# Patient Record
Sex: MALE | Race: WHITE | NOT HISPANIC OR LATINO | URBAN - METROPOLITAN AREA
[De-identification: names, ages, dates, MRNs, and addresses within clinical notes are randomized per-mention and may not be internally consistent; named-entity substitution may affect disease eponyms.]

---

## 2017-05-17 ENCOUNTER — GENERIC CONVERSION - ENCOUNTER (OUTPATIENT)
Dept: OTHER | Facility: OTHER | Age: 67
End: 2017-05-17

## 2017-06-05 ENCOUNTER — ALLSCRIPTS OFFICE VISIT (OUTPATIENT)
Dept: OTHER | Facility: OTHER | Age: 67
End: 2017-06-05

## 2017-07-06 ENCOUNTER — GENERIC CONVERSION - ENCOUNTER (OUTPATIENT)
Dept: OTHER | Facility: OTHER | Age: 67
End: 2017-07-06

## 2017-12-04 ENCOUNTER — GENERIC CONVERSION - ENCOUNTER (OUTPATIENT)
Dept: OTHER | Facility: OTHER | Age: 67
End: 2017-12-04

## 2018-01-12 NOTE — MISCELLANEOUS
Chief Complaint  Chief Complaint Free Text Note Form: No PAUL was made for this patient because he was discharged to a skilled nursing facility  Active Problems    1  Benign colon polyp (211 3) (K63 5)   2  Benign essential hypertension (401 1) (I10)   3  Bilateral arm weakness (729 89) (R29 898)   4  Chronic left hip pain (697 37,822 38) (M25 552,G89 29)   5  De Quervain's tenosynovitis (727 04) (M65 4)   6  Diabetes (250 00) (E11 9)   7  Disc degeneration, lumbar (722 52) (M51 36)   8  Dyslipidemia (272 4) (E78 5)   9  Encounter for screening for malignant neoplasm of colon (V76 51) (Z12 11)   10  Herniated lumbar intervertebral disc (722 10) (M51 26)   11  Hypokalemia (276 8) (E87 6)   12  Left ventricular hypertrophy (429 3) (I51 7)   13  Limb swelling (729 81) (M79 89)   14  Long term use of drug (V58 69) (Z79 899)   15  Lower back pain (724 2) (M54 5)   16  Lumbar canal stenosis (724 02) (M48 06)   17  Lumbar radiculopathy (724 4) (M54 16)   18  Morbid obesity (278 01) (E66 01)   19  Need for pneumococcal vaccine (V03 82) (Z23)   20  Need for prophylactic vaccination against single diseases (V05 9) (Z23)   21  Need for prophylactic vaccination and inoculation against influenza (V04 81) (Z23)   22  Obstructive sleep apnea (327 23) (G47 33)   23  Screening for genitourinary condition (V81 6) (Z13 89)   24  Special screening examination for neoplasm of prostate (V76 44) (Z12 5)   25  Spondylosis of lumbar region without myelopathy or radiculopathy (721 3) (M47 816)    Past Medical History    1  History of Benign paroxysmal vertigo, unspecified laterality (386 11) (H81 10)   2  History of hyperlipidemia (V12 29) (Z86 39)   3  Need for prophylactic vaccination and inoculation against influenza (V04 81) (Z23)    Surgical History    1  History of Spinal Diskectomy Lumbar    Family History  Mother    1  Family history of Diabetes Mellitus (V18 0)   2  Family history of Mother  At Age ___   3   Family history of Pulmonary Embolism  Father    4  Family history of Father  At Age ___   11  Family history of Mother  At Age 70  Sister    10  Family history of Breast Cancer (V16 3)   7  Family history of Diabetes Mellitus (V18 0)   8  Family history of Sister  At Age ___  Brother    5  Family history of CVA (cerebral vascular accident)  Family History    10  Family history of Hypertension (V17 49)    Social History    · Consumes alcohol occasionally (V49 89) (Z78 9)   · Denied: History of Drug Use   · Marital History - Currently    · Never smoked tobacco ( 89) (Z78 9)   · Occupation: Retired   · One child   · Previous  service, honorably discharged    Current Meds   1  Centrum Silver Adult 50+ Oral Tablet; TAKE 1 TABLET ORALLY DAILY (SUPPLEMENT); Therapy: (Recorded:2014) to Recorded   2  Gabapentin 300 MG Oral Capsule; TAKE 1 CAPSULE IN THE AM 1 CAPSULE IN THE   AFTERNOON, AND 2 CAPULES AT NIGHT; Therapy: 55Eiu5446 to (Evaluate:2017)  Requested for: 71Mvi6325; Last   Rx:2016 Ordered   3  Klor-Con M10 10 MEQ Oral Tablet Extended Release; Take 1 tablet once daily; Therapy: 12Hrm2601 to (Evaluate:2018)  Requested for: 51ZJB3062; Last   Rx:2017 Ordered   4  Nifedical XL 30 MG TB24; TAKE 1 TABLET DAILY; Therapy: 44FDO6676 to (Evaluate:2017)  Requested for: 67Hpz7460; Last   Rx:2016 Ordered   5  Potassium Chloride ER 10 MEQ Oral Tablet Extended Release; TAKE 1 TABLET ONCE   DAILY; Therapy: 16DKP0448 to (Evaluate:2017)  Requested for: 22VOA7079; Last   Rx:2016 Ordered   6  TraMADol HCl - 50 MG Oral Tablet; TAKE 1 TABLET  3 TIMES DAILY AS NEEDED FOR   PAIN;   Therapy: 07UIC0922 to (Evaluate:60Fqe5834)  Requested for: 2016; Last   Rx:2016 Ordered   7  Triamterene-HCTZ 37 5-25 MG Oral Capsule; take 1 capsule daily;    Therapy: 66MHU5083 to (Evaluate:94Iex7836)  Requested for: 99CAO5413; Last   Rx:2017 Ordered    Allergies    1  No Known Drug Allergies    2  No Known Environmental Allergies   3  No Known Food Allergies    Future Appointments    Date/Time Provider Specialty Site   12/04/2017 01:00 PM ALMA Brandt   [facility]     Signatures   Electronically signed by : Gaurang Stratton, ; Jul 6 2017  3:12PM EST                       (Author)    Electronically signed by : ALMA Cárdenas ; Jul 10 2017 11:48PM EST                       (Review)

## 2018-01-13 NOTE — RESULT NOTES
Verified Results  (Q) HEMOGLOBIN A1C WITH MPG [id number] 10:33AM Bonifacio Walsh   REPORT COMMENT:  FASTING:YES     Test Name Result Flag Reference   HEMOGLOBIN A1c 6 2 % of total Hgb H <5 7   For someone without known diabetes, a hemoglobin   A1c value between 5 7% and 6 4% is consistent with  prediabetes and should be confirmed with a   follow-up test      For someone with known diabetes, a value <7%  indicates that their diabetes is well controlled  A1c  targets should be individualized based on duration of  diabetes, age, comorbid conditions, and other  considerations  This assay result is consistent with an increased risk  of diabetes  Currently, no consensus exists regarding use of  hemoglobin A1c for diagnosis of diabetes for children  MEAN PLASMA GLUCOSE 143 mg/dL (calc)       (Q) LIPID PANEL WITH REFLEX TO DIRECT LDL [id number] 10:33AM [facility]     Test Name Result Flag Reference   CHOLESTEROL, TOTAL 166 mg/dL  125-200   HDL CHOLESTEROL 43 mg/dL  > OR = 40   TRIGLICERIDES 564 mg/dL  <150   LDL-CHOLESTEROL 96 mg/dL (calc)  <130   Desirable range <100 mg/dL for patients with CHD or  diabetes and <70 mg/dL for diabetic patients with  known heart disease  CHOL/HDLC RATIO 3 9 (calc)  < OR = 5 0   NON HDL CHOLESTEROL 123 mg/dL (calc)     Target for non-HDL cholesterol is 30 mg/dL higher than   LDL cholesterol target  (Q) COMPREHENSIVE METABOLIC PNL W/ADJUSTED CALCIUM [id number] 10:33AM Bonifacio Walsh     Test Name Result Flag Reference   GLUCOSE 134 mg/dL H 65-99   Fasting reference interval     For someone without known diabetes, a glucose  value >125 mg/dL indicates that they may have  diabetes and this should be confirmed with a  follow-up test    UREA NITROGEN (BUN) 21 mg/dL  7-25   CREATININE 0 95 mg/dL  0 70-1 25   For patients >52years of age, the reference limit  for Creatinine is approximately 13% higher for people  identified as -American  eGFR NON-AFR   AMERICAN 82 mL/min/1 73m2  > OR = 60   eGFR AFRICAN AMERICAN 96 mL/min/1 73m2  > OR = 60   BUN/CREATININE RATIO   1-41   NOT APPLICABLE (calc)   SODIUM 141 mmol/L  135-146   POTASSIUM 3 8 mmol/L  3 5-5 3   CHLORIDE 101 mmol/L     CARBON DIOXIDE 35 mmol/L H 20-31   CALCIUM 9 0 mg/dL  8 6-10 3   CALCIUM (ADJUSTED FOR$ALBUMIN) 9 4 mg/dL (calc)  8 6-10 2   PROTEIN, TOTAL 6 8 g/dL  6 1-8 1   ALBUMIN 3 9 g/dL  3 6-5 1   GLOBULIN 2 9 g/dL (calc)  1 9-3 7   ALBUMIN/GLOBULIN RATIO 1 3 (calc)  1 0-2 5   BILIRUBIN, TOTAL 0 6 mg/dL  0 2-1 2   ALKALINE PHOSPHATASE 82 U/L     AST 21 U/L  10-35   ALT 18 U/L  9-46     (Q) MICROALBUMIN, RANDOM URINE (W/CREATININE) [id number] 10:33AM [facility]     Test Name Result Flag Reference   CREATININE, RANDOM URINE 262 mg/dL     MICROALBUMIN 3 3 mg/dL     Reference Range  Not established   MICROALBUMIN/CREATININE$RATIO, RANDOM URINE 13 mcg/mg creat  <30   The ADA defines abnormalities in albumin  excretion as follows:     Category         Result (mcg/mg creatinine)     Normal                    <30  Microalbuminuria            Clinical albuminuria   > OR = 300     The ADA recommends that at least two of three  specimens collected within a 3-6 month period be  abnormal before considering a patient to be  within a diagnostic category       (Q) CBC (INCLUDES DIFF/PLT) (REFL) [id number] 10:33AM Bonifacio [facility] [facility]     Test Name Result Flag Reference   WHITE BLOOD CELL COUNT 6 6 Thousand/uL  3 8-10 8   RED BLOOD CELL COUNT 4 86 Million/uL  4 20-5 80   HEMOGLOBIN 14 0 g/dL  13 2-17 1   HEMATOCRIT 43 4 %  38 5-50 0   MCV 89 2 fL  80 0-100 0   MCH 28 7 pg  27 0-33 0   MCHC 32 2 g/dL  32 0-36 0   RDW 14 2 %  11 0-15 0   PLATELET COUNT 275 Thousand/uL  140-400   MPV 8 8 fL  7 5-12 5   ABSOLUTE NEUTROPHILS 3927 cells/uL  0748-9157   ABSOLUTE LYMPHOCYTES 1940 cells/uL  850-3900   ABSOLUTE MONOCYTES 515 cells/uL  200-950   ABSOLUTE EOSINOPHILS 185 cells/uL     ABSOLUTE BASOPHILS 33 cells/uL  0-200   NEUTROPHILS 59 5 % LYMPHOCYTES 29 4 %     MONOCYTES 7 8 %     EOSINOPHILS 2 8 %     BASOPHILS 0 5 %         Discussion/Summary   Mr Staci Robins,    Your blood tests look good       Signatures   Electronically signed by : ALMA Rankin ; May 17 2017  5:26PM EST                       (Author)

## 2018-01-15 VITALS
HEART RATE: 72 BPM | DIASTOLIC BLOOD PRESSURE: 82 MMHG | BODY MASS INDEX: 46.6 KG/M2 | HEIGHT: 68 IN | WEIGHT: 307.5 LBS | OXYGEN SATURATION: 96 % | SYSTOLIC BLOOD PRESSURE: 136 MMHG

## 2018-01-15 NOTE — RESULT NOTES
Message   #1  Please call the patient with the results of his laboratory testing  #2  His laboratory test results well, except that his blood sugar is high  #3  I recommend that he continue with his current medications, until his next office visit  #4  You may leave a message, if his communication consent allows for it  Verified Results  (Q) COMPREHENSIVE METABOLIC PNL W/ADJUSTED CALCIUM [id number] 12:01PM [facility]     Test Name Result Flag Reference   GLUCOSE 126 mg/dL H 65-99   Fasting reference interval   UREA NITROGEN (BUN) 21 mg/dL  7-25   CREATININE 1 01 mg/dL  0 70-1 25   For patients >52years of age, the reference limit  for Creatinine is approximately 13% higher for people  identified as -American  eGFR NON-AFR  AMERICAN 77 mL/min/1 73m2  > OR = 60   eGFR AFRICAN AMERICAN 89 mL/min/1 73m2  > OR = 60   BUN/CREATININE RATIO   8-98   NOT APPLICABLE (calc)   AST 23 U/L  10-35   ALT 23 U/L  9-46   PROTEIN, TOTAL 6 9 g/dL  6 1-8 1   ALBUMIN 4 0 g/dL  3 6-5 1   GLOBULIN 2 9 g/dL (calc)  1 9-3 7   ALBUMIN/GLOBULIN RATIO 1 4 (calc)  1 0-2 5   BILIRUBIN, TOTAL 0 5 mg/dL  0 2-1 2   ALKALINE PHOSPHATASE 70 U/L     SODIUM 141 mmol/L  135-146   POTASSIUM 3 6 mmol/L  3 5-5 3   CHLORIDE 100 mmol/L     CARBON DIOXIDE 30 mmol/L  19-30   CALCIUM 9 0 mg/dL  8 6-10 3   CALCIUM (ADJUSTED FOR$ALBUMIN) 9 3 mg/dL (calc)  8 6-10 2     (Q) HEPATITIS C ANTIBODY [id number] 12:01PM [facility]     Test Name Result Flag Reference   HEPATITIS C ANTIBODY NON-REACTIVE  NON-REACTIVE   SIGNAL TO CUT-OFF 0 05  <1 00     (Q) HEMOGLOBIN A1C WITH MPG [id number] 12:01PM [facility]     Test Name Result Flag Reference   HEMOGLOBIN A1c 6 6 % of total Hgb H <5 7   According to ADA guidelines, hemoglobin A1c <7 0%  represents optimal control in non-pregnant diabetic  patients  Different metrics may apply to specific  patient populations  Standards of Medical Care in  [phone]   Diabetes Care  2013;36:s11-s66     For the purpose of screening for the presence of  diabetes  <5 7%       Consistent with the absence of diabetes  5 7-6 4%    Consistent with increased risk for diabetes              (prediabetes)  >or=6 5%    Consistent with diabetes     This assay result is consistent with diabetes  mellitus  Currently, no consensus exists for use of hemoglobin  A1c for diagnosis of diabetes for children     MEAN PLASMA GLUCOSE 158 mg/dL (calc)

## 2018-01-15 NOTE — RESULT NOTES
Message   #1  Please call the patient with the results of his laboratory testing  #2  His blood sugar average is high, but has improved slightly  #3  Otherwise, his laboratory test results look well  #4  I recommend that he continue with his current medications, until his office visit later this month with Dr Shaheen Brewer  #5  You may leave a message, if his communication consent allows for it  Verified Results  (Q) CBC (INCLUDES DIFF/PLT) (REFL) [id number] 09:59AM Adela [facility]ag[facility]     Test Name Result Flag Reference   WHITE BLOOD CELL COUNT 6 9 Thousand/uL  3 8-10 8   RED BLOOD CELL COUNT 4 80 Million/uL  4 20-5 80   HEMOGLOBIN 13 8 g/dL  13 2-17 1   HEMATOCRIT 42 4 %  38 5-50 0   MCV 88 4 fL  80 0-100 0   MCH 28 8 pg  27 0-33 0   MCHC 32 5 g/dL  32 0-36 0   RDW 13 8 %  11 0-15 0   PLATELET COUNT 887 Thousand/uL  140-400   MPV 8 7 fL  7 5-11 5   ABSOLUTE NEUTROPHILS 4326 cells/uL  4438-4754   ABSOLUTE LYMPHOCYTES 1884 cells/uL  850-3900   ABSOLUTE MONOCYTES 518 cells/uL  200-950   ABSOLUTE EOSINOPHILS 145 cells/uL     ABSOLUTE BASOPHILS 28 cells/uL  0-200   NEUTROPHILS 62 7 %     LYMPHOCYTES 27 3 %     MONOCYTES 7 5 %     EOSINOPHILS 2 1 %     BASOPHILS 0 4 %       (Q) COMPREHENSIVE METABOLIC PNL W/ADJUSTED CALCIUM [id number] 09:59AM Adela Fragmin     Test Name Result Flag Reference   GLUCOSE 130 mg/dL H 65-99   Fasting reference interval   UREA NITROGEN (BUN) 18 mg/dL  7-25   CREATININE 1 03 mg/dL  0 70-1 25   For patients >52years of age, the reference limit  for Creatinine is approximately 13% higher for people  identified as -American  eGFR NON-AFR   AMERICAN 75 mL/min/1 73m2  > OR = 60   eGFR AFRICAN AMERICAN 87 mL/min/1 73m2  > OR = 60   BUN/CREATININE RATIO   8-71   NOT APPLICABLE (calc)   SODIUM 143 mmol/L  135-146   POTASSIUM 4 0 mmol/L  3 5-5 3   CHLORIDE 99 mmol/L     CARBON DIOXIDE 35 mmol/L H 20-31   CALCIUM 9 3 mg/dL  8 6-10 3   CALCIUM (ADJUSTED FOR$ALBUMIN) 9 7 mg/dL (calc)  8 6-10 2   PROTEIN, TOTAL 7 0 g/dL  6 1-8 1   ALBUMIN 3 9 g/dL  3 6-5 1   GLOBULIN 3 1 g/dL (calc)  1 9-3 7   ALBUMIN/GLOBULIN RATIO 1 3 (calc)  1 0-2 5   BILIRUBIN, TOTAL 0 7 mg/dL  0 2-1 2   ALKALINE PHOSPHATASE 78 U/L     AST 23 U/L  10-35   ALT 24 U/L  9-46     (Q) HEMOGLOBIN A1C WITH MPG [id number] 09:59AM [facility]   REPORT COMMENT:  FASTING:YES     Test Name Result Flag Reference   HEMOGLOBIN A1c 6 4 % of total Hgb H <5 7   According to ADA guidelines, hemoglobin A1c <7 0%  represents optimal control in non-pregnant diabetic  patients  Different metrics may apply to specific  patient populations  Standards of Medical Care in    Diabetes Care  2013;36:s11-s66     For the purpose of screening for the presence of  diabetes  <5 7%       Consistent with the absence of diabetes  5 7-6 4%    Consistent with increased risk for diabetes              (prediabetes)  >or=6 5%    Consistent with diabetes     This assay result is consistent with a higher risk  of diabetes  Currently, no consensus exists for use of hemoglobin  A1c for diagnosis of diabetes for children  MEAN PLASMA GLUCOSE 151 mg/dL (calc)       (Q) LIPID PANEL WITH REFLEX TO DIRECT LDL [id number] 09:59AM [facility]     Test Name Result Flag Reference   CHOLESTEROL, TOTAL 159 mg/dL  125-200   HDL CHOLESTEROL 43 mg/dL  > OR = 40   TRIGLICERIDES 988 mg/dL  <150   LDL-CHOLESTEROL 89 mg/dL (calc)  <130   Desirable range <100 mg/dL for patients with CHD or  diabetes and <70 mg/dL for diabetic patients with  known heart disease  CHOL/HDLC RATIO 3 7 (calc)  < OR = 5 0   NON HDL CHOLESTEROL 116 mg/dL (calc)     Target for non-HDL cholesterol is 30 mg/dL higher than   LDL cholesterol target       (Q) MICROALBUMIN, RANDOM URINE (W/CREATININE) [id number] 09:59AM [facility]     Test Name Result Flag Reference   CREATININE, RANDOM URINE 129 mg/dL     MICROALBUMIN 1 7 mg/dL     Reference Range  Not established MICROALBUMIN/CREATININE$RATIO, RANDOM URINE 13 mcg/mg creat  <30   The ADA defines abnormalities in albumin  excretion as follows:     Category         Result (mcg/mg creatinine)     Normal                    <30  Microalbuminuria            Clinical albuminuria   > OR = 300     The ADA recommends that at least two of three  specimens collected within a 3-6 month period be  abnormal before considering a patient to be  within a diagnostic category  (Q) TSH, 3RD GENERATION W/REFLEX TO FT4 [id number] 09:59AM [facility]von[facility]     Test Name Result Flag Reference   TSH W/REFLEX TO FT4 2 09 mIU/L  0 40-4 50     (Q) PSA, TOTAL WITH REFLEX TO PSA, FREE [id number] 09:59AM [facility]     Test Name Result Flag Reference   TOTAL PSA 0 4 ng/mL  < OR = 4 0   This test was performed using the [place]  immunoassay method  Values obtained with other assay  methods cannot be used interchangeably  PSA levels,  regardless of value, should not be interpreted as  absolute evidence of the presence or absence of disease

## 2018-01-23 NOTE — MISCELLANEOUS
Provider Comments  Provider Comments:   Pt was a NOS  LMOM to call back and make new appointment       PATIENT IN NURSING HOME AND WAS NOT SUPPOSED TO BE MARKED NO SHOW-DR REYES      Signatures   Electronically signed by : ALMA Sanchez ; Dec 13 2017 12:24AM EST                       (Author)

## 2019-06-11 ENCOUNTER — NURSING HOME VISIT (OUTPATIENT)
Dept: OTHER | Facility: HOSPITAL | Age: 69
End: 2019-06-11
Payer: MEDICARE

## 2019-06-11 DIAGNOSIS — K02.9 DENTAL CARIES: Primary | ICD-10-CM

## 2019-06-11 PROCEDURE — 99307 SBSQ NF CARE SF MDM 10: CPT | Performed by: NURSE PRACTITIONER

## 2019-06-18 ENCOUNTER — NURSING HOME VISIT (OUTPATIENT)
Dept: GERIATRICS | Facility: OTHER | Age: 69
End: 2019-06-18
Payer: MEDICARE

## 2019-06-18 DIAGNOSIS — R10.13 DYSPEPSIA: ICD-10-CM

## 2019-06-18 DIAGNOSIS — R53.81 DEBILITY: Primary | ICD-10-CM

## 2019-06-18 DIAGNOSIS — E11.9 TYPE 2 DIABETES MELLITUS WITHOUT COMPLICATION, WITHOUT LONG-TERM CURRENT USE OF INSULIN (HCC): ICD-10-CM

## 2019-06-18 DIAGNOSIS — I10 ESSENTIAL HYPERTENSION: ICD-10-CM

## 2019-06-18 DIAGNOSIS — R60.0 LOWER EXTREMITY EDEMA: ICD-10-CM

## 2019-06-18 DIAGNOSIS — K02.9 DENTAL CARIES: ICD-10-CM

## 2019-06-18 DIAGNOSIS — I63.9 CEREBROVASCULAR ACCIDENT (CVA), UNSPECIFIED MECHANISM (HCC): ICD-10-CM

## 2019-06-18 PROCEDURE — 99309 SBSQ NF CARE MODERATE MDM 30: CPT | Performed by: NURSE PRACTITIONER

## 2019-08-07 ENCOUNTER — NURSING HOME VISIT (OUTPATIENT)
Dept: GERIATRICS | Facility: OTHER | Age: 69
End: 2019-08-07
Payer: MEDICARE

## 2019-08-07 DIAGNOSIS — K59.00 CONSTIPATION, UNSPECIFIED CONSTIPATION TYPE: ICD-10-CM

## 2019-08-07 DIAGNOSIS — I69.354 HEMIPARESIS AFFECTING LEFT SIDE AS LATE EFFECT OF CEREBROVASCULAR ACCIDENT (HCC): ICD-10-CM

## 2019-08-07 DIAGNOSIS — G47.33 OBSTRUCTIVE SLEEP APNEA: ICD-10-CM

## 2019-08-07 DIAGNOSIS — D63.8 ANEMIA OF CHRONIC DISEASE: ICD-10-CM

## 2019-08-07 DIAGNOSIS — I63.9 CEREBROVASCULAR ACCIDENT (CVA), UNSPECIFIED MECHANISM (HCC): ICD-10-CM

## 2019-08-07 DIAGNOSIS — R53.81 DEBILITY: Primary | ICD-10-CM

## 2019-08-07 DIAGNOSIS — I10 ESSENTIAL HYPERTENSION: ICD-10-CM

## 2019-08-07 DIAGNOSIS — E11.8 TYPE 2 DIABETES MELLITUS WITH COMPLICATION, WITHOUT LONG-TERM CURRENT USE OF INSULIN (HCC): ICD-10-CM

## 2019-08-07 DIAGNOSIS — R60.0 LOWER EXTREMITY EDEMA: ICD-10-CM

## 2019-08-07 PROBLEM — K21.9 GERD (GASTROESOPHAGEAL REFLUX DISEASE): Status: ACTIVE | Noted: 2019-08-07

## 2019-08-07 PROBLEM — E11.9 TYPE 2 DIABETES MELLITUS, WITHOUT LONG-TERM CURRENT USE OF INSULIN (HCC): Status: ACTIVE | Noted: 2019-08-07

## 2019-08-07 PROBLEM — E78.5 HYPERLIPIDEMIA: Status: ACTIVE | Noted: 2019-08-07

## 2019-08-07 PROCEDURE — 99309 SBSQ NF CARE MODERATE MDM 30: CPT | Performed by: INTERNAL MEDICINE

## 2019-08-07 NOTE — ASSESSMENT & PLAN NOTE
Lab Results   Component Value Date    HGBA1C 6 2 (H) 05/16/2017       No results for input(s): POCGLU in the last 72 hours      Blood Sugar Average: Last 72 hrs:

## 2019-08-07 NOTE — PROGRESS NOTES
[address]  [address]  Facility: [facility]  Moira Andrew/        NAME: Precious Reyes  AGE: 70 y o  SEX: male    DATE OF ENCOUNTER: 8/7/2019    Code status:  CPR    Assessment and Plan     Problem List Items Addressed This Visit        Endocrine    Type 2 diabetes mellitus, without long-term current use of insulin ([facility] [address] )     Lab Results   Component Value Date    HGBA1C 6 2 (H) 05/16/2017       No results for input(s): POCGLU in the last 72 hours  Blood Sugar Average: Last 72 hrs:              Respiratory    Obstructive sleep apnea       Cardiovascular and Mediastinum    CVA (cerebral vascular accident) ([facility] [address] )    Essential hypertension       Nervous and Auditory    Hemiparesis affecting left side as late effect of cerebrovascular accident ([facility] [address] )       Other    Anemia of chronic disease    Constipation    Debility - Primary    Lower extremity edema      1  Debility secondary to his chronic medical conditions-he still requires 24/7 care/support of his ADLs  Continue with care/support of his ADLs at long-term care facility level of care  Continue monitoring  2  Hypertension-review of his BP log shows that he is doing well with amlodipine, hydralazine, labetalol, and lisinopril  Continue with clinical and periodic laboratory monitoring  3  CVA with left hemiparesis secondary to intracranial hemorrhage-he is doing well with atorvastatin and blood pressure control  No aspirin secondary to significant atraumatic intracranial hemorrhage  He is to follow up with his neurologist in December 2019  Continue monitoring  4  Lower extremity edema-doing well with furosemide  Continue with clinical and periodic laboratory monitoring  5  Type 2 diabetes mellitus-review of his fasting fingerstick blood sugar log shows that he is doing well with TLC  Continue with clinical and periodic laboratory monitoring      6  Anemia of chronic disease-asymptomatic  Continue with clinical and periodic laboratory monitoring  7  Constipation-he reports doing well with MiraLax  Continue with monitoring  8  Obstructive sleep apnea-he reports feeling well with nightly usage of his CPAP machine  Continue with monitoring  9  Hyperlipidemia-doing well with atorvastatin  Continue with monitoring  10  GERD-continue with Pepcid  All medications and routine orders were reviewed and updated as needed  Plan discussed with: Nurse    Chief Complaint     He is seen with nursing staff for a follow-up visit to update the care and treatment of his debility secondary to his chronic medical conditions, hypertension, CVA with left hemiparesis, type 2 diabetes mellitus, and anemia of chronic disease  History of Present Illness     He is a 69-year-old gentleman seen with nursing for a follow-up visit to update the care and treatment of his debility secondary to his chronic medical conditions-still requiring 24/7 care/support of his ADLs, hypertension-doing well with amlodipine/hydralazine/labetalol/lisinopril, CVA with left hemiparesis-doing well with amlodipine and atorvastatin, type 2 diabetes mellitus-doing well with TLC, and anemia of chronic disease-asymptomatic  The following portions of the patient's history were reviewed and updated as appropriate: current medications, past family history, past medical history, past social history, past surgical history and problem list     Allergies: Allergies not on file    Review of Systems     Review of Systems   Constitutional: Negative for appetite change  When asked, he reports feeling well and has no complaints today  Respiratory: Negative for shortness of breath  Cardiovascular: Negative for chest pain  Gastrointestinal: Negative for abdominal pain and constipation  Medications and orders     All medications reviewed and updated in Nursing Home EMR        Objective Vitals:  Weight 302 9 lb (stable for the last 3 months)  3  Vitals:  Pulse 72, respiratory 12, blood pressure 132/68, fasting fingerstick blood sugar 122  Physical Exam   Constitutional: Vital signs are normal  He appears well-developed and well-nourished  He is cooperative  Non-toxic appearance  No distress  He appears comfortable lying in bed, his stated age, and chronically ill  Eyes: No scleral icterus  Cardiovascular: Normal rate, regular rhythm and normal heart sounds  Exam reveals no gallop and no friction rub  No murmur heard  There is a mild amount of bilateral ankle and pedal edema  Pulmonary/Chest: Effort normal and breath sounds normal  No stridor  No respiratory distress  He has no wheezes  He has no rales  Abdominal: Soft  He exhibits no distension and no mass  There is no tenderness  There is no guarding  Musculoskeletal: He exhibits edema (There is a mild amount of bilateral ankle/pedal edema  There is no edema in his upper extremities  )  Neurological: He is alert  Psychiatric: He has a normal mood and affect  His behavior is normal  Judgment and thought content normal        Pertinent Laboratory/Diagnostic Studies: The following studies were reviewed please see chart or hospital paperwork for details  I reviewed his neurologist's consult note from December 2018  - Treatment plan reviewed with nursing      Melvin Eisenmenger, M D   8/7/2019 6:35 PM

## 2019-09-27 ENCOUNTER — NURSING HOME VISIT (OUTPATIENT)
Dept: GERIATRICS | Facility: OTHER | Age: 69
End: 2019-09-27
Payer: MEDICARE

## 2019-09-27 DIAGNOSIS — I69.354 HEMIPARESIS AFFECTING LEFT SIDE AS LATE EFFECT OF CEREBROVASCULAR ACCIDENT (HCC): ICD-10-CM

## 2019-09-27 DIAGNOSIS — I10 ESSENTIAL HYPERTENSION: ICD-10-CM

## 2019-09-27 DIAGNOSIS — K21.9 GASTROESOPHAGEAL REFLUX DISEASE, ESOPHAGITIS PRESENCE NOT SPECIFIED: ICD-10-CM

## 2019-09-27 DIAGNOSIS — K59.00 CONSTIPATION, UNSPECIFIED CONSTIPATION TYPE: ICD-10-CM

## 2019-09-27 DIAGNOSIS — E11.8 TYPE 2 DIABETES MELLITUS WITH COMPLICATION, WITHOUT LONG-TERM CURRENT USE OF INSULIN (HCC): ICD-10-CM

## 2019-09-27 DIAGNOSIS — R60.0 LOWER EXTREMITY EDEMA: ICD-10-CM

## 2019-09-27 DIAGNOSIS — R53.81 DEBILITY: Primary | ICD-10-CM

## 2019-09-27 DIAGNOSIS — G47.33 OBSTRUCTIVE SLEEP APNEA: ICD-10-CM

## 2019-09-27 PROCEDURE — 99309 SBSQ NF CARE MODERATE MDM 30: CPT | Performed by: NURSE PRACTITIONER

## 2019-09-27 NOTE — ASSESSMENT & PLAN NOTE
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

## 2019-09-27 NOTE — ASSESSMENT & PLAN NOTE
-  Denies symptoms on today's visit  - continue medication management with famotidine 20 mg at HS and diet modification  - encourage sitting upright for 30-60 minutes after meals

## 2019-09-27 NOTE — ASSESSMENT & PLAN NOTE
- steady weight gain accompanied by + 3 lower extremity edema bilaterally   - denies shortness of breath, chest pain or palpitations; only c/o of his edematous legs aching   - currently scheduled 20 mg lasix daily with potassium 20 meq BID   -will d/c previous lasix and KDUR order and start lasix 40 mg daily and KDUR 40 meq in AM and 20 in the PM  - check BMP on 10/3  - continue TEDs   -discontinued "encourage PO fluids" order   -changed weight monitoring from weekly to 3x/week  - if edema and weight gain persists, t/c dietary consult (already on reduced calorie diet), decreasing Norvasc and or gabapentin which may be contributing to s/sx   - continue to monitor for improvement and or worsening in condition   -DWN

## 2019-09-27 NOTE — ASSESSMENT & PLAN NOTE
- BP logs reviewed and stable  - continue medication management with amlodipine 10 mg daily, hydralazine 50 mg 4 times a day, labetalol 100 mg every 12 hours, and lisinopril 40 mg daily

## 2019-09-27 NOTE — PROGRESS NOTES
[facility]  [address]  [phone]  [address]  Code 32      NAME: Sophie Anderson  AGE: 78 y o   SEX: male    : 1950    Code Status: CPR    DATE OF ENCOUNTER: 2019    Assessment and Plan     Problem List Items Addressed This Visit        Digestive    GERD (gastroesophageal reflux disease)     -  Denies symptoms on today's visit  - continue medication management with famotidine 20 mg at HS and diet modification  - encourage sitting upright for 30-60 minutes after meals            Endocrine    Type 2 diabetes mellitus, without long-term current use of insulin ([facility] [address] )     - doing well without medication management  - reviewed fasting blood glucose logs, stable  - last hemoglobin A1c 5 8% on 2019  - continue lifestyle and diet modifications            Respiratory    Obstructive sleep apnea     - stable  - compliant with CPAP machine while sleeping  - continue monitoring            Cardiovascular and Mediastinum    Essential hypertension     - BP logs reviewed and stable  - continue medication management with amlodipine 10 mg daily, hydralazine 50 mg 4 times a day, labetalol 100 mg every 12 hours, and lisinopril 40 mg daily            Nervous and Auditory    Hemiparesis affecting left side as late effect of cerebrovascular accident ([facility] [address] )     - right basal ganglia CVA on 2017; residual left hemiparesis  - primary condition causing his debility  - seen by Neurology 2018  - neurology recommending blood pressure goal of less than 140/80 and LDL target of less than 70  - continue blood pressure management, diabetes control, and statin therapy with Lipitor 40 mg daily  - encourage activity as tolerated  - continue to provide supportive environment            Other    Debility - Primary     - secondary to his chronic medical conditions  - continue / support at [facility]         Lower extremity edema     - steady weight gain accompanied by + 3 lower extremity edema bilaterally   - denies shortness of breath, chest pain or palpitations; only c/o of his edematous legs aching   - currently scheduled 20 mg lasix daily with potassium 20 meq BID   -will d/c previous lasix and KDUR order and start lasix 40 mg daily and KDUR 40 meq in AM and 20 in the PM  - check BMP on 10/3  - continue TEDs   -discontinued "encourage PO fluids" order   -changed weight monitoring from weekly to 3x/week  - if edema and weight gain persists, t/c dietary consult (already on reduced calorie diet), decreasing Norvasc and or gabapentin which may be contributing to s/sx   - continue to monitor for improvement and or worsening in condition   -DWN         Constipation     - denies complaints on today's visit  - continue MiraLax every other day               No orders of the defined types were placed in this encounter  Chief Complaint     Debility and follow-up of chronic medical conditions  History of Present Illness      69-year-old male, resident of [facility], seen in collaboration with nursing, to evaluate debility secondary to his chronic medical conditions including but not limited to hypertension, CVA, DM II, and lower extremity edema  No concerns from Nursing, except for steady increase in weight  Upon exam, resident sitting comfortably in his wheelchair without any s/sx of distress or discomfort  He denies decreased appetite, difficulty sleeping, shortness of breath, chest pain, palpitations, nausea, vomiting, constipation, or diarrhea  He reports moderate aching in his lower extremities secondary to edema  No other complaints during his visit  Resident anxiously awaiting Friday afternoon bingo with fellow residents        The following portions of the patient's history were reviewed and updated as appropriate: allergies, current medications, past family history ( no pertinent family history), past medical history and problem list     Review of Systems     Review of Systems   Constitutional: Positive for activity change (secondary to chronic conditions)  Negative for appetite change, chills and diaphoresis  HENT: Negative  Respiratory: Negative for cough, choking, chest tightness and shortness of breath  Cardiovascular: Positive for leg swelling  Negative for chest pain and palpitations  Gastrointestinal: Negative for abdominal distention, abdominal pain, constipation, diarrhea and nausea  Musculoskeletal:        Leg pain from edema      Skin: Negative  Neurological: Positive for weakness (left sided weakness from CVA)  Negative for dizziness, speech difficulty, light-headedness, numbness and headaches  Psychiatric/Behavioral: Negative for agitation, confusion and sleep disturbance  The patient is not nervous/anxious  Active Problem List     Patient Active Problem List   Diagnosis    Dental caries    Debility    Essential hypertension    [facility]) [email or web address]    Lower extremity edema    Dyspepsia    Hemiparesis affecting left side as late effect of cerebrovascular accident ([email or web address] )    Type 2 diabetes mellitus, without long-term current use of insulin ([facility])    Anemia of chronic disease    Obstructive sleep apnea    Hyperlipidemia    Constipation    GERD (gastroesophageal reflux disease)       Objective      /70, HR 72, respirations 18, 95% on room air, fasting blood glucose 129, temperature 98°, weight 313 5 lbs (302 lbs 8/7/19)    Physical Exam   Constitutional: No distress  Appearing chronically ill    Eyes: Conjunctivae are normal  Right eye exhibits no discharge  Left eye exhibits no discharge  Cardiovascular: Normal rate, regular rhythm, normal heart sounds and intact distal pulses  +3 CRISTOBAL bilaterally      Pulmonary/Chest: Effort normal and breath sounds normal  No respiratory distress  Abdominal: Soft  Bowel sounds are normal  He exhibits no distension   There is no tenderness  Obese      Musculoskeletal: He exhibits no tenderness or deformity  ROM moderately impaired of lower extremities    Neurological: He is alert  Left sided hemiparesis; oriented to name,  and place    Skin: Skin is warm and dry  Capillary refill takes less than 2 seconds  No rash noted  He is not diaphoretic  No erythema  Psychiatric: He has a normal mood and affect  Pleasant and cooperative during exam    Nursing note and vitals reviewed  Pertinent Laboratory/Diagnostic Studies:  Reviewed  Lipids 19  BMP 2019;  Creatinine 0 81, GFR 91, potassium 3 5, sodium 144  CBC 2019; hemoglobin 11 8, hematocrit 35 9  hemoglobin A1c 5 8% 2019     Consults   Podiatry 2019   Neurology 2018   Ophthalmology 2019    Current Medications       Medications reviewed and updated, see facility [facility] for details  No PRNs and in the past 30 days      KSENIA Cha   [facility]  2019 5:29 PM

## 2019-09-27 NOTE — ASSESSMENT & PLAN NOTE
- doing well without medication management  - reviewed fasting blood glucose logs, stable  - last hemoglobin A1c 5 8% on 08/14/2019  - continue lifestyle and diet modifications

## 2019-10-03 ENCOUNTER — NURSING HOME VISIT (OUTPATIENT)
Dept: GERIATRICS | Facility: OTHER | Age: 69
End: 2019-10-03
Payer: MEDICARE

## 2019-10-03 DIAGNOSIS — R60.0 LOWER EXTREMITY EDEMA: Primary | ICD-10-CM

## 2019-10-03 PROCEDURE — 99308 SBSQ NF CARE LOW MDM 20: CPT | Performed by: NURSE PRACTITIONER

## 2019-10-03 NOTE — PROGRESS NOTES
[facility]  [address]  [phone]  [address]  Code 32    NAME: Emma Roca  AGE: 78 y o  SEX: male    : 1950    Code Status: CPR    DATE OF ENCOUNTER: 10/4/2019    Assessment and Plan     Problem List Items Addressed This Visit        Other    Lower extremity edema - Primary     -improved since increasing his lasix from 20 to 40 mg daily  -resident reports that he legs feel " a lot better and less achy  "  -weight trending matty; 313 5 lbs on  and 303 8 lbs on 10/1; continue monitoring weight 3x/week  -reviewed BMP from 10/3; kidney function stable  -will increase KDUR from 40 meq in the AM and 20 meq in the PM to 40 meq BID (potassium level 3 5)  -repeat BMP in 3 weeks to evaluate appropriate dosing of potassium repletion   -continue to monitor for improvement and or worsening in condition   -DWN and resident                No orders of the defined types were placed in this encounter  Chief Complaint     Follow up on recent lasix adjustment, BMP and weights     History of Present Illness     79 yo pleasant male, resident of [address], seen in collaboration with nursing to follow up on recent increase in lasix as a result of his weight gain and worsening bilateral lower extremity edema  No concerns from nursing  Upon exam, resident sitting in his wheelchair without any s/sx of distress or discomfort  He reports that his legs feel much less "achy" in comparison to last week  He denies shortness of breath, chest pain or palpations  The following portions of the patient's history were reviewed and updated as appropriate: allergies, current medications, past medical history and problem list     Review of Systems     Review of Systems   Constitutional: Positive for activity change (secondary to his chronic medical conditions )  Negative for appetite change, chills, diaphoresis and fever     Respiratory: Negative for chest tightness and shortness of breath  Cardiovascular: Positive for leg swelling (chronic but improved)  Negative for chest pain and palpitations  Skin: Negative  Neurological: Negative for dizziness, speech difficulty, light-headedness and headaches  Active Problem List     Patient Active Problem List   Diagnosis    Dental caries    Debility    Essential hypertension    CVA (cerebral vascular accident) ([place] [address] )    Lower extremity edema    Dyspepsia    Hemiparesis affecting left side as late effect of cerebrovascular accident ([place] [address] )    Type 2 diabetes mellitus, without long-term current use of insulin ([place] [address] )    Anemia of chronic disease    Obstructive sleep apnea    Hyperlipidemia    Constipation    GERD (gastroesophageal reflux disease)       Objective     /68, HR 55, weight: 303 8 lbs (10/1)    Physical Exam   Constitutional: He is oriented to person, place, and time  No distress  Appearing chronically ill    Cardiovascular: Normal rate, regular rhythm, normal heart sounds and intact distal pulses  + 2 CRISTOBAL B/L (+3 last week); TEDs applied   Pulmonary/Chest: Effort normal and breath sounds normal  No respiratory distress  He has no wheezes  He has no rales  Neurological: He is alert and oriented to person, place, and time  Skin: Capillary refill takes less than 2 seconds  He is not diaphoretic  Psychiatric: He has a normal mood and affect  cooperative   Nursing note and vitals reviewed  Pertinent Laboratory/Diagnostic Studies:  Reviewed BMP 10/3/19; creatinine 0 82, GFR 90, sodium 141, potassium 3 5    Current Medications       Medications reviewed and updated, see facility [facility] for details         KSENIA Reed   [facility]  10/4/2019 7:23 AM

## 2019-10-04 NOTE — ASSESSMENT & PLAN NOTE
-improved since increasing his lasix from 20 to 40 mg daily  -resident reports that he legs feel " a lot better and less achy  "  -weight trending matty; 313 5 lbs on 9/27 and 303 8 lbs on 10/1; continue monitoring weight 3x/week  -reviewed BMP from 10/3; kidney function stable  -will increase KDUR from 40 meq in the AM and 20 meq in the PM to 40 meq BID (potassium level 3 5)  -repeat BMP in 3 weeks to evaluate appropriate dosing of potassium repletion   -continue to monitor for improvement and or worsening in condition   -DWN and resident

## 2019-11-20 ENCOUNTER — NURSING HOME VISIT (OUTPATIENT)
Dept: GERIATRICS | Facility: OTHER | Age: 69
End: 2019-11-20
Payer: MEDICARE

## 2019-11-20 DIAGNOSIS — I63.9 CEREBROVASCULAR ACCIDENT (CVA), UNSPECIFIED MECHANISM (HCC): Primary | ICD-10-CM

## 2019-11-20 DIAGNOSIS — G62.9 NEUROPATHY: ICD-10-CM

## 2019-11-20 DIAGNOSIS — E78.5 HYPERLIPIDEMIA, UNSPECIFIED HYPERLIPIDEMIA TYPE: ICD-10-CM

## 2019-11-20 DIAGNOSIS — E11.40 TYPE 2 DIABETES MELLITUS WITH DIABETIC NEUROPATHY, WITHOUT LONG-TERM CURRENT USE OF INSULIN (HCC): ICD-10-CM

## 2019-11-20 DIAGNOSIS — R53.81 DEBILITY: ICD-10-CM

## 2019-11-20 DIAGNOSIS — G47.33 OBSTRUCTIVE SLEEP APNEA: ICD-10-CM

## 2019-11-20 DIAGNOSIS — K59.00 CONSTIPATION, UNSPECIFIED CONSTIPATION TYPE: ICD-10-CM

## 2019-11-20 DIAGNOSIS — K21.9 GASTROESOPHAGEAL REFLUX DISEASE, ESOPHAGITIS PRESENCE NOT SPECIFIED: ICD-10-CM

## 2019-11-20 PROCEDURE — 99309 SBSQ NF CARE MODERATE MDM 30: CPT | Performed by: STUDENT IN AN ORGANIZED HEALTH CARE EDUCATION/TRAINING PROGRAM

## 2019-11-20 NOTE — PROGRESS NOTES
[address]  [phone]  [facility] Progress Note  Billing code:32        NAME: Eris Barfield  AGE: 78 y o  SEX: male    DATE OF ENCOUNTER:  11/20/19    Assessment and Plan     Problem List Items Addressed This Visit        Digestive    GERD (gastroesophageal reflux disease)     Symptoms remain stable on current regimen  Continue famotidine 20 mg p o   Daily  Continue anti reflux measures  Will continue to monitor            Endocrine    Type 2 diabetes mellitus, without long-term current use of insulin ([facility])       Previous HbA1c done 8/19 was 5 0 8  Accu-Cheks remain stable and controlled on diet only  Recommend adherence to a diabetic, heart healthy diet  Patient encouraged to participate in physical activity as a form of exercise for weight loss  Will continue to monitor            Respiratory    Obstructive sleep apnea     Patient continues to use his CPAP machine at nighttime  Follow up routinely with Sleep Medicine for continued monitoring and titration of pressures as needed            Cardiovascular and Mediastinum    CVA (cerebral vascular accident) ([facility] [address] ) - Primary     History of CVA in right basal ganglion in 2017  Patient denies any new focal neurological deficits  Manage risk factors for CVA:  HTN, HLD, diabetes, obesity  Continue hydralazine/amlodipine/labetalol/lisinopril, atorvastatin  Continue PT/OT as needed  Maintain [place] precautions  Continue routine follow-up with Neurology as scheduled            Nervous and Auditory    Neuropathy     Patient explains symptoms controlled on current regimen  Continue gabapentin 300 mg p o  Q 12h  Physical activity encouraged  Continue adherence to a diabetic, heart healthy diet  Last HbA1c 5 8 on 08/19  Accu-Cheks remain controlled  Will continue to monitor            Other    Opal Postin secondary to history of CVA, morbid obesity, chronic comorbidities  Patient continues to require 24/7 supervision and assistance with ADLs in order to function as per his baseline  Manage chronic conditions  Maintain [place] precautions  Continue nutritional support, supportive care  PT/OT/speech therapy as needed  Will continue to monitor         Hyperlipidemia     Continue atorvastatin 40 mg p o  Daily  Recommended adherence to a diabetic, heart healthy diet         Constipation     Continue MiraLax 17 g every other day  Encouraged increase fiber intake  Physical activity as tolerated  Nursing home bowel regimen as needed  Will continue to monitor                   - Counseling Documentation: patient was counseled regarding: diagnostic results, instructions for management, risk factor reductions, prognosis, patient and family education, impressions, risks and benefits of treatment options and importance of compliance with treatment    Chief Complaint     Patient seen for routine monthly long-term care nursing home rounds for follow-up of chronic conditions    History of Present Illness     HPI  Patient seen and examined for routine monthly long-term care nursing home rounds for follow-up of chronic conditions  Patient explains that he becomes frustrated sometimes that he is unable to ambulate as he did in the past given his gradual increase in weight as well as debility from his left-sided hemiparesis from his prior CVA  Of note patient has had multiple times of physical therapy however did not participate fully per nursing staff  Patient explains that he continues to have a healthy diet appetite, has been sleeping well and having regular bowel movements  The patient continues to do well on famotidine for a history of GERD  He has also been compliant with hydralazine, amlodipine, lisinopril and labetalol for a history of hypertension with blood pressures  remaining controlled  He also continues on MiraLax for a history of constipation with regular bowel movements on this regimen    No acute concerns expressed by nursing staff  The following portions of the patient's history were reviewed and updated as appropriate: allergies, current medications, past family history, past medical history, past social history, past surgical history and problem list     Review of Systems     Review of Systems   Constitutional: Positive for activity change (Chronically since CVA)  Negative for appetite change, chills, fatigue and fever  HENT: Negative for congestion, ear pain, hearing loss, rhinorrhea and sore throat  Eyes: Negative for discharge  Respiratory: Negative for cough, chest tightness, shortness of breath, wheezing and stridor  Cardiovascular: Positive for leg swelling (Chronic, minimal)  Negative for chest pain and palpitations  Gastrointestinal: Positive for constipation  Negative for abdominal pain, diarrhea, nausea and vomiting  Genitourinary: Negative for decreased urine volume, dysuria and hematuria  Musculoskeletal: Positive for arthralgias (Chronic) and gait problem (Uses wheelchair at baseline)  Negative for neck stiffness  Skin: Negative for color change, pallor, rash and wound  Neurological: Negative for dizziness, speech difficulty, weakness, light-headedness and headaches  Psychiatric/Behavioral: Negative for behavioral problems, confusion, hallucinations and suicidal ideas  The patient is not nervous/anxious          Active Problem List     Patient Active Problem List   Diagnosis    Dental caries    Debility    Essential hypertension    CVA [facility]cerebral vascular accident) ([facility] [address] )    Lower extremity edema    Dyspepsia    Hemiparesis affecting left side as late effect of cerebrovascular accident ([address] )    Type 2 diabetes mellitus, without long-term current use of insulin ([facility])    Anemia of chronic disease    Obstructive sleep apnea    Hyperlipidemia    Constipation    GERD (gastroesophageal reflux disease)       Objective     Blood pressure 144/70, RR 16, temp 98 2°, blood sugar 131, HR 68, O2 sat 95%, weight 309 5lbs    Physical Exam   Constitutional: He is oriented to person, place, and time  He appears well-developed and well-nourished  No distress  Obese male sitting in wheelchair in no obvious cardiorespiratory distress   HENT:   Head: Normocephalic and atraumatic  Right Ear: External ear normal    Left Ear: External ear normal    Nose: Nose normal    Mouth/Throat: Oropharynx is clear and moist  No oropharyngeal exudate  Eyes: Conjunctivae are normal  Right eye exhibits no discharge  Left eye exhibits no discharge  No scleral icterus  Neck: Normal range of motion  Neck supple  No JVD present  Cardiovascular: Normal rate, regular rhythm and intact distal pulses  Exam reveals no gallop and no friction rub  Murmur (ESM 2/6) heard  Pulmonary/Chest: Effort normal and breath sounds normal  No stridor  No respiratory distress  He has no wheezes  He has no rales  Abdominal: Soft  Bowel sounds are normal  He exhibits no distension  There is no tenderness  There is no rebound and no guarding  Unable to assess thoroughly for mass due to patient's morbid obesity   Musculoskeletal: Normal range of motion  He exhibits edema (Chronic, minimal nonpitting lower extremity edema) and deformity (Left hemiparesis)  He exhibits no tenderness  Neurological: He is alert and oriented to person, place, and time  He has normal reflexes  No cranial nerve deficit  Skin: Skin is warm  No rash noted  He is not diaphoretic  No erythema  No pallor  Psychiatric: He has a normal mood and affect  Nursing note and vitals reviewed  Pertinent Laboratory/Diagnostic Studies:  Reviewed prior blood work from 10/24/2019  No new lab orders placed today    Current Medications   Medications were reviewed and updated in facility paper chart      Vicenta Yeung MD  Geriatric Medicine  11/20/2019 2:24 PM

## 2019-11-22 PROBLEM — G62.9 NEUROPATHY: Status: ACTIVE | Noted: 2019-11-22

## 2019-11-22 NOTE — ASSESSMENT & PLAN NOTE
Patient continues to use his CPAP machine at nighttime  Follow up routinely with Sleep Medicine for continued monitoring and titration of pressures as needed

## 2019-11-22 NOTE — ASSESSMENT & PLAN NOTE
History of CVA in right basal ganglion in 2017  Patient denies any new focal neurological deficits  Manage risk factors for CVA:  HTN, HLD, diabetes, obesity  Continue hydralazine/amlodipine/labetalol/lisinopril, atorvastatin  Continue PT/OT as needed  Maintain [place] precautions  Continue routine follow-up with Neurology as scheduled

## 2019-11-22 NOTE — ASSESSMENT & PLAN NOTE
Previous HbA1c done 8/19 was 5 0 8  Accu-Cheks remain stable and controlled on diet only  Recommend adherence to a diabetic, heart healthy diet  Patient encouraged to participate in physical activity as a form of exercise for weight loss  Will continue to monitor

## 2019-11-22 NOTE — ASSESSMENT & PLAN NOTE
Continue MiraLax 17 g every other day  Encouraged increase fiber intake  Physical activity as tolerated  Nursing home bowel regimen as needed  Will continue to monitor

## 2019-11-22 NOTE — ASSESSMENT & PLAN NOTE
Debility secondary to history of CVA, morbid obesity, chronic comorbidities  Patient continues to require 24/7 supervision and assistance with ADLs in order to function as per his baseline  Manage chronic conditions  Maintain [place] precautions  Continue nutritional support, supportive care  PT/OT/speech therapy as needed  Will continue to monitor

## 2019-11-22 NOTE — ASSESSMENT & PLAN NOTE
Patient explains symptoms controlled on current regimen  Continue gabapentin 300 mg p o  Q 12h  Physical activity encouraged  Continue adherence to a diabetic, heart healthy diet  Last HbA1c 5 8 on 08/19  Accu-Cheks remain controlled  Will continue to monitor

## 2019-11-22 NOTE — ASSESSMENT & PLAN NOTE
Symptoms remain stable on current regimen  Continue famotidine 20 mg p o   Daily  Continue anti reflux measures  Will continue to monitor

## 2019-12-18 ENCOUNTER — NURSING HOME VISIT (OUTPATIENT)
Dept: GERIATRICS | Facility: OTHER | Age: 69
End: 2019-12-18
Payer: MEDICARE

## 2019-12-18 DIAGNOSIS — R60.0 LOWER EXTREMITY EDEMA: Primary | ICD-10-CM

## 2019-12-18 DIAGNOSIS — E66.9 OBESITY, UNSPECIFIED CLASSIFICATION, UNSPECIFIED OBESITY TYPE, UNSPECIFIED WHETHER SERIOUS COMORBIDITY PRESENT: ICD-10-CM

## 2019-12-18 PROCEDURE — 99308 SBSQ NF CARE LOW MDM 20: CPT | Performed by: NURSE PRACTITIONER

## 2019-12-19 PROBLEM — E66.9 OBESITY: Status: ACTIVE | Noted: 2019-12-19

## 2019-12-19 NOTE — PROGRESS NOTES
[facility]  [address]  [phone]  [address]  Code 32      NAME: Gabrielle Best  AGE: 78 y o  SEX: male    : 1950    Code Status: CPR    DATE OF ENCOUNTER: 2019    Assessment and Plan     Problem List Items Addressed This Visit        Other    Lower extremity edema - Primary     +2-3 CRISTOBAL bilaterally; increased from baseline   -c/o mild shortness of breath   -continue lasix 40 mg daily with potassium supplementation; additional 20 mg lasix ordered w/ potassium for the next 2 days   -continue weight monitoring 3x/week  -continue TEDs  -continue to monitor for improvement and or worsening in condition   -DWN         Obesity     -300 lbs on 11/3/19; 316 lbs on    -slow increase in weight over the past year   -weight gain attributed to fluid weight as well as diet and lack of mobility secondary to prior CVA   -resident reports eating cookies every day   -education provided on eating snacks in moderation  -continue reduced calorie diet and c/s dietary as needed   -continue weights 3x/week  -resident strongly verbalized that he wants to work with therapy   -consult therapy for strengthening and to increase activity tolerance   -continue to provide motivation and encouragement   -continue family education and healthy diet choices   -DWN               No orders of the defined types were placed in this encounter  Chief Complaint     Weight gain     History of Present Illness     79 yo male, resident of [address] [place] [address], seen in collaboration with nursing to evaluate steady weight gain in relation to his obesity and eating habits and the management of his lower extremity edema  Nursing reports that family frequently brings in food from outside the facility which is not always healthy  No other concerns from nursing  Upon exam, resident resting comfortably in bed with no s/sx of distress or discomfort   He stresses the need to work with therapy and strongly wants to be able to walk again so that he can lose weight  Support given to resident  Otherwise, resident states that he is doing "okay "        The following portions of the patient's history were reviewed and updated as appropriate: allergies, current medications, past medical history and problem list     Review of Systems     Review of Systems   Constitutional: Positive for activity change (secondary to chronic medical conditions)  Negative for appetite change  Respiratory: Positive for shortness of breath (mild with exertion)  Negative for cough, chest tightness and wheezing  Cardiovascular: Positive for leg swelling  Negative for chest pain and palpitations  Genitourinary: Negative for difficulty urinating  Musculoskeletal: Positive for gait problem (2/2 to chronic medical conditions)  Neurological: Positive for weakness (2/2 to chronic medical conditions)  Negative for dizziness, light-headedness and headaches  Psychiatric/Behavioral: Negative for agitation, dysphoric mood and sleep disturbance  The patient is not nervous/anxious  Active Problem List     Patient Active Problem List   Diagnosis    Dental caries    Debility    Essential hypertension    [facility]    Lower extremity edema    Dyspepsia    Hemiparesis affecting left side as late effect of cerebrovascular accident ([facility]    Type 2 diabetes mellitus, without long-term current use of insulin ([facility] )    Anemia of chronic disease    Obstructive sleep apnea    Hyperlipidemia    Constipation    GERD (gastroesophageal reflux disease)    Neuropathy    Obesity       Objective     /76, HR 72, T 98 2, weight: 316 lbs (increased)    Physical Exam   Constitutional: He is oriented to person, place, and time  No distress  Appearing chronically ill    Cardiovascular: Normal rate, regular rhythm and intact distal pulses     +2-3 CRISTOBAL; TEDs applied Pulmonary/Chest: Effort normal  No respiratory distress  He has no wheezes  He has no rales  Abdominal:   Obese      Neurological: He is alert and oriented to person, place, and time  L sided hemiparesis 2/2 to CVA   Skin: Skin is warm and dry  No rash noted  He is not diaphoretic  No erythema  Psychiatric:   Cooperative during exam    Nursing note and vitals reviewed  Pertinent Laboratory/Diagnostic Studies:  Reviewed BMP 10/2019    Current Medications       Medications reviewed and updated, see facility [facility] for details         Lazarus Eth, CRNP   [facility]  12/19/2019 12:52 PM

## 2019-12-19 NOTE — ASSESSMENT & PLAN NOTE
+2-3 CRISTOBAL bilaterally; increased from baseline   -c/o mild shortness of breath   -continue lasix 40 mg daily with potassium supplementation; additional 20 mg lasix ordered w/ potassium for the next 2 days   -continue weight monitoring 3x/week  -continue TEDs  -continue to monitor for improvement and or worsening in condition   -DWN

## 2019-12-19 NOTE — ASSESSMENT & PLAN NOTE
-300 lbs on 11/3/19; 316 lbs on 12/17   -slow increase in weight over the past year   -weight gain attributed to fluid weight as well as diet and lack of mobility secondary to prior CVA   -resident reports eating cookies every day   -education provided on eating snacks in moderation  -continue reduced calorie diet and c/s dietary as needed   -continue weights 3x/week  -resident strongly verbalized that he wants to work with therapy   -consult therapy for strengthening and to increase activity tolerance   -continue to provide motivation and encouragement   -continue family education and healthy diet choices   -DENG

## 2020-01-09 ENCOUNTER — NURSING HOME VISIT (OUTPATIENT)
Dept: GERIATRICS | Facility: OTHER | Age: 70
End: 2020-01-09
Payer: MEDICARE

## 2020-01-09 DIAGNOSIS — Z78.9 POLST (PHYSICIAN ORDERS FOR LIFE-SUSTAINING TREATMENT): Primary | ICD-10-CM

## 2020-01-09 PROCEDURE — 99307 SBSQ NF CARE SF MDM 10: CPT | Performed by: NURSE PRACTITIONER

## 2020-01-10 PROBLEM — Z78.9 POLST (PHYSICIAN ORDERS FOR LIFE-SUSTAINING TREATMENT): Status: ACTIVE | Noted: 2020-01-10

## 2020-01-10 NOTE — PROGRESS NOTES
[facility]  [address]  [phone]  [address]  Code 32      NAME: Estrella Claros  AGE: 78 y o  SEX: male    : 1950    Code Status: CPR    DATE OF ENCOUNTER: 2020    Assessment and Plan     Problem List Items Addressed This Visit        Other    POLST (Physician Orders for Life-Sustaining Treatment) - Primary     -assisted resident and spouse fill out POLST form   -discussed goals of care and end of life wishes   -education and support given   -resident wants to maintain his current CPR status and stated, "As long as my brain is working, I want compressions  "  -continue to provide education as needed   -discussed with nursing                No orders of the defined types were placed in this encounter  Chief Complaint     complete POLST form     History of Present Illness     79 yo male, resident of [facility], seen in collaboration with nursing to assist resident and spouse complete a POLST form  No concerns from nursing  Upon exam, resident sitting comfortably in his wheelchair with no s/sx of distress or discomfort; resident's wife supportive and interactive at bedside  Resident denied any new complaints during visit  The following portions of the patient's history were reviewed and updated as appropriate: allergies, current medications, past medical history and problem list     Review of Systems     Review of Systems   Constitutional: Positive for activity change (2/2 to chronic medical conditions )  Respiratory: Negative for shortness of breath  Cardiovascular: Negative for chest pain  Psychiatric/Behavioral: Negative for agitation, confusion and sleep disturbance  The patient is not nervous/anxious          Active Problem List     Patient Active Problem List   Diagnosis    Dental caries    Debility    Essential hypertension    [facility]) [facility]    Lower extremity edema    Dyspepsia    Hemiparesis affecting left side as late effect of cerebrovascular accident ([facility] )    Type 2 diabetes mellitus, without long-term current use of insulin (HCC)    Anemia of chronic disease    Obstructive sleep apnea    Hyperlipidemia    Constipation    GERD (gastroesophageal reflux disease)    Neuropathy    Obesity    POLST (Physician Orders for Life-Sustaining Treatment)       Objective       Physical Exam   Constitutional: He is oriented to person, place, and time  No distress  Appearing chronically ill    Pulmonary/Chest: No respiratory distress  Neurological: He is alert and oriented to person, place, and time  Skin: He is not diaphoretic  Psychiatric:   Cooperative during exam; wife supportive at bedside        Pertinent Laboratory/Diagnostic Studies:  N/A    Current Medications       Medications reviewed and updated, see facility [facility] for details         April Kehr, CRNP   [facility]  1/10/2020 5:30 PM

## 2020-01-10 NOTE — ASSESSMENT & PLAN NOTE
-assisted resident and spouse fill out POLST form   -discussed goals of care and end of life wishes   -education and support given   -resident wants to maintain his current CPR status and stated, "As long as my brain is working, I want compressions  "  -continue to provide education as needed    -POLST placed in paper chart   -discussed with nursing

## 2020-01-15 ENCOUNTER — NURSING HOME VISIT (OUTPATIENT)
Dept: GERIATRICS | Facility: OTHER | Age: 70
End: 2020-01-15
Payer: MEDICARE

## 2020-01-15 DIAGNOSIS — I63.9 CEREBROVASCULAR ACCIDENT (CVA), UNSPECIFIED MECHANISM (HCC): Primary | ICD-10-CM

## 2020-01-15 DIAGNOSIS — E78.5 HYPERLIPIDEMIA, UNSPECIFIED HYPERLIPIDEMIA TYPE: ICD-10-CM

## 2020-01-15 DIAGNOSIS — G62.9 NEUROPATHY: ICD-10-CM

## 2020-01-15 DIAGNOSIS — K21.9 GASTROESOPHAGEAL REFLUX DISEASE, ESOPHAGITIS PRESENCE NOT SPECIFIED: ICD-10-CM

## 2020-01-15 DIAGNOSIS — E11.40 TYPE 2 DIABETES MELLITUS WITH DIABETIC NEUROPATHY, WITHOUT LONG-TERM CURRENT USE OF INSULIN (HCC): ICD-10-CM

## 2020-01-15 DIAGNOSIS — I10 ESSENTIAL HYPERTENSION: ICD-10-CM

## 2020-01-15 DIAGNOSIS — R53.81 DEBILITY: ICD-10-CM

## 2020-01-15 DIAGNOSIS — G47.33 OBSTRUCTIVE SLEEP APNEA: ICD-10-CM

## 2020-01-15 DIAGNOSIS — K59.00 CONSTIPATION, UNSPECIFIED CONSTIPATION TYPE: ICD-10-CM

## 2020-01-15 DIAGNOSIS — E66.9 OBESITY, UNSPECIFIED CLASSIFICATION, UNSPECIFIED OBESITY TYPE, UNSPECIFIED WHETHER SERIOUS COMORBIDITY PRESENT: ICD-10-CM

## 2020-01-15 DIAGNOSIS — R60.0 LOWER EXTREMITY EDEMA: ICD-10-CM

## 2020-01-15 PROCEDURE — 99309 SBSQ NF CARE MODERATE MDM 30: CPT | Performed by: NURSE PRACTITIONER

## 2020-01-15 NOTE — PROGRESS NOTES
[facility]  [address]  [phone]  [address]  Code 32    NAME: Kayli Crocker  AGE: [address] y o   SEX: male    : 1950    Code Status: CPR    DATE OF ENCOUNTER: 1/15/2020    Assessment and Plan     Problem List Items Addressed This Visit        Digestive    GERD (gastroesophageal reflux disease)     -denies s/sx   -continue diet modifications and medication management with famotidine 20 mg daily             Endocrine    Type 2 diabetes mellitus, without long-term current use of insulin (HCC)     -fasting BG logs reviewed and stable without medication management   -last hgb A1c 5 8% (2019)  -continue lifestyle modifications   -continue monitoring             Respiratory    Obstructive sleep apnea     -compliant with CPAP while sleeping   -continue to monitor for any change in clinical status             Cardiovascular and Mediastinum    Essential hypertension     -BP logs reviewed and stable  -continue Norvasc, lasix, hydralazine, labetalol and lisinopril   -continue monitoring          CVA (cerebral vascular accident) ([facility] ) - Primary     -primary diagnosis for LTC placement   -no acute change in neurological condition   -continue BP and Lipid management   -continue collaboration with neurology service as needed (last visit 2018)  -continue to provide safe and supportive environment   -continue to monitor             Nervous and Auditory    Neuropathy     -s/sx controlled with current regimen of gabapentin 300 mg q 12   -continue to monitor for any clinical decline               Other    Debility     -secondary to his chronic medical conditions   -continue 24/ support at LTCF         Lower extremity edema     -lower extremity edema stable with no s/sx of worsening in condition   -continue lasix 40 mg daily w/ potassium supplementation   -continue weight monitoring   -continue to monitor          Hyperlipidemia     -stable and doing well with atorvastatin 40 mg daily   -last lipid panel 2/2019; will order for annual periodic lab surveillance  -continue lifestyle modifications           Constipation     -denies s/sx   -continue colace BID            Obesity     -improving   -weight 304 5 lbs on 1/14 and 313 lbs on 12/31  -resident reports doing well with therapy and using the bike for upper exercises   -resident motivated to lose weight and increase his activity tolerance   -continue reduced calorie diet   -continue to encourage activity as tolerated                No orders of the defined types were placed in this encounter  Chief Complaint     Follow-up of chronic medical conditions  History of Present Illness     79 yo male, resident of [facility], seen in collaboration with nursing to evaluate his chronic medical conditions including not limited to CVA, HTN, obesity, neuropathy, GERD, constipation, lower extremity edema and debility  No concerns from nursing  Upon exam, resident sitting comfortably in his wheelchair with no s/sx of distress or discomfort  Resident was excited to report that he is now working with therapy and using the bike to increase his upper strength  He is motivated to increase his activity tolerance so that he can go out on [place] with his wife to dinner  He denies pain, headache, blurry vision, shortness of breath (only reports baseline PRESSLEY), chest pain, palpations, N/V/D or constipation  The following portions of the patient's history were reviewed and updated as appropriate: allergies, current medications, past family history, past medical history and problem list     Review of Systems     Review of Systems   Constitutional: Positive for activity change (2/2 to chronic medical conditions )  Negative for appetite change, chills, diaphoresis and fever  HENT: Negative  Eyes: Negative for visual disturbance     Respiratory: Negative for cough, choking, chest tightness, shortness of breath (+ PRESSLEY) and wheezing  Cardiovascular: Positive for leg swelling (chronic )  Negative for chest pain and palpitations  Gastrointestinal: Negative for abdominal distention, abdominal pain, constipation, diarrhea and nausea  Musculoskeletal: Positive for gait problem (2/2 to chronic medical conditions)  Negative for myalgias  Skin: Negative  Neurological: Positive for weakness (L sided weakness 2/2 to prior CVA)  Negative for dizziness, speech difficulty, light-headedness and headaches  Psychiatric/Behavioral: Negative for agitation, confusion and sleep disturbance  The patient is not nervous/anxious  Active Problem List     Patient Active Problem List   Diagnosis    Dental caries    Debility    Essential hypertension    [facility]) [facility]    Lower extremity edema    Dyspepsia    Hemiparesis affecting left side as late effect of cerebrovascular accident ([facility] )    Type 2 diabetes mellitus, without long-term current use of insulin [facility]    Anemia of chronic disease    Obstructive sleep apnea    Hyperlipidemia    Constipation    GERD (gastroesophageal reflux disease)    Neuropathy    Obesity    POLST (Physician Orders for Life-Sustaining Treatment)       Objective     /68, HR 68, RR 16, , weight: 313 lb (stable)    Physical Exam   Constitutional: He is oriented to person, place, and time  No distress  Appearing chronically ill    Eyes: Pupils are equal, round, and reactive to light  Conjunctivae and EOM are normal  Right eye exhibits no discharge  Left eye exhibits no discharge  Cardiovascular: Normal rate, regular rhythm and intact distal pulses  +1 B/L CRISTOBAL ; +compression stockings bilaterally    Pulmonary/Chest: Effort normal and breath sounds normal  No respiratory distress  He has no wheezes  He has no rales  Abdominal: Soft  Bowel sounds are normal  He exhibits no distension  There is no tenderness     Rounded; obese     Musculoskeletal:   L arm/hand contracted 2/2 to CVA     Neurological: He is alert and oriented to person, place, and time  L sided hemiparesis    Skin: Skin is warm and dry  Capillary refill takes less than 2 seconds  No rash noted  He is not diaphoretic  No erythema  Psychiatric:   Pleasant and cooperative during exam    Nursing note and vitals reviewed  Pertinent Laboratory/Diagnostic Studies:  Reviewed   BMP 10/2019  hgb A1c 8/2019  CBC 8/2019  Lipids 2/2019    **ordered routine lab work for 4/2020**    Consults   Neuro PRN 12/2018  Podiatry 11/2019  Ophthalmology 11/2019    Current Medications       Medications reviewed and updated, see facility [facility] for details  PRN usage: Tylenol x 3 and ETOH drink x 1 in the past 30 days       KSENIA Bee   [facility]  1/16/2020 9:02 PM

## 2020-01-17 NOTE — ASSESSMENT & PLAN NOTE
-improving   -weight 304 5 lbs on 1/14 and 313 lbs on 12/31  -resident reports doing well with therapy and using the bike for upper exercises   -resident motivated to lose weight and increase his activity tolerance   -continue reduced calorie diet   -continue to encourage activity as tolerated

## 2020-01-17 NOTE — ASSESSMENT & PLAN NOTE
-primary diagnosis for LTC placement   -no acute change in neurological condition   -continue BP and Lipid management   -continue collaboration with neurology service as needed (last visit 12/2018)  -continue to provide safe and supportive environment   -continue to monitor

## 2020-01-17 NOTE — ASSESSMENT & PLAN NOTE
-BP logs reviewed and stable  -continue Norvasc, lasix, hydralazine, labetalol and lisinopril   -continue monitoring

## 2020-01-17 NOTE — ASSESSMENT & PLAN NOTE
-lower extremity edema stable with no s/sx of worsening in condition   -continue lasix 40 mg daily w/ potassium supplementation   -continue weight monitoring   -continue to monitor

## 2020-01-17 NOTE — ASSESSMENT & PLAN NOTE
-s/sx controlled with current regimen of gabapentin 300 mg q 12   -continue to monitor for any clinical decline

## 2020-01-17 NOTE — ASSESSMENT & PLAN NOTE
-stable and doing well with atorvastatin 40 mg daily   -last lipid panel 2/2019; will order for annual periodic lab surveillance  -continue lifestyle modifications

## 2020-01-17 NOTE — ASSESSMENT & PLAN NOTE
-fasting BG logs reviewed and stable without medication management   -last hgb A1c 5 8% (8/2019)  -continue lifestyle modifications   -continue monitoring

## 2020-03-13 ENCOUNTER — NURSING HOME VISIT (OUTPATIENT)
Dept: GERIATRICS | Facility: OTHER | Age: 70
End: 2020-03-13
Payer: MEDICARE

## 2020-03-13 DIAGNOSIS — R60.0 LOWER EXTREMITY EDEMA: Primary | ICD-10-CM

## 2020-03-13 PROCEDURE — 99308 SBSQ NF CARE LOW MDM 20: CPT | Performed by: NURSE PRACTITIONER

## 2020-03-14 NOTE — ASSESSMENT & PLAN NOTE
-bilateral +2-3 pitting CRISTOBAL   -4 lb weight gain in 2 days  -per nursing, resident has been eating a lot of chips and pretzels, courtesy of visitors; however resident denies any change in diet   -resident denies shortness of breath, chest pain or palpitations   -edema accompanied by 3 small areas of erythema (R LE); no concern of infection at this time; will re-evaluate if condition changes   -current medication regimen includes lasix 40 mg daily with KDUR 40 meq BID   -will order extra lasix 20 mg x 3 days with extra 10 meq of KDUR  -add sodium restriction to calorie reduced diet   -continue ABDULAZIZ compression stockings   -continue weights 3x/week   -continue to monitor for any change in clinical condition   -SHORTYN

## 2020-03-14 NOTE — PROGRESS NOTES
[facility]  [address]one Flores [address]  [phone]  [address]  Code 32      NAME: Marcos Lagunas  AGE: 78 y o  SEX: male    : 1950    Code Status: CPR    DATE OF ENCOUNTER: 3/13/2020    Assessment and Plan     Problem List Items Addressed This Visit        Other    Lower extremity edema - Primary     -bilateral +2-3 pitting CRISTOBAL   -4 lb weight gain in 2 days  -per nursing, resident has been eating a lot of chips and pretzels, courtesy of visitors; however resident denies any change in diet   -resident denies shortness of breath, chest pain or palpitations   -edema accompanied by 3 small areas of erythema (R LE); no concern of infection at this time; will re-evaluate if condition changes   -current medication regimen includes lasix 40 mg daily with KDUR 40 meq BID   -will order extra lasix 20 mg x 3 days with extra 10 meq of KDUR  -add sodium restriction to calorie reduced diet   -continue ABDULAZIZ compression stockings   -continue weights 3x/week   -continue to monitor for any change in clinical condition   -DWN                No orders of the defined types were placed in this encounter  Chief Complaint     Increased leg swelling     History of Present Illness     79 yo male, resident of [address], seen in collaboration with nursing to evaluate change in condition  Per nursing, resident has developed increased lower extremity edema in comparison to baseline which is accompanied by 4 lb weight gain in 2 days and minimal areas of redness to his right lower extremity  Per nursing staff, resident has been snacking regularly, in particular, bags of chips brought in by visitors  No other concerns from nursing  Upon exam, resident sitting comfortably in his wheelchair with no s/sx of distress or discomfort    He reports that his legs feel more swollen than usual, but denies pain, shortness of breath, chest pain, palpitations, fever, chills or N/V/D  The following portions of the patient's history were reviewed and updated as appropriate: allergies, current medications, past medical history and problem list     Review of Systems     Review of Systems   Constitutional: Positive for activity change (2/2 to chronic medical conditions )  Negative for appetite change, chills and fever  Respiratory: Negative for cough, chest tightness, shortness of breath and wheezing  Cardiovascular: Positive for leg swelling  Negative for chest pain and palpitations  Gastrointestinal: Negative for abdominal pain, constipation, diarrhea and nausea  Musculoskeletal: Positive for gait problem (2/2 to chronic medical conditions )  Neurological: Positive for weakness (2/2 to chronic medical conditions )  Psychiatric/Behavioral: The patient is not nervous/anxious  Active Problem List     Patient Active Problem List   Diagnosis    Dental caries    Debility    Essential hypertension    [facility]    Lower extremity edema    Dyspepsia    Hemiparesis affecting left side as late effect of cerebrovascular accident ([facility] )    Type 2 diabetes mellitus, without long-term current use of insulin ([facility] )    Anemia of chronic disease    Obstructive sleep apnea    Hyperlipidemia    Constipation    GERD (gastroesophageal reflux disease)    Neuropathy    Obesity    POLST (Physician Orders for Life-Sustaining Treatment)       Objective     /80, HR 78, T 97 6, weight: 317 5 lbs (increased)    Physical Exam   Constitutional: He is oriented to person, place, and time  No distress  Appearing chronically ill    Cardiovascular: Normal rate, regular rhythm and intact distal pulses  +2-3 pitting bilateral CRISTOBAL    Pulmonary/Chest: Effort normal and breath sounds normal  No respiratory distress  He has no wheezes  He has no rales  Abdominal: Soft  Bowel sounds are normal  He exhibits distension (mild; baseline )   There is no tenderness  There is no guarding  Obese      Neurological: He is alert and oriented to person, place, and time  Skin: Skin is warm and dry  No rash noted  He is not diaphoretic  There is erythema (small areas of inner right calf; no s/sx of infection )  Psychiatric:   Cooperative during visit    Nursing note and vitals reviewed  Pertinent Laboratory/Diagnostic Studies:  Reviewed     Current Medications       Medications reviewed and updated, see facility [facility] for details         KSENIA Matthews   [facility]  3/14/2020 11:46 AM

## 2020-03-15 ENCOUNTER — NURSING HOME VISIT (OUTPATIENT)
Dept: GERIATRICS | Facility: OTHER | Age: 70
End: 2020-03-15
Payer: MEDICARE

## 2020-03-15 DIAGNOSIS — I10 HYPERTENSION, UNSPECIFIED TYPE: Primary | ICD-10-CM

## 2020-03-15 DIAGNOSIS — D63.8 ANEMIA OF CHRONIC DISEASE: ICD-10-CM

## 2020-03-15 DIAGNOSIS — E78.5 HYPERLIPIDEMIA, UNSPECIFIED HYPERLIPIDEMIA TYPE: ICD-10-CM

## 2020-03-15 DIAGNOSIS — I69.354 HEMIPARESIS AFFECTING LEFT SIDE AS LATE EFFECT OF CEREBROVASCULAR ACCIDENT (HCC): ICD-10-CM

## 2020-03-15 DIAGNOSIS — G62.9 NEUROPATHY: ICD-10-CM

## 2020-03-15 DIAGNOSIS — R53.81 DEBILITY: ICD-10-CM

## 2020-03-15 DIAGNOSIS — G47.33 OBSTRUCTIVE SLEEP APNEA: ICD-10-CM

## 2020-03-15 DIAGNOSIS — K59.00 CONSTIPATION, UNSPECIFIED CONSTIPATION TYPE: ICD-10-CM

## 2020-03-15 DIAGNOSIS — R60.0 LOWER EXTREMITY EDEMA: ICD-10-CM

## 2020-03-15 DIAGNOSIS — E11.40 TYPE 2 DIABETES MELLITUS WITH DIABETIC NEUROPATHY, WITHOUT LONG-TERM CURRENT USE OF INSULIN (HCC): ICD-10-CM

## 2020-03-15 PROCEDURE — 99309 SBSQ NF CARE MODERATE MDM 30: CPT | Performed by: INTERNAL MEDICINE

## 2020-03-16 NOTE — PROGRESS NOTES
[facility]  [address] [place] [address]  Facility: [address] 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  -he is doing well with atorvastatin and blood pressure control  -no aspirin therapy secondary to significant atraumatic intracranial hemorrhage  -continue with monitoring and neurology service    4  Lower extremity edema  -multifactorial including venous hypertension and side effect to medications  -continue with diuretic therapy as ordered  -given underlying medical conditions, dose reduction of his amlodipine and gabapentin are not indicated  -continue with monitoring    5  Debility secondary to his chronic medical conditions  -he still requires 24/7 care/support of his ADLs  -continue with care/support of his ADLs at long-term care facility level of care  -continue with monitoring    6  Type 2 diabetes mellitus  -review of his fingerstick blood sugar log looks well with TLC  -continue with clinical and periodic laboratory monitoring    7  Anemia chronic disease  -asymptomatic  -continue with clinical and periodic laboratory monitoring    8  Obstructive sleep apnea  -he is using his CPAP machine on a regular basis  -continue with monitoring    9  Constipation  -he reports that he is doing well with docusate sodium  -continue with monitoring    10  Hyperlipidemia  -doing well with atorvastatin  -continue with periodic laboratory monitoring    11   GERD  -continue with famotidine and monitoring     All medications and routine orders were reviewed and updated as needed  Plan discussed with: Nurse    Chief Complaint     He is seen with nursing staff for a follow-up visit to update the care and treatment of his hypertension, debility secondary to his chronic medical conditions, lower extremity edema, and history of CVA  History of Present Illness     He is a pleasant 24-year-old gentleman who is seen for a follow-up visit with nursing staff to update the care and treatment of his hypertension-doing well with hydralazine/amlodipine/labetalol/lisinopril, debility secondary to his chronic medical conditions-he still requires 24/7 care/support of his ADLs, lower extremity edema-currently receiving a short course of increased diuretic, and history of CVA-doing well with care/support at long-term care facility/control of his hypertension  When asked, he endorses not having any complaints or concerns during the visit  Nursing reported recent increased edema accompanied by weight gain  He is currently receiving a short course of increased diuretic dosing  He does not have any pain is lower extremities  He does note continued discomfort in his left arm after his CVA that is doing well with gabapentin  The following portions of the patient's history were reviewed and updated as appropriate: current medications, past medical history, past social history, and problem list     Allergies:  No Known Allergies    Review of Systems     Review of Systems   Constitutional: Negative for appetite change  Respiratory: Negative for shortness of breath  Cardiovascular: Positive for leg swelling  Negative for chest pain and palpitations  Gastrointestinal: Negative for abdominal pain and constipation  Neurological:        See HPI  Medications and orders     All medications reviewed and updated in Nursing Home EMR        Objective     Vitals:  Weight 217 lb, pulse 78, blood pressure 142/80, fasting fingerstick blood sugar 107 (March 8, 2020)  I reviewed his fingerstick sugar log, BP log, and AP log  Physical Exam   Constitutional: Vital signs are normal  He appears well-developed and well-nourished  He is cooperative  Non-toxic appearance  No distress  He is awake and appears comfortable sitting in his wheelchair, his stated age, and chronically ill  Eyes: No scleral icterus  Cardiovascular: Normal rate and regular rhythm  Exam reveals no gallop and no friction rub  No murmur heard  Pulmonary/Chest: Effort normal and breath sounds normal  No stridor  No respiratory distress  He has no wheezes  He has no rales  Abdominal: Soft  He exhibits no distension and no mass  There is no tenderness  There is no guarding  Musculoskeletal: He exhibits edema (Brawny edema of bilateral lower extremities to knees  )  Neurological: He is alert  Skin: Skin is warm and dry  There are to reddened areas on his right lower extremity associated with his edema that are not warm, tender, or draining  There is no induration  Psychiatric: He has a normal mood and affect  His behavior is normal    Vitals reviewed  - Treatment plan reviewed with nursing staff      ALMA Morales   3/15/2020 11:47 PM

## 2020-05-06 ENCOUNTER — NURSING HOME VISIT (OUTPATIENT)
Dept: GERIATRICS | Facility: OTHER | Age: 70
End: 2020-05-06
Payer: MEDICARE

## 2020-05-06 DIAGNOSIS — R53.81 DEBILITY: ICD-10-CM

## 2020-05-06 DIAGNOSIS — G62.9 NEUROPATHY: ICD-10-CM

## 2020-05-06 DIAGNOSIS — K21.9 GASTROESOPHAGEAL REFLUX DISEASE, ESOPHAGITIS PRESENCE NOT SPECIFIED: ICD-10-CM

## 2020-05-06 DIAGNOSIS — G47.33 OBSTRUCTIVE SLEEP APNEA: ICD-10-CM

## 2020-05-06 DIAGNOSIS — I63.9 CEREBROVASCULAR ACCIDENT (CVA), UNSPECIFIED MECHANISM (HCC): Primary | ICD-10-CM

## 2020-05-06 DIAGNOSIS — I10 HYPERTENSION, UNSPECIFIED TYPE: ICD-10-CM

## 2020-05-06 DIAGNOSIS — E11.40 TYPE 2 DIABETES MELLITUS WITH DIABETIC NEUROPATHY, WITHOUT LONG-TERM CURRENT USE OF INSULIN (HCC): ICD-10-CM

## 2020-05-06 DIAGNOSIS — K59.00 CONSTIPATION, UNSPECIFIED CONSTIPATION TYPE: ICD-10-CM

## 2020-05-06 DIAGNOSIS — E78.5 HYPERLIPIDEMIA, UNSPECIFIED HYPERLIPIDEMIA TYPE: ICD-10-CM

## 2020-05-06 DIAGNOSIS — I69.354 HEMIPARESIS AFFECTING LEFT SIDE AS LATE EFFECT OF CEREBROVASCULAR ACCIDENT (HCC): ICD-10-CM

## 2020-05-06 PROCEDURE — 99309 SBSQ NF CARE MODERATE MDM 30: CPT | Performed by: STUDENT IN AN ORGANIZED HEALTH CARE EDUCATION/TRAINING PROGRAM

## 2020-05-19 ENCOUNTER — NURSING HOME VISIT (OUTPATIENT)
Dept: GERIATRICS | Facility: OTHER | Age: 70
End: 2020-05-19
Payer: MEDICARE

## 2020-05-19 DIAGNOSIS — R50.9 FEVER, UNSPECIFIED FEVER CAUSE: Primary | ICD-10-CM

## 2020-05-19 PROCEDURE — 99309 SBSQ NF CARE MODERATE MDM 30: CPT | Performed by: NURSE PRACTITIONER

## 2020-07-01 ENCOUNTER — NURSING HOME VISIT (OUTPATIENT)
Dept: GERIATRICS | Facility: OTHER | Age: 70
End: 2020-07-01
Payer: MEDICARE

## 2020-07-01 DIAGNOSIS — E78.5 HYPERLIPIDEMIA, UNSPECIFIED HYPERLIPIDEMIA TYPE: ICD-10-CM

## 2020-07-01 DIAGNOSIS — R60.0 LOWER EXTREMITY EDEMA: ICD-10-CM

## 2020-07-01 DIAGNOSIS — E11.40 TYPE 2 DIABETES MELLITUS WITH DIABETIC NEUROPATHY, WITHOUT LONG-TERM CURRENT USE OF INSULIN (HCC): ICD-10-CM

## 2020-07-01 DIAGNOSIS — K21.9 GASTROESOPHAGEAL REFLUX DISEASE, ESOPHAGITIS PRESENCE NOT SPECIFIED: ICD-10-CM

## 2020-07-01 DIAGNOSIS — I63.9 CEREBROVASCULAR ACCIDENT (CVA), UNSPECIFIED MECHANISM (HCC): Primary | ICD-10-CM

## 2020-07-01 DIAGNOSIS — I10 HYPERTENSION, UNSPECIFIED TYPE: ICD-10-CM

## 2020-07-01 DIAGNOSIS — R53.81 DEBILITY: ICD-10-CM

## 2020-07-01 DIAGNOSIS — G47.33 OBSTRUCTIVE SLEEP APNEA: ICD-10-CM

## 2020-07-01 DIAGNOSIS — G62.9 NEUROPATHY: ICD-10-CM

## 2020-07-01 DIAGNOSIS — K59.00 CONSTIPATION, UNSPECIFIED CONSTIPATION TYPE: ICD-10-CM

## 2020-07-01 PROCEDURE — 99309 SBSQ NF CARE MODERATE MDM 30: CPT | Performed by: NURSE PRACTITIONER

## 2020-07-01 NOTE — PROGRESS NOTES
[facility]  [address]  [phone]  [address]  [address]      NAME: Tad Tapia  AGE: 78 y o   SEX: male    : 1950    Code Status: CPR    DATE OF ENCOUNTER: 2020    Assessment and Plan     Problem List Items Addressed This Visit        Digestive    GERD (gastroesophageal reflux disease)     -stable and doing well with famotidine 20 mg HS  -continue diet modifications and encourage sitting upright for 30-60 minutes after meals             Endocrine    Type 2 diabetes mellitus, without long-term current use of insulin (HCC)     -blood glucose logs reviewed and stable without medication management  -no reports symptoms of hypo/hyperglycemia  -last hemoglobin A1c 6 1% (3/2020)  -continue lifestyle and diet modifications  -continue monitoring            Respiratory    Obstructive sleep apnea     -compliant with CPAP machine  -continue monitoring            Cardiovascular and Mediastinum    Hypertension     -BP logs reviewed and stable  -continue medication management including hydralazine, amlodipine, labetalol and lisinopril  -continue low sodium diet and lifestyle modifications         CVA (cerebral vascular accident) ([facility] [address] ) - Primary     -s/p R basal ganglia ICH with left sided hemiparesis   -no deviation from baseline and no new neurological focal deficits  -continue HTN, DM II and HL control   -c/s SLP/OT/PT as needed   -continue to provide safe and supportive environment  -collaborate with Neurology as needed            Nervous and Auditory    Neuropathy     -stable and doing well with gabapentin  -continue monitoring            Other    Debility     -secondary to his chronic medical conditions   -continue 24/7 support at LTCF         Lower extremity edema     -no acute change in condition  -weights stable; continue monitoring   -stable and doing well with Lasix and potassium supplementation  -continue to monitor Hyperlipidemia     -stable and doing well with atorvastatin  -continue diet modifications  -last lipid panel 4/7/2020  -continue periodic lab surveillance         Constipation     -reporting intermittent symptoms  -will d/c colace BID and start senna-s, 2 tabs daily   -continue to encourage activity and adequate fluid/fiber intake  -continue monitoring               No orders of the defined types were placed in this encounter  Chief Complaint     Follow-up of chronic medical conditions  History of Present Illness     79 yo male, resident of [facility], seen in collaboration with nursing to evaluate his chronic medical conditions including but not limited to CVA, HTN, GERD, DM II, HL, GERARD, neuropathy, constipation and debility  No new concerns from nursing  Upon exam, resident sitting comfortably in his wheelchair with no acute symptoms of distress or discomfort  He appears clinically stable and at baseline  He denies decreased appetite, fever, chills, difficulty sleeping, anxiety, cough, chest pain, palpitations, or N/V/D  Resident verbalized his frustrations in relation to his limitations secondary to prior stroke; support and active listening provided  He also reports constipation and is requesting additional medication to maintain a more regular bowel movement  Resident calm and cooperative during visit  The following portions of the patient's history were reviewed and updated as appropriate: allergies, current medications, past family history (no pertinent FH), past medical history, past social history, past surgical history and problem list     Review of Systems     Review of Systems   Constitutional: Positive for activity change (2/2 to chronic medical conditions )  Negative for appetite change, chills, diaphoresis and fever  HENT: Negative  Eyes: Negative for visual disturbance  Respiratory: Positive for shortness of breath (occasionally )   Negative for cough, chest tightness and wheezing  Cardiovascular: Positive for leg swelling (chronic )  Negative for chest pain and palpitations  Gastrointestinal: Positive for constipation  Negative for abdominal distention, abdominal pain, diarrhea and nausea  Musculoskeletal: Positive for arthralgias (chronic ) and gait problem (2/2 to chronic medical conditions )  Skin: Negative  Neurological: Positive for weakness (left sided 2/2 to prior stroke )  Negative for dizziness, light-headedness and headaches  Psychiatric/Behavioral: Negative for agitation, confusion, dysphoric mood and sleep disturbance  The patient is not nervous/anxious  Active Problem List     Patient Active Problem List   Diagnosis    Dental caries    Debility    Hypertension    [facility]) [facility] [address] [facility]    Lower extremity edema    Dyspepsia    Hemipares[facility] affecting left [facility] as late [facility]    Type 2 diabetes mellitus, without long-term current use of insulin ([facility])    Anemia of chronic disease    Obstructive sleep apnea    Hyperlipidemia    Constipation    GERD (gastroesophageal reflux disease)    Neuropathy    Obesity    POLST (Physician Orders for Life-Sustaining Treatment)    Fever       Objective     VS:  /72, HR 56, RR 18, T 98 0,  (stable), weight: 296 5 lbs (stable)    Physical Exam   Constitutional: He is oriented to person, place, and time  No distress  Appearing chronically ill    HENT:   Head: Normocephalic and atraumatic  Nose: Nose normal    Eyes: Pupils are equal, round, and reactive to light  Conjunctivae and EOM are normal  Right eye exhibits no discharge  Left eye exhibits no discharge  Cardiovascular: Normal rate, regular rhythm and intact distal pulses  +1 CRISTOBAL bilaterally      Pulmonary/Chest: Effort normal and breath sounds normal  No accessory muscle usage  No tachypnea  No respiratory distress  He has no wheezes  He has no rales     No cough or audible congestion noted    Abdominal: Soft  Bowel sounds are normal  He exhibits no distension  There is no tenderness  There is no guarding  Obese      Musculoskeletal:   Wheelchair bound    Neurological: He is alert and oriented to person, place, and time  Left sided hemiparesis 2/2 to prior CVA   Skin: Skin is warm and dry  Capillary refill takes less than 2 seconds  No rash noted  He is not diaphoretic  No erythema  No pallor  Psychiatric: His mood appears not anxious  He is not agitated and not aggressive  He does not exhibit a depressed mood  Pleasant, calm and cooperative    Nursing note and vitals reviewed  Pertinent Laboratory/Diagnostic Studies:  Reviewed  CBC, CBC 5/20/20  TSH, hgb A1c 03/2020  Lipid panel 4/2020    Consults   Podiatry 3/2020    Current Medications       Medications reviewed and updated, see facility [facility] for details         Marina Casey, [address]   [facility]  7/3/2020 8:50 PM

## 2020-07-03 NOTE — ASSESSMENT & PLAN NOTE
-stable and doing well with famotidine 20 mg HS  -continue diet modifications and encourage sitting upright for 30-60 minutes after meals

## 2020-07-03 NOTE — ASSESSMENT & PLAN NOTE
-BP logs reviewed and stable  -continue medication management including hydralazine, amlodipine, labetalol and lisinopril  -continue low sodium diet and lifestyle modifications

## 2020-07-03 NOTE — ASSESSMENT & PLAN NOTE
-reporting intermittent symptoms  -will d/c colace BID and start senna-s, 2 tabs daily   -continue to encourage activity and adequate fluid/fiber intake  -continue monitoring

## 2020-07-03 NOTE — ASSESSMENT & PLAN NOTE
-blood glucose logs reviewed and stable without medication management  -no reports symptoms of hypo/hyperglycemia  -last hemoglobin A1c 6 1% (3/2020)  -continue lifestyle and diet modifications  -continue monitoring

## 2020-07-03 NOTE — ASSESSMENT & PLAN NOTE
-no acute change in condition  -weights stable; continue monitoring   -stable and doing well with Lasix and potassium supplementation  -continue to monitor

## 2020-07-03 NOTE — ASSESSMENT & PLAN NOTE
-s/p R basal ganglia ICH with left sided hemiparesis   -no deviation from baseline and no new neurological focal deficits  -continue HTN, DM II and HL control   -c/s SLP/OT/PT as needed   -continue to provide safe and supportive environment  -collaborate with Neurology as needed

## 2020-07-03 NOTE — ASSESSMENT & PLAN NOTE
-stable and doing well with atorvastatin  -continue diet modifications  -last lipid panel 4/7/2020  -continue periodic lab surveillance

## 2020-09-01 ENCOUNTER — NURSING HOME VISIT (OUTPATIENT)
Dept: GERIATRICS | Facility: OTHER | Age: 70
End: 2020-09-01
Payer: MEDICARE

## 2020-09-01 DIAGNOSIS — Z71.89 GOALS OF CARE, COUNSELING/DISCUSSION: ICD-10-CM

## 2020-09-01 DIAGNOSIS — K59.00 CONSTIPATION, UNSPECIFIED CONSTIPATION TYPE: ICD-10-CM

## 2020-09-01 DIAGNOSIS — R60.0 LOWER EXTREMITY EDEMA: ICD-10-CM

## 2020-09-01 DIAGNOSIS — E11.40 TYPE 2 DIABETES MELLITUS WITH DIABETIC NEUROPATHY, WITHOUT LONG-TERM CURRENT USE OF INSULIN (HCC): ICD-10-CM

## 2020-09-01 DIAGNOSIS — E78.5 HYPERLIPIDEMIA, UNSPECIFIED HYPERLIPIDEMIA TYPE: ICD-10-CM

## 2020-09-01 DIAGNOSIS — I10 HYPERTENSION, UNSPECIFIED TYPE: ICD-10-CM

## 2020-09-01 DIAGNOSIS — I69.30 HISTORY OF HEMORRHAGIC CEREBROVASCULAR ACCIDENT (CVA) WITH RESIDUAL DEFICIT: ICD-10-CM

## 2020-09-01 DIAGNOSIS — R53.81 DEBILITY: Primary | ICD-10-CM

## 2020-09-01 DIAGNOSIS — D63.8 ANEMIA OF CHRONIC DISEASE: ICD-10-CM

## 2020-09-01 DIAGNOSIS — I69.354 HEMIPARESIS AFFECTING LEFT SIDE AS LATE EFFECT OF CEREBROVASCULAR ACCIDENT (HCC): ICD-10-CM

## 2020-09-01 DIAGNOSIS — G47.33 OBSTRUCTIVE SLEEP APNEA: ICD-10-CM

## 2020-09-01 PROCEDURE — 99309 SBSQ NF CARE MODERATE MDM 30: CPT | Performed by: INTERNAL MEDICINE

## 2020-09-01 NOTE — PROGRESS NOTES
[address]  [address]  Facility: [facility]  Ryan Das [address]/        NAME: Cale Appiah  AGE: 78 y o  SEX: male    DATE OF ENCOUNTER: 9/1/2020    Code status:  CPR    Assessment and Plan     1  Debility  Assessment & Plan:  -secondary to his chronic medical conditions  -he still requires 24/7 care/support of his ADLs  -I recommend continued care/support of his ADLs at [facility] level of care  -continue with monitoring      2  Hypertension, unspecified type  Assessment & Plan:  -review of his BP/AP log looks well  -I recommend that he continue with hydralazine, amlodipine, labetalol, and lisinopril  -continue with clinical and periodic laboratory monitoring      3  Lower extremity edema  Assessment & Plan:  -his symptoms remain stable with furosemide and potassium  -he does not wish any change in his medications  -continue with clinical and periodic laboratory monitoring      4  Hemiparesis affecting left side as late effect of cerebrovascular accident [facility])  Assessment & Plan:  -symptoms remain stable  -he reports that the discomfort in his left arms is controled  -continue with secondary prevention with treatment of his hypertension  -continue with monitoring and care/support at long-term care [facility]      5  Type 2 diabetes mellitus with diabetic neuropathy, without long-term current use of insulin (HCC)  Assessment & Plan:    -review of his fingerstick fasting blood sugar log shows that he is doing well with TLC  -continue with TLC and periodic monitoring      6  Anemia of chronic disease  Assessment & Plan:  -he is asymptomatic and his laboratory values look well  -continue with clinical and periodic laboratory monitoring      7  Obstructive sleep apnea  Assessment & Plan:  -he reports being compliant on a nightly basis with the usage of his CPAP machine  -continue with monitoring      8   Hyperlipidemia, unspecified hyperlipidemia type  Assessment & Plan:  -review of his recent fasting lipid profile reveals that his atorvastatin dosage may be reduced, as his CVA was hemorrhagic and not thrombotic  -I will decrease his atorvastatin from 40 mg daily to 20 mg daily  -continue with clinical and periodic laboratory monitoring      9  Constipation, unspecified constipation type  Assessment & Plan:  -he reports that his symptoms are under adequate control with Senokot S  -to consider use of MiraLax, if needed  -continue with monitoring      10  History of hemorrhagic cerebrovascular accident (CVA) with residual deficit  Assessment & Plan:  -he remains with left-sided hemiparesis  -he has had no new symptoms with treatment of his hypertension  -he has a follow-up office visit scheduled with his neurologist on September 22, 2020  -continue with care/support of his ADLs at long-term care facility  -continue with secondary prevention  -continue with monitoring      11  Goals of care, counseling/discussion  Assessment & Plan:  -his current resuscitation status is CPR        All medications and routine orders were reviewed and updated as needed  Plan discussed with:  Resident and nursing staff  Chief Complaint     He is a 41-year-old gentleman who is seen for a follow-up visit to update the care and treatment of his debility secondary to his chronic medical conditions, hypertension, history of hemorrhagic CVA, and constipation      History of Present Illness     He is a 41-year-old gentleman who is seen with nursing staff for a follow-up visit to update the care and treatment of his debility secondary to his chronic medical conditions-he still requires 24/7 care/support of his ADLs, hypertension-doing well with hydralazine/amlodipine/labetalol/lisinopril, type 2 diabetes mellitus-doing well with TLC, history of hemorrhagic CVA with left hemiparesis-doing well with blood pressure control and care/support at long-term care facility, and constipation-doing well with Senokot S  He endorses feeling generally well today and not having any new complaints/concerns  He notes occasional constipation, but reports doing well with current supplement  He does not wish his medication regimen to be changed  The following portions of the patient's history were reviewed and updated as appropriate: current medications, past medical history, past social history, and problem list   He endorses using his CPAP machine on a nightly basis  Allergies:  No Known Allergies    Review of Systems     Review of Systems   Constitutional: Negative for appetite change  Respiratory: Negative for shortness of breath  Cardiovascular: Positive for leg swelling  Negative for chest pain and palpitations  Gastrointestinal: Positive for constipation  Negative for abdominal pain  Neurological:        Left arm/leg weakness  Medications and orders     All medications reviewed and updated in Nursing Home EMR  Objective     Vitals:  Weight 298 5 lb (stable), pulse 72, respiratory rate 12, blood pressure 142/72, temperature 98 8° F      Physical Exam  Vitals signs reviewed  Exam conducted with a chaperone present  Constitutional:       General: He is awake  He is not in acute distress  Appearance: Normal appearance  He is obese  He is not toxic-appearing or diaphoretic  Comments: He appears comfortable lying in bed, his stated age, and mildly frail  Eyes:      General: No scleral icterus  Conjunctiva/sclera: Conjunctivae normal    Cardiovascular:      Rate and Rhythm: Normal rate and regular rhythm  Heart sounds: No murmur  No friction rub  No gallop  Pulmonary:      Effort: Pulmonary effort is normal  No respiratory distress  Breath sounds: Normal breath sounds  No stridor  No wheezing, rhonchi or rales  Abdominal:      General: There is no distension  Palpations: Abdomen is soft  There is no mass  Tenderness: There is no abdominal tenderness  There is no guarding  Hernia: No hernia is present  Musculoskeletal:      Right lower leg: Edema present  Left lower leg: Edema present  Comments: 2+ bilateral pretibial pitting edema   Neurological:      Mental Status: He is alert  Psychiatric:         Mood and Affect: Mood normal          Behavior: Behavior normal  Behavior is cooperative  Pertinent Laboratory/Diagnostic Studies: The following labs were reviewed please see chart or hospital paperwork for details  April 7, 2020:    Fasting lipid profile: Total cholesterol 88, triglycerides 105, HDL 26, LDL 41    May 19, 2020:    CMP:  Sodium 142, potassium 4 5, BUN 18, creatinine 1 06, fasting blood sugar 96, EGFR 71, LFTs within normal limits    CBC with diff:  WBC count 6 3, hemoglobin 12, hematocrit 35 9, platelet count [id number]    - Monthly orders reviewed and signed  Treatment plan reviewed with nursing staff      ALMA Talavera   9/2/2020 7:11 AM

## 2020-09-02 PROBLEM — Z71.89 GOALS OF CARE, COUNSELING/DISCUSSION: Status: ACTIVE | Noted: 2020-09-02

## 2020-09-02 PROBLEM — R50.9 FEVER: Status: RESOLVED | Noted: 2020-05-19 | Resolved: 2020-09-02

## 2020-09-02 PROBLEM — I69.30 HISTORY OF HEMORRHAGIC CEREBROVASCULAR ACCIDENT (CVA) WITH RESIDUAL DEFICIT: Status: ACTIVE | Noted: 2019-06-18

## 2020-09-02 NOTE — ASSESSMENT & PLAN NOTE
-review of his fingerstick fasting blood sugar log shows that he is doing well with TLC  -continue with TLC and periodic monitoring

## 2020-09-02 NOTE — ASSESSMENT & PLAN NOTE
-review of his BP/AP log looks well  -I recommend that he continue with hydralazine, amlodipine, labetalol, and lisinopril  -continue with clinical and periodic laboratory monitoring

## 2020-09-02 NOTE — ASSESSMENT & PLAN NOTE
-symptoms remain stable  -he reports that the discomfort in his left arms is controled  -continue with secondary prevention with treatment of his hypertension  -continue with monitoring and care/support at long-term care facility

## 2020-09-02 NOTE — ASSESSMENT & PLAN NOTE
-he reports that his symptoms are under adequate control with Senokot S  -to consider use of MiraLax, if needed  -continue with monitoring

## 2020-09-02 NOTE — ASSESSMENT & PLAN NOTE
-review of his recent fasting lipid profile reveals that his atorvastatin dosage may be reduced, as his CVA was hemorrhagic and not thrombotic  -I will decrease his atorvastatin from 40 mg daily to 20 mg daily  -continue with clinical and periodic laboratory monitoring

## 2020-09-02 NOTE — ASSESSMENT & PLAN NOTE
-he reports being compliant on a nightly basis with the usage of his CPAP machine  -continue with monitoring

## 2020-09-02 NOTE — ASSESSMENT & PLAN NOTE
-his symptoms remain stable with furosemide and potassium  -he does not wish any change in his medications  -continue with clinical and periodic laboratory monitoring

## 2020-09-02 NOTE — ASSESSMENT & PLAN NOTE
-secondary to his chronic medical conditions  -he still requires 24/7 care/support of his ADLs  -I recommend continued care/support of his ADLs at long-term care facility level of care  -continue with monitoring

## 2020-09-02 NOTE — ASSESSMENT & PLAN NOTE
-he is asymptomatic and his laboratory values look well  -continue with clinical and periodic laboratory monitoring

## 2020-10-29 ENCOUNTER — NURSING HOME VISIT (OUTPATIENT)
Dept: GERIATRICS | Facility: OTHER | Age: 70
End: 2020-10-29
Payer: MEDICARE

## 2020-10-29 VITALS
TEMPERATURE: 97.8 F | BODY MASS INDEX: 44.75 KG/M2 | DIASTOLIC BLOOD PRESSURE: 62 MMHG | WEIGHT: 290 LBS | HEART RATE: 62 BPM | SYSTOLIC BLOOD PRESSURE: 128 MMHG

## 2020-10-29 DIAGNOSIS — G47.33 OBSTRUCTIVE SLEEP APNEA: ICD-10-CM

## 2020-10-29 DIAGNOSIS — K21.9 GASTROESOPHAGEAL REFLUX DISEASE, UNSPECIFIED WHETHER ESOPHAGITIS PRESENT: Primary | ICD-10-CM

## 2020-10-29 DIAGNOSIS — I10 HYPERTENSION, UNSPECIFIED TYPE: ICD-10-CM

## 2020-10-29 DIAGNOSIS — I69.354 HEMIPARESIS AFFECTING LEFT SIDE AS LATE EFFECT OF CEREBROVASCULAR ACCIDENT (HCC): ICD-10-CM

## 2020-10-29 DIAGNOSIS — E11.40 TYPE 2 DIABETES MELLITUS WITH DIABETIC NEUROPATHY, WITHOUT LONG-TERM CURRENT USE OF INSULIN (HCC): ICD-10-CM

## 2020-10-29 DIAGNOSIS — Z87.828 HISTORY OF BURNS: ICD-10-CM

## 2020-10-29 PROCEDURE — 99309 SBSQ NF CARE MODERATE MDM 30: CPT | Performed by: PHYSICIAN ASSISTANT

## 2020-12-14 ENCOUNTER — NURSING HOME VISIT (OUTPATIENT)
Dept: GERIATRICS | Facility: OTHER | Age: 70
End: 2020-12-14
Payer: MEDICARE

## 2020-12-14 VITALS
SYSTOLIC BLOOD PRESSURE: 135 MMHG | TEMPERATURE: 97.3 F | BODY MASS INDEX: 42.51 KG/M2 | HEART RATE: 56 BPM | WEIGHT: 275.5 LBS | DIASTOLIC BLOOD PRESSURE: 72 MMHG

## 2020-12-14 DIAGNOSIS — K02.9 DENTAL CARIES: Primary | ICD-10-CM

## 2020-12-14 PROCEDURE — 99308 SBSQ NF CARE LOW MDM 20: CPT | Performed by: PHYSICIAN ASSISTANT

## 2020-12-14 RX ORDER — HYDRALAZINE HYDROCHLORIDE 50 MG/1
1 TABLET, FILM COATED ORAL 4 TIMES DAILY
COMMUNITY

## 2020-12-14 RX ORDER — FAMOTIDINE 40 MG/5ML
20 POWDER, FOR SUSPENSION ORAL 2 TIMES DAILY
COMMUNITY
Start: 2017-07-06 | End: 2020-12-26 | Stop reason: ALTCHOICE

## 2020-12-14 RX ORDER — GABAPENTIN 300 MG/1
300 CAPSULE ORAL 3 TIMES DAILY
COMMUNITY
Start: 2013-12-23 | End: 2020-12-26 | Stop reason: ALTCHOICE

## 2020-12-14 RX ORDER — LABETALOL 100 MG/1
1 TABLET, FILM COATED ORAL EVERY 12 HOURS
COMMUNITY

## 2020-12-14 RX ORDER — FUROSEMIDE 20 MG/1
20 TABLET ORAL DAILY
COMMUNITY
End: 2020-12-26 | Stop reason: ALTCHOICE

## 2020-12-14 RX ORDER — LISINOPRIL 40 MG/1
1 TABLET ORAL DAILY
COMMUNITY

## 2020-12-14 RX ORDER — ATORVASTATIN CALCIUM 80 MG/1
20 TABLET, FILM COATED ORAL DAILY
COMMUNITY
Start: 2017-07-06 | End: 2020-12-26 | Stop reason: ALTCHOICE

## 2020-12-23 ENCOUNTER — NURSING HOME VISIT (OUTPATIENT)
Dept: GERIATRICS | Facility: OTHER | Age: 70
End: 2020-12-23
Payer: MEDICARE

## 2020-12-23 DIAGNOSIS — I69.354 HEMIPARESIS AFFECTING LEFT SIDE AS LATE EFFECT OF CEREBROVASCULAR ACCIDENT (HCC): ICD-10-CM

## 2020-12-23 DIAGNOSIS — R53.81 DEBILITY: Primary | ICD-10-CM

## 2020-12-23 DIAGNOSIS — K21.9 GASTROESOPHAGEAL REFLUX DISEASE, UNSPECIFIED WHETHER ESOPHAGITIS PRESENT: ICD-10-CM

## 2020-12-23 DIAGNOSIS — Z71.89 GOALS OF CARE, COUNSELING/DISCUSSION: ICD-10-CM

## 2020-12-23 DIAGNOSIS — G47.33 OBSTRUCTIVE SLEEP APNEA: ICD-10-CM

## 2020-12-23 DIAGNOSIS — E11.40 TYPE 2 DIABETES MELLITUS WITH DIABETIC NEUROPATHY, WITHOUT LONG-TERM CURRENT USE OF INSULIN (HCC): ICD-10-CM

## 2020-12-23 DIAGNOSIS — E78.5 HYPERLIPIDEMIA, UNSPECIFIED HYPERLIPIDEMIA TYPE: ICD-10-CM

## 2020-12-23 DIAGNOSIS — K02.9 DENTAL CARIES: ICD-10-CM

## 2020-12-23 DIAGNOSIS — I69.30 HISTORY OF HEMORRHAGIC CEREBROVASCULAR ACCIDENT (CVA) WITH RESIDUAL DEFICIT: ICD-10-CM

## 2020-12-23 DIAGNOSIS — I10 HYPERTENSION, UNSPECIFIED TYPE: ICD-10-CM

## 2020-12-23 PROCEDURE — 99309 SBSQ NF CARE MODERATE MDM 30: CPT | Performed by: INTERNAL MEDICINE

## 2020-12-26 PROBLEM — Z87.828 HISTORY OF BURNS: Status: RESOLVED | Noted: 2020-10-29 | Resolved: 2020-12-26

## 2020-12-26 PROBLEM — Z79.899 MEDICATION MANAGEMENT: Status: ACTIVE | Noted: 2020-12-26

## 2020-12-26 RX ORDER — GABAPENTIN 300 MG/1
1 CAPSULE ORAL
COMMUNITY
Start: 2021-04-14 | End: 2021-04-18

## 2020-12-26 RX ORDER — ATORVASTATIN CALCIUM 20 MG/1
1 TABLET, FILM COATED ORAL DAILY
COMMUNITY
End: 2021-08-11 | Stop reason: ALTCHOICE

## 2020-12-26 RX ORDER — ATORVASTATIN CALCIUM 40 MG/1
1 TABLET, FILM COATED ORAL DAILY
COMMUNITY
End: 2020-12-26 | Stop reason: ALTCHOICE

## 2020-12-26 RX ORDER — AMOXICILLIN 250 MG
2 CAPSULE ORAL
COMMUNITY

## 2020-12-26 RX ORDER — AMLODIPINE BESYLATE 10 MG/1
1 TABLET ORAL DAILY
COMMUNITY

## 2020-12-26 RX ORDER — FUROSEMIDE 40 MG/1
1 TABLET ORAL DAILY
COMMUNITY

## 2021-01-30 RX ORDER — LORATADINE 10 MG/1
10 TABLET ORAL DAILY
COMMUNITY
End: 2021-04-18 | Stop reason: ALTCHOICE

## 2021-01-30 RX ORDER — ACETAMINOPHEN 325 MG/1
650 TABLET ORAL EVERY 4 HOURS PRN
COMMUNITY

## 2021-01-30 RX ORDER — ACETAMINOPHEN 650 MG/1
650 SUPPOSITORY RECTAL EVERY 4 HOURS PRN
COMMUNITY

## 2021-01-30 RX ORDER — FAMOTIDINE 20 MG/1
20 TABLET, FILM COATED ORAL DAILY
COMMUNITY
End: 2021-04-18 | Stop reason: ALTCHOICE

## 2021-01-30 RX ORDER — MAGNESIUM HYDROXIDE 2400 MG/30ML
30 SUSPENSION ORAL DAILY PRN
COMMUNITY

## 2021-02-18 ENCOUNTER — NURSING HOME VISIT (OUTPATIENT)
Dept: GERIATRICS | Facility: OTHER | Age: 71
End: 2021-02-18
Payer: MEDICARE

## 2021-02-18 DIAGNOSIS — G47.33 OBSTRUCTIVE SLEEP APNEA: ICD-10-CM

## 2021-02-18 DIAGNOSIS — E11.40 TYPE 2 DIABETES MELLITUS WITH DIABETIC NEUROPATHY, WITHOUT LONG-TERM CURRENT USE OF INSULIN (HCC): Primary | ICD-10-CM

## 2021-02-18 DIAGNOSIS — I10 HYPERTENSION, UNSPECIFIED TYPE: ICD-10-CM

## 2021-02-18 DIAGNOSIS — I69.354 HEMIPARESIS AFFECTING LEFT SIDE AS LATE EFFECT OF CEREBROVASCULAR ACCIDENT (HCC): ICD-10-CM

## 2021-02-18 DIAGNOSIS — G62.9 NEUROPATHY: ICD-10-CM

## 2021-02-18 DIAGNOSIS — D63.8 ANEMIA OF CHRONIC DISEASE: ICD-10-CM

## 2021-02-18 DIAGNOSIS — E78.5 HYPERLIPIDEMIA, UNSPECIFIED HYPERLIPIDEMIA TYPE: ICD-10-CM

## 2021-02-18 PROCEDURE — 99309 SBSQ NF CARE MODERATE MDM 30: CPT | Performed by: PHYSICIAN ASSISTANT

## 2021-02-19 VITALS
WEIGHT: 295.6 LBS | BODY MASS INDEX: 45.61 KG/M2 | TEMPERATURE: 98.7 F | DIASTOLIC BLOOD PRESSURE: 54 MMHG | RESPIRATION RATE: 20 BRPM | SYSTOLIC BLOOD PRESSURE: 108 MMHG | HEART RATE: 54 BPM

## 2021-02-19 NOTE — PROGRESS NOTES
[address]  [phone] Tomi Snow [address]  Code  32      NAME: Maggie Pollock  AGE: 69 y o  SEX: male [id number]    DATE OF ENCOUNTER: 2/18/2021    CODE STATUS: CPR    Assessment and Plan     Problem List Items Addressed This Visit        Endocrine    Type 2 diabetes mellitus, without long-term current use of insulin ([facility] [address] ) - Primary       Lab Results   Component Value Date    HGBA1C 5 8 (H) 08/14/2019       HGB A1C below goal of 8  Not on any diabetic medications  Continue TLC            Respiratory    Obstructive sleep apnea     Compliant with CPAP            Cardiovascular and Mediastinum    Hypertension     BP log reviewed, stable  Continue current medication regimen            Nervous and Auditory    Hemiparesis affecting left side as late effect of cerebrovascular accident [facility])     Doing well in long term care facility         Neuropathy     Continue gabapentin  Continue PRN oxycodone            Other    Anemia of chronic disease     No recent labs, will order for next week         Hyperlipidemia     Continue statin               No orders of the defined types were placed in this encounter  Chief Complaint     Chief Complaint   Patient presents with    Geriatric Evaluation     follow up for chronic conditions       History of Present Illness   69year old male resident of [address] [place] [address] being seen today for follow up for chronic conditions  Patient is feeling well, he offers no complaints  Nursing has no concerns  The following portions of the patient's history were reviewed and updated as appropriate: allergies, current medications, past family history, past medical history, past social history, past surgical history and problem list     Review of Systems   Review of Systems   Constitutional: Negative  HENT: Negative  Eyes: Negative  Respiratory: Negative  Cardiovascular: Negative  Gastrointestinal: Negative  Endocrine: Negative  Genitourinary: Negative  Musculoskeletal: Positive for gait problem and myalgias  Allergic/Immunologic: Negative  Neurological: Positive for weakness  Hematological: Negative  Psychiatric/Behavioral: Negative  Active Problem List     Patient Active Problem List   Diagnosis    Dental caries    Debility    Hypertension    History of hemorrhagic cerebrovascular accident (CVA) with residual deficit    Lower extremity edema    Dyspepsia    Hemiparesis affecting left side as late effect of cerebrovascular accident ([facility] [address] )    Type 2 diabetes mellitus, without long-term current use of insulin (HCC)    Anemia of chronic disease    Obstructive sleep apnea    Hyperlipidemia    Constipation    GERD (gastroesophageal reflux disease)    Neuropathy    Obesity    POLST (Physician Orders for Life-Sustaining Treatment)    Goals of care, counseling/discussion    Medication management         Objective     /54   Pulse (!) 54   Temp 98 7 °F (37 1 °C)   Resp 20   Wt 134 kg (295 lb 9 6 oz)   BMI 45 61 kg/m²     Physical Exam  Vitals signs reviewed  Constitutional:       Appearance: He is obese  He is not ill-appearing or diaphoretic  HENT:      Head: Normocephalic  Mouth/Throat:      Mouth: Mucous membranes are moist       Pharynx: No oropharyngeal exudate or posterior oropharyngeal erythema  Eyes:      General: No scleral icterus  Conjunctiva/sclera: Conjunctivae normal       Pupils: Pupils are equal, round, and reactive to light  Neck:      Musculoskeletal: Normal range of motion and neck supple  No muscular tenderness  Cardiovascular:      Rate and Rhythm: Regular rhythm  Bradycardia present  Pulmonary:      Effort: Pulmonary effort is normal  No respiratory distress  Breath sounds: Normal breath sounds  Abdominal:      General: Abdomen is flat  Bowel sounds are normal  There is no distension  Palpations: Abdomen is soft  Tenderness: There is no abdominal tenderness  Musculoskeletal:         General: No deformity or signs of injury  Right lower leg: Edema present  Left lower leg: Edema present  Lymphadenopathy:      Cervical: No cervical adenopathy  Skin:     General: Skin is warm and dry  Coloration: Skin is not jaundiced  Findings: No rash  Neurological:      Mental Status: He is alert and oriented to person, place, and time  Mental status is at baseline  Comments: Weakness from prior stroke  Psychiatric:         Mood and Affect: Mood normal          Behavior: Behavior normal          Pertinent Laboratory/Diagnostic Studies:    Labs pending    Current Medications   Medications reviewed and updated in facility chart

## 2021-02-19 NOTE — ASSESSMENT & PLAN NOTE
Lab Results   Component Value Date    HGBA1C 5 8 (H) 08/14/2019       HGB A1C below goal of 8  Not on any diabetic medications  Continue TLC

## 2021-04-14 ENCOUNTER — NURSING HOME VISIT (OUTPATIENT)
Dept: GERIATRICS | Facility: OTHER | Age: 71
End: 2021-04-14
Payer: MEDICARE

## 2021-04-14 DIAGNOSIS — I69.30 HISTORY OF HEMORRHAGIC CEREBROVASCULAR ACCIDENT (CVA) WITH RESIDUAL DEFICIT: ICD-10-CM

## 2021-04-14 DIAGNOSIS — R60.0 LOWER EXTREMITY EDEMA: ICD-10-CM

## 2021-04-14 DIAGNOSIS — G62.9 NEUROPATHY: ICD-10-CM

## 2021-04-14 DIAGNOSIS — G47.33 OBSTRUCTIVE SLEEP APNEA: ICD-10-CM

## 2021-04-14 DIAGNOSIS — R53.81 DEBILITY: Primary | ICD-10-CM

## 2021-04-14 DIAGNOSIS — Z71.89 GOALS OF CARE, COUNSELING/DISCUSSION: ICD-10-CM

## 2021-04-14 DIAGNOSIS — E11.40 TYPE 2 DIABETES MELLITUS WITH DIABETIC NEUROPATHY, WITHOUT LONG-TERM CURRENT USE OF INSULIN (HCC): ICD-10-CM

## 2021-04-14 DIAGNOSIS — K59.00 CONSTIPATION, UNSPECIFIED CONSTIPATION TYPE: ICD-10-CM

## 2021-04-14 DIAGNOSIS — Z87.19 HISTORY OF GASTROESOPHAGEAL REFLUX (GERD): ICD-10-CM

## 2021-04-14 DIAGNOSIS — I10 HYPERTENSION, UNSPECIFIED TYPE: ICD-10-CM

## 2021-04-14 DIAGNOSIS — E78.5 HYPERLIPIDEMIA, UNSPECIFIED HYPERLIPIDEMIA TYPE: ICD-10-CM

## 2021-04-14 DIAGNOSIS — I69.354 HEMIPARESIS AFFECTING LEFT SIDE AS LATE EFFECT OF CEREBROVASCULAR ACCIDENT (HCC): ICD-10-CM

## 2021-04-14 PROCEDURE — 99309 SBSQ NF CARE MODERATE MDM 30: CPT | Performed by: INTERNAL MEDICINE

## 2021-04-14 RX ORDER — PREGABALIN 50 MG/1
50 CAPSULE ORAL
Qty: 30 CAPSULE | Refills: 2 | Status: SHIPPED | OUTPATIENT
Start: 2021-04-19 | End: 2021-08-11 | Stop reason: ALTCHOICE

## 2021-04-14 NOTE — PROGRESS NOTES
[facility]  [address] [place] [address]  Facility: [facility]  32    NAME: Harlan Ventura  AGE: 69 y o  SEX: male    DATE OF ENCOUNTER: 4/14/2021    Code status:  CPR    Assessment and Plan     1  Debility  Assessment & Plan:  · Secondary to his chronic medical conditions   · He continues to require 24/7 care/support of his ADLs   · I recommend continued care/support of his ADLs as a long-term resident at the nursing facility  · Will continue to monitor for change in his condition      2  Neuropathy  Assessment & Plan:  · Will transition from gabapentin to pregabalin to see if any benefit with reduction of edema  · Will continue with monitoring for change in condition    Orders:  -     pregabalin (LYRICA) 50 mg capsule; Take 1 capsule (50 mg total) by mouth daily at bedtime    3  Constipation, unspecified constipation type  Assessment & Plan:  · Recently symptomatic secondary to using oxycodone for his skin graft site pain   · He reports discontinuing the use of his oxycodone   · He wishes to continue with Senokot S/MiraLax and observe for results   · Will continue with monitoring for change in condition      4  Hypertension, unspecified type  Assessment & Plan:  · Review of his BP and AP log shows that he is doing well with hydralazine, amlodipine, labetalol, and lisinopril  · I will continue him on this medication regimen  · Will continue with clinical and periodic laboratory monitoring for change in condition      5   History of hemorrhagic cerebrovascular accident (CVA) with residual deficit  Assessment & Plan:  · He is doing well care/support of his ADLs as a resident at the long-term care facility  · He is doing well with secondary prevention of BP management and atorvastatin  · He is following with his neurologist for care  · Will continue his care in collaboration with his neurology service   · Will continue with clinical and periodic laboratory monitoring for change in condition      6  Hemiparesis affecting left side as late effect of cerebrovascular accident [facility])  Assessment & Plan:  · He is doing well with secondary stroke prevention   · He continues with care/support of his ADLs as a resident at the long-term care facility  · Will continue to monitor for change in his condition      7  Type 2 diabetes mellitus with diabetic neuropathy, without long-term current use of insulin (HCC)  Assessment & Plan:    · Review of his fingerstick blood sugar log shows that he is doing well with TLC  · Will continue with clinical and periodic laboratory monitoring for change in condition      8  History of gastroesophageal reflux (GERD)  Assessment & Plan:  · He endorses not having symptoms without famotidine  · Will continue to monitor for change in condition      9  Hyperlipidemia, unspecified hyperlipidemia type  Assessment & Plan:  · He is doing well with atorvastatin   · I will continue him on this medication  · Will continue with periodic laboratory monitoring for change in condition      10  Lower extremity edema  Assessment & Plan:  · His symptoms are stable with furosemide   · I will continue him on this medication   · Will taper him off gabapentin and transition to pregabalin to see if this aids in his edema  · Amlodipine may also be contributing towards his edema, but BP management is essential in preventing further hemorrhagic CVA  · Will continue with clinical and periodic laboratory monitoring for change in condition      11  Obstructive sleep apnea  Assessment & Plan:  · He continues with regular use of his CPAP machine   · Will continue to monitor for change in condition      12  Goals of care, counseling/discussion  Assessment & Plan:  · His resuscitation status is "CPR "        All medications and routine orders were reviewed and updated as needed  Plan discussed with: Resident and nursing staff      I ordered a hemoglobin A1c, liver function panel, and fasting lipid profile to be performed to assist in the management of his medical conditions  Chief Complaint     He is seen for a follow-up visit to update the care and treatment of his debility secondary to his chronic medical conditions, history of GERD, history of hemorrhagic CVA with left-sided hemiparesis, hypertension, obstructive sleep apnea, and type 2 diabetes mellitus with neuropathy  History of Present Illness     He is a pleasant 69-year-old gentleman who is seen for a follow-up visit to update the care and treatment of his debility secondary to his chronic medical conditions-he continues to require 24/7 care/ support of his ADLs, history GERD-doing well and asymptomatic since famotidine was discontinued, history of hemorrhagic CVA with left-sided hemiparesis-doing well with support of his ADLs at long-term care facility and BP management, obstructive sleep apnea -using his CPAP machine routinely, and type 2 diabetes mellitus with neuropathy-doing well with TLC  He denies chest pain and shortness of breath  He notes that he can have a burning discomfort at the site of his skin graft  He denies neuropathic complaints in his lower legs and feet  He reports experiencing constipation  He is aware that constipation is a side effect of oxycodone  He endorses that oxycodone does help relieve the pain at the site of his skin graft  He reports stopping the use of the oxycodone  He wishes to give MiraLax a chance to work  The following portions of the patient's history were reviewed and updated as appropriate: current medications, past medical history, past social history, and problem list     Allergies:  No Known Allergies    Review of Systems     Review of Systems   Respiratory: Negative for shortness of breath  Cardiovascular: Negative for chest pain  Gastrointestinal: Positive for constipation (  See HPI)  Negative for abdominal pain     Neurological:        See HPI Medications and orders     All medications reviewed and updated in Nursing Home EMR  Objective     Vitals:  Monthly vitals:  Weight 301 5 lb, pulse 78, blood pressure 132/76, fasting fingerstick blood sugar 120  Review of his weight, BP, AP, and blood sugar logs looks well  Physical Exam  Vitals signs reviewed  Constitutional:       General: He is awake  He is not in acute distress  Appearance: He is well-developed  He is not ill-appearing, toxic-appearing or diaphoretic  Comments: He appears comfortable lying in bed, his stated age, and frail  HENT:      Head: Normocephalic  Eyes:      General: No scleral icterus  Conjunctiva/sclera: Conjunctivae normal    Cardiovascular:      Rate and Rhythm: Normal rate and regular rhythm  Heart sounds: Normal heart sounds  No murmur  No friction rub  No gallop  Comments: There is 2+ bilateral lower extremity edema  Pulmonary:      Effort: Pulmonary effort is normal  No respiratory distress  Breath sounds: Normal breath sounds  No wheezing, rhonchi or rales  Abdominal:      General: Abdomen is flat  There is no distension  Palpations: Abdomen is soft  There is no mass  Tenderness: There is no abdominal tenderness  There is no guarding or rebound  Neurological:      Mental Status: He is alert  Psychiatric:         Mood and Affect: Mood normal          Behavior: Behavior normal  Behavior is cooperative  Pertinent Laboratory/Diagnostic Studies: The following labs were reviewed please see chart or hospital paperwork for details  February 23, 2021:    CBC with diff:  WBC count 6 2, hemoglobin 12 4, hematocrit 37 3, platelet count [id number]     BMP: Sodium 142, potassium 3 9, BUN 16, creatinine 0 84, fasting blood sugar 91, EGFR 88    - His monthly order summary was reviewed and signed  Treatment plan reviewed with resident and nursing staff      ALMA Sousa   4/18/2021 11:07 PM

## 2021-04-18 PROBLEM — Z87.19 HISTORY OF GASTROESOPHAGEAL REFLUX (GERD): Status: ACTIVE | Noted: 2019-08-07

## 2021-04-18 PROBLEM — K02.9 DENTAL CARIES: Status: RESOLVED | Noted: 2019-06-11 | Resolved: 2021-04-18

## 2021-04-18 PROBLEM — R10.13 DYSPEPSIA: Status: RESOLVED | Noted: 2019-06-18 | Resolved: 2021-04-18

## 2021-04-18 RX ORDER — POTASSIUM CHLORIDE 20 MEQ/1
2 TABLET, EXTENDED RELEASE ORAL 2 TIMES DAILY
COMMUNITY

## 2021-04-18 RX ORDER — POLYETHYLENE GLYCOL 3350 17 G/17G
17 POWDER, FOR SOLUTION ORAL DAILY
COMMUNITY

## 2021-04-19 NOTE — ASSESSMENT & PLAN NOTE
· Secondary to his chronic medical conditions   · He continues to require 24/7 care/support of his ADLs   · I recommend continued care/support of his ADLs as a long-term resident at the nursing facility  · Will continue to monitor for change in his condition

## 2021-04-19 NOTE — ASSESSMENT & PLAN NOTE
· He is doing well with secondary stroke prevention   · He continues with care/support of his ADLs as a resident at the long-term care facility  · Will continue to monitor for change in his condition

## 2021-04-19 NOTE — ASSESSMENT & PLAN NOTE
· He continues with regular use of his CPAP machine   · Will continue to monitor for change in condition

## 2021-04-19 NOTE — ASSESSMENT & PLAN NOTE
· Review of his BP and AP log shows that he is doing well with hydralazine, amlodipine, labetalol, and lisinopril  · I will continue him on this medication regimen  · Will continue with clinical and periodic laboratory monitoring for change in condition

## 2021-04-19 NOTE — ASSESSMENT & PLAN NOTE
· He endorses not having symptoms without famotidine  · Will continue to monitor for change in condition

## 2021-04-19 NOTE — ASSESSMENT & PLAN NOTE
· Recently symptomatic secondary to using oxycodone for his skin graft site pain   · He reports discontinuing the use of his oxycodone   · He wishes to continue with Senokot S/MiraLax and observe for results   · Will continue with monitoring for change in condition

## 2021-04-19 NOTE — ASSESSMENT & PLAN NOTE
· His symptoms are stable with furosemide   · I will continue him on this medication   · Will taper him off gabapentin and transition to pregabalin to see if this aids in his edema  · Amlodipine may also be contributing towards his edema, but BP management is essential in preventing further hemorrhagic CVA  · Will continue with clinical and periodic laboratory monitoring for change in condition

## 2021-04-19 NOTE — ASSESSMENT & PLAN NOTE
· He is doing well with atorvastatin   · I will continue him on this medication  · Will continue with periodic laboratory monitoring for change in condition

## 2021-04-19 NOTE — ASSESSMENT & PLAN NOTE
· He is doing well care/support of his ADLs as a resident at the long-term care facility  · He is doing well with secondary prevention of BP management and atorvastatin  · He is following with his neurologist for care  · Will continue his care in collaboration with his neurology service   · Will continue with clinical and periodic laboratory monitoring for change in condition

## 2021-04-19 NOTE — ASSESSMENT & PLAN NOTE
· Will transition from gabapentin to pregabalin to see if any benefit with reduction of edema  · Will continue with monitoring for change in condition

## 2021-04-19 NOTE — ASSESSMENT & PLAN NOTE
· Review of his fingerstick blood sugar log shows that he is doing well with TLC  · Will continue with clinical and periodic laboratory monitoring for change in condition

## 2021-06-09 ENCOUNTER — NURSING HOME VISIT (OUTPATIENT)
Dept: GERIATRICS | Age: 71
End: 2021-06-09
Payer: COMMERCIAL

## 2021-06-09 VITALS
RESPIRATION RATE: 20 BRPM | HEART RATE: 56 BPM | DIASTOLIC BLOOD PRESSURE: 60 MMHG | WEIGHT: 287 LBS | TEMPERATURE: 98.5 F | BODY MASS INDEX: 44.29 KG/M2 | SYSTOLIC BLOOD PRESSURE: 112 MMHG

## 2021-06-09 DIAGNOSIS — R60.0 LOWER EXTREMITY EDEMA: ICD-10-CM

## 2021-06-09 DIAGNOSIS — G62.9 NEUROPATHY: ICD-10-CM

## 2021-06-09 DIAGNOSIS — G47.33 OBSTRUCTIVE SLEEP APNEA: ICD-10-CM

## 2021-06-09 DIAGNOSIS — I69.354 HEMIPARESIS AFFECTING LEFT SIDE AS LATE EFFECT OF CEREBROVASCULAR ACCIDENT (HCC): ICD-10-CM

## 2021-06-09 DIAGNOSIS — R53.81 DEBILITY: ICD-10-CM

## 2021-06-09 DIAGNOSIS — E11.40 TYPE 2 DIABETES MELLITUS WITH DIABETIC NEUROPATHY, WITHOUT LONG-TERM CURRENT USE OF INSULIN (HCC): ICD-10-CM

## 2021-06-09 DIAGNOSIS — K59.00 CONSTIPATION, UNSPECIFIED CONSTIPATION TYPE: Primary | ICD-10-CM

## 2021-06-09 DIAGNOSIS — I10 HYPERTENSION, UNSPECIFIED TYPE: ICD-10-CM

## 2021-06-09 PROCEDURE — 99309 SBSQ NF CARE MODERATE MDM 30: CPT | Performed by: NURSE PRACTITIONER

## 2021-06-09 NOTE — ASSESSMENT & PLAN NOTE
· Doing well  · Continue with hydralazine, amlodipine, labetalol, lisinipril, furosemide  · Continue to monitor BP  · Monitor BMP periodically for renal function

## 2021-06-09 NOTE — ASSESSMENT & PLAN NOTE
· At baseline  · Continue with restorative care  · Assist with ADL and IADL  · Continue supportive care  · Monitor for any changes

## 2021-06-09 NOTE — ASSESSMENT & PLAN NOTE
· Trace edema bilaterally  · Continue with furosemide   · Elevate legs when in bed  · Monitor for increased edema

## 2021-06-09 NOTE — PROGRESS NOTES
[facility]  [address]  [phone]  Carnella Abts  LTCF PRogress Note        NAME: Himanshu Funez  AGE: 69 y o  SEX: male    DATE OF ENCOUNTER: 6/9/2021    Assessment and Plan     1  Constipation, unspecified constipation type  Assessment & Plan:  · Controlled  · Continue with bowel program as needed  · Monitor for loose stool      2  Debility  Assessment & Plan:  · Doing well at long-term care facility  · Continue with restorative care  · Fall precautions  · Assist with ADL and IADLs      3  Hemiparesis affecting left side as late effect of cerebrovascular accident [facility])  Assessment & Plan:  · At baseline  · Continue with restorative care  · Assist with ADL and IADL  · Continue supportive care  · Monitor for any changes      4  Hypertension, unspecified type  Assessment & Plan:  · Doing well  · Continue with hydralazine, amlodipine, labetalol, lisinipril, furosemide  · Continue to monitor BP  · Monitor BMP periodically for renal function      5  Lower extremity edema  Assessment & Plan:  · Trace edema bilaterally  · Continue with furosemide   · Elevate legs when in bed  · Monitor for increased edema      6  Neuropathy  Assessment & Plan:  · Controlled  · contineuw ith oxycodone for pain management  · Monitor pain levels      7  Obstructive sleep apnea  Assessment & Plan:  · At baseline  · CPAP during sleep  · Monitor for any changes      8  Type 2 diabetes mellitus with diabetic neuropathy, without long-term current use of insulin ([facility] )  Assessment & Plan:  · Well controlled  · continue with pregabalin  · Continue to monitor BS daily          No orders of the defined types were placed in this encounter        History of Present Illness     Himanshu Funez 69 y o  male seen for follow-up of care and treatment plan for ambulatory dysfunction doing well at LTCF, constipation controlled with bowel program, hemiparesis at baseline, HTN doing well with BP meds, lower extremity edema controlled with furosemide, neuropathy pain controlled, GERARD doing well with CPAP, DM2 controlled with oral meds  The following portions of the patient's history were reviewed and updated as appropriate: allergies, current medications, past family history, past medical history, past social history, past surgical history and problem list     Review of Systems     Review of Systems   Constitutional: Negative for chills and fever  HENT: Negative for ear pain and sore throat  Eyes: Negative for pain and visual disturbance  Respiratory: Negative for cough, shortness of breath and wheezing  Cardiovascular: Positive for leg swelling (trace)  Negative for chest pain and palpitations  Gastrointestinal: Positive for constipation (at times)  Negative for abdominal pain and vomiting  Genitourinary: Negative for dysuria and hematuria  Musculoskeletal: Positive for gait problem  Negative for arthralgias and back pain  Skin: Negative for color change and rash  Neurological: Positive for weakness and numbness (left sided)  Negative for seizures and syncope  All other systems reviewed and are negative        Active Problem List     Patient Active Problem List   Diagnosis    Debility    Hypertension    History of hemorrhagic cerebrovascular accident (CVA) with residual deficit    Lower extremity edema    Hemiparesis affecting left side as late effect of cerebrovascular accident ([facility] [address] )    Type 2 diabetes mellitus, without long-term current use of insulin ([facility])    Anemia of chronic disease    Obstructive sleep apnea    Hyperlipidemia    Constipation    History of gastroesophageal reflux (GERD)    Neuropathy    Obesity    POLST (Physician Orders for Life-Sustaining Treatment)    Goals of care, counseling/discussion    Medication management       Objective     /60   Pulse 56   Temp 98 5 °F (36 9 °C)   Resp 20   Wt 130 kg (287 lb)   BMI 44 29 kg/m²     Physical Exam  Vitals signs reviewed  Constitutional:       Appearance: He is well-developed  HENT:      Head: Normocephalic and atraumatic  Eyes:      Conjunctiva/sclera: Conjunctivae normal    Neck:      Musculoskeletal: Normal range of motion  Cardiovascular:      Rate and Rhythm: Normal rate and regular rhythm  Heart sounds: Normal heart sounds, S1 normal and S2 normal  No murmur  Pulmonary:      Effort: Pulmonary effort is normal  No respiratory distress  Breath sounds: Normal breath sounds  No wheezing  Abdominal:      General: Bowel sounds are normal  There is no distension  Palpations: Abdomen is soft  Tenderness: There is no abdominal tenderness  Musculoskeletal: Normal range of motion  Right lower leg: Edema present  Left lower leg: Edema present  Comments: Generalized weakness, left sided hemeparesis   Skin:     General: Skin is warm and dry  Neurological:      Mental Status: He is alert  Psychiatric:         Attention and Perception: Attention normal          Mood and Affect: Mood normal          Speech: Speech normal          Behavior: Behavior normal          Thought Content: Thought content normal          Cognition and Memory: Cognition normal          Pertinent Laboratory/Diagnostic Studies:  [facility] Labs Reviewed    Current Medications   Medication list reviewed and updated today  Please see paper chart for updated medication list      Esha Cuellar  :06 PM      Name: Jose Jay  : 1950  MRN: [id number]  DOS: 2021    Diagnoses:   Diagnosis ICD-10-CM Associated Orders   1  Constipation, unspecified constipation type  K59 00    2  Debility  R53 81    3  Hemiparesis affecting left side as late effect of [facility]vascular [facility]  I69 354    4  Hypertension, unspecified type  I10    5  Lower extremity edema  R60 0    6  Neuropathy  G62 9    7  Obstructive sleep apnea  G47 33    8   Type 2 diabetes mellitus with diabetic neuropathy, without long-term current use of insulin (HCC)  E11 40

## 2021-06-09 NOTE — ASSESSMENT & PLAN NOTE
Please schedule labs only in 2 weeks CBC, CMP and same labs, NP appt and cytoxan (as previously scheduled) in 4 weeks. · Controlled  · Continue with bowel program as needed  · Monitor for loose stool

## 2021-06-09 NOTE — ASSESSMENT & PLAN NOTE
· Doing well at long-term care facility  · Continue with restorative care  · Fall precautions  · Assist with ADL and IADLs

## 2021-08-11 ENCOUNTER — NURSING HOME VISIT (OUTPATIENT)
Dept: GERIATRICS | Facility: OTHER | Age: 71
End: 2021-08-11
Payer: COMMERCIAL

## 2021-08-11 DIAGNOSIS — E78.5 HYPERLIPIDEMIA, UNSPECIFIED HYPERLIPIDEMIA TYPE: Primary | ICD-10-CM

## 2021-08-11 DIAGNOSIS — I10 HYPERTENSION, UNSPECIFIED TYPE: ICD-10-CM

## 2021-08-11 DIAGNOSIS — Z71.89 GOALS OF CARE, COUNSELING/DISCUSSION: ICD-10-CM

## 2021-08-11 DIAGNOSIS — I69.30 HISTORY OF HEMORRHAGIC CEREBROVASCULAR ACCIDENT (CVA) WITH RESIDUAL DEFICIT: ICD-10-CM

## 2021-08-11 DIAGNOSIS — G47.33 OBSTRUCTIVE SLEEP APNEA: ICD-10-CM

## 2021-08-11 DIAGNOSIS — E11.40 TYPE 2 DIABETES MELLITUS WITH DIABETIC NEUROPATHY, WITHOUT LONG-TERM CURRENT USE OF INSULIN (HCC): ICD-10-CM

## 2021-08-11 DIAGNOSIS — K59.00 CONSTIPATION, UNSPECIFIED CONSTIPATION TYPE: ICD-10-CM

## 2021-08-11 DIAGNOSIS — R53.81 DEBILITY: ICD-10-CM

## 2021-08-11 PROCEDURE — 99309 SBSQ NF CARE MODERATE MDM 30: CPT | Performed by: INTERNAL MEDICINE

## 2021-08-11 NOTE — PROGRESS NOTES
R[address]  [address]  Facility: [facility]  32    NAME: Dion Trinidad  AGE: 69 y o  SEX: male    DATE OF ENCOUNTER: 8/11/2021    Code status:  CPR    Assessment and Plan     1  Hyperlipidemia, unspecified hyperlipidemia type  Assessment & Plan:  · April 21, 2021: Total cholesterol 88, triglycerides 100, HDL 41, LDL 27, LFTs within normal limits except albumin of 3  · I will lower the dosage of his atorvastatin from 20 mg daily to 10 mg daily secondary to his total cholesterol being less than 100   · Will continue with periodic laboratory monitoring for change in his condition      2  Debility  Assessment & Plan:  · Secondary to his chronic medical conditions   · He continues to require 24/7 care/support of his ADLs   · I recommend continued care/support of his ADLs as a long-term resident at the nursing facility   · Will continue to monitor for change in his condition      3  Constipation, unspecified constipation type  Assessment & Plan:  · He is doing well with routine MiraLax and Senokot S   · Will continue him on this medication regimen   · Will continue with monitoring for change in his condition      4  Hypertension, unspecified type  Assessment & Plan:  · Review of his BP and AP logs looks well with amlodipine, hydralazine, labetalol, and lisinopril   · Will continue him on this medication regimen  · Will continue with clinical and periodic laboratory monitoring for change in his condition      5   Type 2 diabetes mellitus with diabetic neuropathy, without long-term current use of insulin [facility])  Assessment & Plan:    · April 21, 2021: Hemoglobin A1c:  5 9%   · He continues to do well without medication  · For treatment of his diabetic neuropathy, I will increase his pregabalin from 50 mg daily before bedtime to 50 mg every 12 hours and will continue with titration as indicated  · Will continue with clinical and periodic laboratory monitoring for change in his condition      6  History of hemorrhagic cerebrovascular accident (CVA) with residual deficit  Assessment & Plan:  · He is doing well with care/support of his ADLs at the nursing facility  · He is doing well with secondary stroke prevention of atorvastatin and blood pressure control   · Will continue with clinical and periodic laboratory monitoring for change in his condition      7  Obstructive sleep apnea  Assessment & Plan:  · He will continue with routine use of his CPAP machine while sleeping   · Will continue with monitoring for change in his condition      8  Goals of care, counseling/discussion  Assessment & Plan:  ·  His resuscitation status is "CPR"      See my note of April 14, 2021 for further assessment and plan  All medications and routine orders were reviewed and updated as needed  Plan discussed with:  Resident and nursing staff  Chief Complaint     He is seen for a follow-up visit to update the care and treatment of his hyperlipidemia, debility secondary to his chronic medical conditions, constipation, and hypertension, and diabetic neuropathy  History of Present Illness     He is a 65-year-old gentleman who is seen for a follow-up visit to update the care and treatment of his hyperlipidemia -doing well with atorvastatin, debility secondary to his chronic medical conditions-he continues to require 24/7 care/support of his ADLs, constipation- doing well with routine MiraLax and Senokot S, hypertension-doing well with amlodipine, hydralazine, labetalol, and lisinopril, and diabetic neuropathy-continues with shooting neuropathic type discomfort in his left leg  He reports that his appetite is good, but that he does not like the food at the nursing facility  Nursing staff endorses that he complete % of his meals  He denies chest pain and shortness of breath  He reports using his CPAP machine on a routine basis      The following portions of the patient's history were reviewed and updated as appropriate: current medications, past medical history, past social history, and problem list     Allergies:  No Known Allergies    Review of Systems     Review of Systems   Constitutional: Negative for appetite change  Respiratory: Negative for shortness of breath  Cardiovascular: Negative for chest pain  Neurological:        See HPI       Medications and orders     All medications reviewed and updated in Nursing Home EMR  Objective     Vitals:   Monthly vitals:  Weight 300 lb (stable), pulse 57, blood pressure 128/57, fasting fingerstick blood sugar 112  Review of his BP and AP logs looks well  Physical Exam  Vitals reviewed  Constitutional:       General: He is awake  He is not in acute distress  Appearance: He is well-developed  He is obese  He is not toxic-appearing or diaphoretic  Comments: He appears comfortable lying in bed, his stated age, and frail  Cardiovascular:      Rate and Rhythm: Normal rate and regular rhythm  Heart sounds: Normal heart sounds  No murmur heard  No friction rub  No gallop  Comments: There is edema of bilateral lower extremities to at least his knees  Pulmonary:      Effort: Pulmonary effort is normal  No respiratory distress  Breath sounds: Normal breath sounds  No stridor  No wheezing, rhonchi or rales  Abdominal:      General: Abdomen is flat  There is no distension  Palpations: Abdomen is soft  There is no mass  Tenderness: There is no abdominal tenderness  There is no guarding or rebound  Neurological:      Mental Status: He is alert  Psychiatric:         Mood and Affect: Mood normal          Behavior: Behavior normal  Behavior is cooperative  Pertinent Laboratory/Diagnostic Studies: The following labs were reviewed please see chart or hospital paperwork for details      April 21, 2021:     LFTs:  Within normal limits except albumin of 3     Fasting lipid profile: Total cholesterol 88, triglycerides 100, HDL 41, LDL 27     Hemoglobin A1c 5 9%    - His monthly order summary was reviewed and signed  Treatment plan reviewed with nursing staff      ALMA Beckham   8/22/2021 10:22 PM

## 2021-08-22 RX ORDER — PREGABALIN 50 MG/1
50 CAPSULE ORAL EVERY 12 HOURS SCHEDULED
COMMUNITY
Start: 2021-08-11 | End: 2021-12-14 | Stop reason: ALTCHOICE

## 2021-08-22 RX ORDER — ATORVASTATIN CALCIUM 10 MG/1
1 TABLET, FILM COATED ORAL DAILY
COMMUNITY
Start: 2021-08-12

## 2021-08-22 RX ORDER — OXYCODONE HYDROCHLORIDE 5 MG/1
5 TABLET ORAL EVERY 8 HOURS PRN
COMMUNITY
Start: 2020-10-29 | End: 2021-09-20 | Stop reason: SDUPTHER

## 2021-08-23 NOTE — ASSESSMENT & PLAN NOTE
· April 21, 2021:  Total cholesterol 88, triglycerides 100, HDL 41, LDL 27, LFTs within normal limits except albumin of 3  · I will lower the dosage of his atorvastatin from 20 mg daily to 10 mg daily secondary to his total cholesterol being less than 100   · Will continue with periodic laboratory monitoring for change in his condition

## 2021-08-23 NOTE — ASSESSMENT & PLAN NOTE
· Review of his BP and AP logs looks well with amlodipine, hydralazine, labetalol, and lisinopril   · Will continue him on this medication regimen  · Will continue with clinical and periodic laboratory monitoring for change in his condition

## 2021-08-23 NOTE — ASSESSMENT & PLAN NOTE
· He is doing well with care/support of his ADLs at the nursing facility  · He is doing well with secondary stroke prevention of atorvastatin and blood pressure control   · Will continue with clinical and periodic laboratory monitoring for change in his condition

## 2021-08-23 NOTE — ASSESSMENT & PLAN NOTE
· He is doing well with routine MiraLax and Senokot S   · Will continue him on this medication regimen   · Will continue with monitoring for change in his condition

## 2021-08-23 NOTE — ASSESSMENT & PLAN NOTE
· He will continue with routine use of his CPAP machine while sleeping   · Will continue with monitoring for change in his condition

## 2021-08-23 NOTE — ASSESSMENT & PLAN NOTE
· April 21, 2021: Hemoglobin A1c:  5 9%   · He continues to do well without medication  · For treatment of his diabetic neuropathy, I will increase his pregabalin from 50 mg daily before bedtime to 50 mg every 12 hours and will continue with titration as indicated  · Will continue with clinical and periodic laboratory monitoring for change in his condition

## 2021-09-20 DIAGNOSIS — G62.9 NEUROPATHY: Primary | ICD-10-CM

## 2021-09-20 RX ORDER — OXYCODONE HYDROCHLORIDE 5 MG/1
5 TABLET ORAL EVERY 8 HOURS PRN
Qty: 30 TABLET | Refills: 0 | Status: SHIPPED | OUTPATIENT
Start: 2021-09-20 | End: 2021-12-28 | Stop reason: SDUPTHER

## 2021-10-07 ENCOUNTER — NURSING HOME VISIT (OUTPATIENT)
Dept: GERIATRICS | Facility: OTHER | Age: 71
End: 2021-10-07
Payer: COMMERCIAL

## 2021-10-07 DIAGNOSIS — K59.00 CONSTIPATION, UNSPECIFIED CONSTIPATION TYPE: Primary | ICD-10-CM

## 2021-10-07 DIAGNOSIS — I69.30 HISTORY OF HEMORRHAGIC CEREBROVASCULAR ACCIDENT (CVA) WITH RESIDUAL DEFICIT: ICD-10-CM

## 2021-10-07 DIAGNOSIS — E11.40 TYPE 2 DIABETES MELLITUS WITH DIABETIC NEUROPATHY, WITHOUT LONG-TERM CURRENT USE OF INSULIN (HCC): ICD-10-CM

## 2021-10-07 DIAGNOSIS — R53.81 DEBILITY: ICD-10-CM

## 2021-10-07 DIAGNOSIS — I10 HYPERTENSION, UNSPECIFIED TYPE: ICD-10-CM

## 2021-10-07 PROCEDURE — 99309 SBSQ NF CARE MODERATE MDM 30: CPT | Performed by: NURSE PRACTITIONER

## 2021-10-13 VITALS
RESPIRATION RATE: 17 BRPM | HEART RATE: 56 BPM | SYSTOLIC BLOOD PRESSURE: 139 MMHG | BODY MASS INDEX: 48.14 KG/M2 | DIASTOLIC BLOOD PRESSURE: 69 MMHG | TEMPERATURE: 98.2 F | WEIGHT: 312 LBS

## 2021-12-13 ENCOUNTER — NURSING HOME VISIT (OUTPATIENT)
Dept: GERIATRICS | Facility: OTHER | Age: 71
End: 2021-12-13
Payer: COMMERCIAL

## 2021-12-13 DIAGNOSIS — I10 HYPERTENSION, UNSPECIFIED TYPE: ICD-10-CM

## 2021-12-13 DIAGNOSIS — D63.8 ANEMIA OF CHRONIC DISEASE: ICD-10-CM

## 2021-12-13 DIAGNOSIS — R52 PAIN AFTER CEREBROVASCULAR ACCIDENT (CVA): Primary | ICD-10-CM

## 2021-12-13 DIAGNOSIS — I69.354 HEMIPARESIS AFFECTING LEFT SIDE AS LATE EFFECT OF CEREBROVASCULAR ACCIDENT (HCC): ICD-10-CM

## 2021-12-13 DIAGNOSIS — Z78.9 FULL CODE STATUS: ICD-10-CM

## 2021-12-13 DIAGNOSIS — I69.398 PAIN AFTER CEREBROVASCULAR ACCIDENT (CVA): Primary | ICD-10-CM

## 2021-12-13 DIAGNOSIS — K59.03 DRUG-INDUCED CONSTIPATION: ICD-10-CM

## 2021-12-13 DIAGNOSIS — E11.40 TYPE 2 DIABETES MELLITUS WITH DIABETIC NEUROPATHY, WITHOUT LONG-TERM CURRENT USE OF INSULIN (HCC): ICD-10-CM

## 2021-12-13 DIAGNOSIS — R53.81 DEBILITY: ICD-10-CM

## 2021-12-13 DIAGNOSIS — E66.9 OBESITY, UNSPECIFIED CLASSIFICATION, UNSPECIFIED OBESITY TYPE, UNSPECIFIED WHETHER SERIOUS COMORBIDITY PRESENT: ICD-10-CM

## 2021-12-13 DIAGNOSIS — G47.33 OBSTRUCTIVE SLEEP APNEA: ICD-10-CM

## 2021-12-13 DIAGNOSIS — E78.5 HYPERLIPIDEMIA, UNSPECIFIED HYPERLIPIDEMIA TYPE: ICD-10-CM

## 2021-12-13 DIAGNOSIS — I69.30 HISTORY OF HEMORRHAGIC CEREBROVASCULAR ACCIDENT (CVA) WITH RESIDUAL DEFICIT: ICD-10-CM

## 2021-12-13 DIAGNOSIS — R54 FRAILTY SYNDROME IN GERIATRIC PATIENT: ICD-10-CM

## 2021-12-13 PROCEDURE — 99309 SBSQ NF CARE MODERATE MDM 30: CPT | Performed by: INTERNAL MEDICINE

## 2021-12-14 PROBLEM — R54 FRAILTY SYNDROME IN GERIATRIC PATIENT: Status: ACTIVE | Noted: 2021-12-14

## 2021-12-14 PROBLEM — R52 PAIN AFTER CEREBROVASCULAR ACCIDENT (CVA): Status: ACTIVE | Noted: 2021-12-14

## 2021-12-14 PROBLEM — Z78.9 FULL CODE STATUS: Status: ACTIVE | Noted: 2021-12-14

## 2021-12-14 PROBLEM — K59.03 DRUG-INDUCED CONSTIPATION: Status: ACTIVE | Noted: 2019-08-07

## 2021-12-14 PROBLEM — I69.398 PAIN AFTER CEREBROVASCULAR ACCIDENT (CVA): Status: ACTIVE | Noted: 2021-12-14

## 2021-12-14 RX ORDER — PREGABALIN 75 MG/1
75 CAPSULE ORAL
Qty: 30 CAPSULE | Refills: 2 | Status: SHIPPED | OUTPATIENT
Start: 2021-12-14

## 2021-12-14 RX ORDER — PREGABALIN 50 MG/1
50 CAPSULE ORAL DAILY
COMMUNITY
End: 2022-01-10 | Stop reason: SDUPTHER

## 2021-12-28 DIAGNOSIS — R52 PAIN AFTER CEREBROVASCULAR ACCIDENT (CVA): Primary | ICD-10-CM

## 2021-12-28 DIAGNOSIS — G62.9 NEUROPATHY: ICD-10-CM

## 2021-12-28 DIAGNOSIS — I69.398 PAIN AFTER CEREBROVASCULAR ACCIDENT (CVA): Primary | ICD-10-CM

## 2021-12-28 RX ORDER — OXYCODONE HYDROCHLORIDE 5 MG/1
5 TABLET ORAL EVERY 8 HOURS PRN
Qty: 30 TABLET | Refills: 0 | Status: SHIPPED | OUTPATIENT
Start: 2021-12-28

## 2022-01-10 DIAGNOSIS — R52 PAIN AFTER CEREBROVASCULAR ACCIDENT (CVA): Primary | ICD-10-CM

## 2022-01-10 DIAGNOSIS — I69.398 PAIN AFTER CEREBROVASCULAR ACCIDENT (CVA): Primary | ICD-10-CM

## 2022-01-10 RX ORDER — PREGABALIN 50 MG/1
50 CAPSULE ORAL DAILY
Qty: 30 CAPSULE | Refills: 1 | Status: SHIPPED | OUTPATIENT
Start: 2022-01-10

## 2022-02-03 ENCOUNTER — NURSING HOME VISIT (OUTPATIENT)
Dept: GERIATRICS | Facility: OTHER | Age: 72
End: 2022-02-03
Payer: COMMERCIAL

## 2022-02-03 DIAGNOSIS — R52 PAIN AFTER CEREBROVASCULAR ACCIDENT (CVA): ICD-10-CM

## 2022-02-03 DIAGNOSIS — I69.398 PAIN AFTER CEREBROVASCULAR ACCIDENT (CVA): ICD-10-CM

## 2022-02-03 DIAGNOSIS — I69.30 HISTORY OF HEMORRHAGIC CEREBROVASCULAR ACCIDENT (CVA) WITH RESIDUAL DEFICIT: ICD-10-CM

## 2022-02-03 DIAGNOSIS — I69.354 HEMIPARESIS AFFECTING LEFT SIDE AS LATE EFFECT OF CEREBROVASCULAR ACCIDENT (HCC): ICD-10-CM

## 2022-02-03 DIAGNOSIS — R53.81 DEBILITY: Primary | ICD-10-CM

## 2022-02-03 DIAGNOSIS — I10 HYPERTENSION, UNSPECIFIED TYPE: ICD-10-CM

## 2022-02-03 DIAGNOSIS — E11.40 TYPE 2 DIABETES MELLITUS WITH DIABETIC NEUROPATHY, WITHOUT LONG-TERM CURRENT USE OF INSULIN (HCC): ICD-10-CM

## 2022-02-03 PROCEDURE — 99309 SBSQ NF CARE MODERATE MDM 30: CPT | Performed by: NURSE PRACTITIONER

## 2022-02-08 VITALS
HEART RATE: 56 BPM | SYSTOLIC BLOOD PRESSURE: 130 MMHG | BODY MASS INDEX: 49.53 KG/M2 | WEIGHT: 315 LBS | TEMPERATURE: 97.8 F | RESPIRATION RATE: 20 BRPM | DIASTOLIC BLOOD PRESSURE: 64 MMHG

## 2022-02-08 NOTE — ASSESSMENT & PLAN NOTE
· Controlled  · Continue with hydralazine, amlodipine, labetalol, lisinopril and furosemide  · Monitor for changes

## 2022-02-08 NOTE — ASSESSMENT & PLAN NOTE
· Well controlled  · Continue with change of lifestyle  · Monitor BS periodically  · Monitor for changes

## 2022-02-08 NOTE — ASSESSMENT & PLAN NOTE
· Doing well at [facility]  · Continue with restorative care  · Assist with ADL and IADL  · Fall precautions  · Monitor for changes

## 2022-02-08 NOTE — PROGRESS NOTES
[facility]  [address]  [phone]  [place]  LT Progress Note        NAME: Leticia Mulligan  AGE: 66 y o  SEX: male    DATE OF ENCOUNTER: 2/3/22    Assessment and Plan     1  Debility  Assessment & Plan:  · Doing well at LTCF  · Continue with restorative care  · Assist with ADL and IADL  · Fall precautions  · Monitor for changes      2  Hemiparesis affecting left side as late effect of cerebrovascular accident [facility])  Assessment & Plan:  · At baseline  · Continue with supportive care  · Assist with ADL and IADL  · Monitor for changes      3  History of hemorrhagic cerebrovascular accident (CVA) with residual deficit  Assessment & Plan:  · At baseline  · Continue with statin  · F/u with neuro as needed  · Monitor for changes      4  Hypertension, unspecified type  Assessment & Plan:  · Controlled  · Continue with hydralazine, amlodipine, labetalol, lisinopril and furosemide  · Monitor for changes      5  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)  Assessment & Plan:  · Chronic  · Continue with pregabalin, oxycodone   · Restorative care  · Monitor pain levels      6  Type 2 diabetes mellitus with diabetic neuropathy, without long-term current use of insulin ([facility] )  Assessment & Plan:  · Well controlled  · Continue with change of lifestyle  · Monitor BS periodically  · Monitor for changes          No orders of the defined types were placed in this encounter        History of Present Illness     Leticia Mulligan 66 y o  male seen for follow-up of care and treatment plan for ambulatory dysfunction doing well at [facility]TC[facility], hemiparesis left side and hx CVA at baseline, HTN doing well with BP meds, chronic pain after CVA controlled with pain meds, DM2 controlled with lifestyle changes     The following portions of the patient's history were reviewed and updated as appropriate: allergies, current medications, past family history, past medical history, past social history, past surgical history and problem list     Review of Systems     Review of Systems   Constitutional: Negative for chills and fever  HENT: Negative for ear pain and sore throat  Eyes: Negative for pain and visual disturbance  Respiratory: Negative for cough and shortness of breath  Cardiovascular: Negative for chest pain and palpitations  Gastrointestinal: Negative for abdominal pain and vomiting  Genitourinary: Negative for dysuria and hematuria  Musculoskeletal: Positive for gait problem  Negative for arthralgias and back pain  Skin: Negative for color change and rash  Neurological: Positive for weakness  Negative for seizures and syncope  All other systems reviewed and are negative  Objective     There were no vitals taken for this visit  Physical Exam  Vitals reviewed  Constitutional:       Appearance: He is well-developed  HENT:      Head: Normocephalic and atraumatic  Eyes:      Conjunctiva/sclera: Conjunctivae normal    Cardiovascular:      Rate and Rhythm: Normal rate and regular rhythm  Heart sounds: Normal heart sounds, S1 normal and S2 normal  No murmur heard  Pulmonary:      Effort: Pulmonary effort is normal  No respiratory distress  Breath sounds: Normal breath sounds  No wheezing  Abdominal:      General: Bowel sounds are normal  There is no distension  Palpations: Abdomen is soft  Tenderness: There is no abdominal tenderness  Musculoskeletal:         General: Normal range of motion  Cervical back: Normal range of motion  Right lower leg: Edema present  Left lower leg: Edema present  Comments: Generalized weakness   Skin:     General: Skin is warm and dry  Neurological:      Mental Status: He is alert  Psychiatric:         Attention and Perception: Attention normal          Mood and Affect: Mood normal          Speech: Speech normal          Behavior: Behavior normal          Thought Content:  Thought content normal          Pertinent Laboratory/Diagnostic Studies:  [facility] Labs Reviewed    Current Medications   Medication list reviewed and updated today  Please see paper chart for updated medication list      Michelle Henderson  28:07 AM      Name: Su Escobar  : 1950  MRN: [id number]  DOS: 2/3/22    Diagnoses:   Diagnosis ICD-10-CM Associated Orders   1  Debility  R53 81    2  Hemiparesis affecting left side as late effect of cerebrovascular accident ([facility] [facility] )  I69 354    3  History of hemorrhagic cerebrovascular accident (CVA) with residual deficit  I69 30    4  Hypertension, unspecified type  I10    5  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)  I69 398     R52    6   Type 2 diabetes mellitus with diabetic neuropathy, without long-term current use of insulin (HCC)  E11 40

## 2022-02-18 ENCOUNTER — NURSING HOME VISIT (OUTPATIENT)
Dept: GERIATRICS | Facility: OTHER | Age: 72
End: 2022-02-18
Payer: COMMERCIAL

## 2022-02-18 DIAGNOSIS — R60.0 LOWER EXTREMITY EDEMA: ICD-10-CM

## 2022-02-18 DIAGNOSIS — R53.81 DEBILITY: Primary | ICD-10-CM

## 2022-02-18 PROCEDURE — 99309 SBSQ NF CARE MODERATE MDM 30: CPT | Performed by: NURSE PRACTITIONER

## 2022-02-22 VITALS
TEMPERATURE: 98.3 F | DIASTOLIC BLOOD PRESSURE: 74 MMHG | RESPIRATION RATE: 20 BRPM | HEART RATE: 74 BPM | WEIGHT: 315 LBS | SYSTOLIC BLOOD PRESSURE: 122 MMHG | BODY MASS INDEX: 50 KG/M2

## 2022-02-22 NOTE — ASSESSMENT & PLAN NOTE
· Currently at [facility]TC[facility]  · Does not get out of bed  · Continue to shift weight as much as possible  · Assist with adl and iadl  · Fall precautions

## 2022-02-22 NOTE — ASSESSMENT & PLAN NOTE
· Increased BLLE edema  · With some blistering  · Pt refuses to wear compression   · Continue to monitor for any draining  · Increase lasix x 3 days  · Monitor for changes  · Labs in 3 days to monitor renal function

## 2022-04-14 ENCOUNTER — NURSING HOME VISIT (OUTPATIENT)
Dept: GERIATRICS | Facility: OTHER | Age: 72
End: 2022-04-14
Payer: COMMERCIAL

## 2022-04-14 DIAGNOSIS — R54 FRAILTY SYNDROME IN GERIATRIC PATIENT: ICD-10-CM

## 2022-04-14 DIAGNOSIS — Z78.9 FULL CODE STATUS: ICD-10-CM

## 2022-04-14 DIAGNOSIS — E11.40 TYPE 2 DIABETES MELLITUS WITH DIABETIC NEUROPATHY, WITHOUT LONG-TERM CURRENT USE OF INSULIN (HCC): Primary | ICD-10-CM

## 2022-04-14 DIAGNOSIS — I10 HYPERTENSION, UNSPECIFIED TYPE: ICD-10-CM

## 2022-04-14 DIAGNOSIS — K59.03 DRUG-INDUCED CONSTIPATION: ICD-10-CM

## 2022-04-14 DIAGNOSIS — I69.30 HISTORY OF HEMORRHAGIC CEREBROVASCULAR ACCIDENT (CVA) WITH RESIDUAL DEFICIT: ICD-10-CM

## 2022-04-14 DIAGNOSIS — I69.354 HEMIPARESIS AFFECTING LEFT SIDE AS LATE EFFECT OF CEREBROVASCULAR ACCIDENT (HCC): ICD-10-CM

## 2022-04-14 PROCEDURE — 99309 SBSQ NF CARE MODERATE MDM 30: CPT | Performed by: INTERNAL MEDICINE

## 2022-04-14 NOTE — PROGRESS NOTES
Roderick 11  [address]  Facility: [facility]  32  Follow-up visit    NAME: Brad Sierra  AGE: 66 y o  SEX: male    DATE OF ENCOUNTER: 4/14/2022    Code status:  CPR    Assessment and Plan     1  Type 2 diabetes mellitus with diabetic neuropathy, without long-term current use of insulin [facility])  Assessment & Plan:  · December 15, 2021: Hemoglobin A1c:  6%,   · Review of his fingerstick blood sugar log looks well without medication   · Will continue with TLC and will update his laboratory values  · Will continue with monitoring for change in his condition      2  Frailty syndrome in geriatric patient  Assessment & Plan:  · Secondary to his chronic medical conditions  · He continues to require 24/7 care/support his ADLs   · He is level 7 on the clinical frailty scale consistent with being severely frail   · I recommend continued care/support his ADLs as a long-term resident at the nursing facility   · Will continue to monitor for change in his condition      3  Drug-induced constipation  Assessment & Plan:  · Nursing endorses that he is doing well with routine MiraLax and Senokot S   · Will continue with this medication regimen   · Will continue with monitoring for change in his condition      4  Hypertension, unspecified type  Assessment & Plan:  · Review his BP and AP logs looks well with amlodipine, hydralazine, labetalol, and lisinopril   · Will continue with this medication   · Will continue with clinical and periodic laboratory monitoring for change in his condition      5   History of hemorrhagic cerebrovascular accident (CVA) with residual deficit  Assessment & Plan:  · His doing well with preventative care of atorvastatin and blood pressure management  · Will continue with care/support of his ADLs as a long-term resident at the nursing facility   · Will continue his care in collaboration with his neurology service  · Will continue with monitoring for change in his condition      6  Hemiparesis affecting left side as late effect of cerebrovascular accident [facility])  Assessment & Plan:  · Will continue with care/support his ADLs as a long-term resident at the nursing facility   · See CVA heading for further information      7  Full code status    See my note of December 13, 2021 for further assessment and plan  I have ordered a BMP and hemoglobin A1c to be performed to assist in his medical care  All medications and routine orders were reviewed and updated as needed  Plan discussed with:  Patient and nursing staff  Chief Complaint     He is seen for a follow-up visit to update the care and treatment of his type 2 diabetes mellitus, frailty secondary to his chronic medical conditions, drug-induced constipation, and hypertension  History of Present Illness     He is a 67-year-old gentleman who is seen with nursing staff for a follow-up visit to update the care and treatment of his type 2 diabetes mellitus-he continues without medications and to have his laboratory values updated, frailty secondary to his chronic medical conditions-he continues to require 24/7 care/support his ADLs, drug-induced constipation-nursing staff endorses that he is doing well with routine MiraLax and Senokot S, and hypertension-doing well with amlodipine, labetalol, lisinopril, and hydralazine  Nursing staff endorses that his appetite is good, that he is sleeping well, and that he is having no difficulty with constipation  He requires assistance with his ADLs  Nursing reports that he is not exhibiting any behaviors that are disruptive to the unit or distressing to him  Nursing reports that he finished a course of antibiotics for cellulitis of his legs and that his legs have improved      The following portions of the patient's history were reviewed and updated as appropriate: current medications, past family history, past medical history, past social history, past surgical history and problem list     Allergies:  No Known Allergies    Review of Systems     Review of Systems   Respiratory: Negative for shortness of breath  Cardiovascular: Negative for chest pain  See HPI    Medications and orders     All medications reviewed and updated in Nursing Home EMR  Objective     Vitals:  Weight 335 lb (stable for 2 months), pulse 72, blood pressure 140/74, fasting fingerstick blood sugar 124  Physical Exam  Vitals reviewed  Exam conducted with a chaperone present  Constitutional:       General: He is awake  He is not in acute distress  Appearance: He is well-developed  He is not ill-appearing, toxic-appearing or diaphoretic  Comments: Appears comfortable, stated age, and frail  Cardiovascular:      Rate and Rhythm: Normal rate and regular rhythm  Heart sounds: Normal heart sounds  No murmur heard  No friction rub  No gallop  Comments: 4+ bilateral chronic pretibial edema  Pulmonary:      Effort: Pulmonary effort is normal  No respiratory distress  Breath sounds: Normal breath sounds  No stridor  No wheezing, rhonchi or rales  Abdominal:      General: Abdomen is flat  There is no distension  Palpations: Abdomen is soft  There is no mass  Tenderness: There is no abdominal tenderness  There is no guarding or rebound  Skin:     Comments: Resolving redness of bilateral lower extremities  No open areas or drainage  Neurological:      Mental Status: He is alert  Psychiatric:         Mood and Affect: Mood normal          Behavior: Behavior normal  Behavior is cooperative  Pertinent Laboratory/Diagnostic Studies: The following labs were reviewed please see chart or hospital paperwork for details  December 15, 2021:     Hemoglobin A1c:  6%,     - His order summary was reviewed and signed        Portions of the record may have been created with voice recognition software  Occasional wrong word or "sound a like" substitutions may have occurred due to the inherent limitations of voice recognition software  Read the chart carefully and recognize, using context, where substitutions have occurred      ALMA Fatima   4/18/2022 8:57 AM

## 2022-04-18 NOTE — ASSESSMENT & PLAN NOTE
· Will continue with care/support his ADLs as a long-term resident at the nursing facility   · See CVA heading for further information

## 2022-04-18 NOTE — ASSESSMENT & PLAN NOTE
· His doing well with preventative care of atorvastatin and blood pressure management  · Will continue with care/support of his ADLs as a long-term resident at the nursing facility   · Will continue his care in collaboration with his neurology service  · Will continue with monitoring for change in his condition

## 2022-04-18 NOTE — ASSESSMENT & PLAN NOTE
· Review his BP and AP logs looks well with amlodipine, hydralazine, labetalol, and lisinopril   · Will continue with this medication   · Will continue with clinical and periodic laboratory monitoring for change in his condition

## 2022-04-18 NOTE — ASSESSMENT & PLAN NOTE
· Secondary to his chronic medical conditions  · He continues to require 24/7 care/support his ADLs   · He is level 7 on the clinical frailty scale consistent with being severely frail   · I recommend continued care/support his ADLs as a long-term resident at the nursing facility   · Will continue to monitor for change in his condition

## 2022-04-18 NOTE — ASSESSMENT & PLAN NOTE
· December 15, 2021: Hemoglobin A1c:  6%,   · Review of his fingerstick blood sugar log looks well without medication   · Will continue with TLC and will update his laboratory values  · Will continue with monitoring for change in his condition

## 2022-04-18 NOTE — ASSESSMENT & PLAN NOTE
· Nursing endorses that he is doing well with routine MiraLax and Senokot S   · Will continue with this medication regimen   · Will continue with monitoring for change in his condition

## 2022-05-26 ENCOUNTER — NURSING HOME VISIT (OUTPATIENT)
Dept: GERIATRICS | Facility: OTHER | Age: 72
End: 2022-05-26
Payer: COMMERCIAL

## 2022-05-26 DIAGNOSIS — R60.0 LOWER EXTREMITY EDEMA: ICD-10-CM

## 2022-05-26 DIAGNOSIS — R53.81 DEBILITY: Primary | ICD-10-CM

## 2022-05-26 DIAGNOSIS — E11.40 TYPE 2 DIABETES MELLITUS WITH DIABETIC NEUROPATHY, WITHOUT LONG-TERM CURRENT USE OF INSULIN (HCC): ICD-10-CM

## 2022-05-26 DIAGNOSIS — I10 HYPERTENSION, UNSPECIFIED TYPE: ICD-10-CM

## 2022-05-26 DIAGNOSIS — I69.354 HEMIPARESIS AFFECTING LEFT SIDE AS LATE EFFECT OF CEREBROVASCULAR ACCIDENT (HCC): ICD-10-CM

## 2022-05-26 PROCEDURE — 99309 SBSQ NF CARE MODERATE MDM 30: CPT | Performed by: NURSE PRACTITIONER

## 2022-05-31 VITALS
TEMPERATURE: 97.8 F | RESPIRATION RATE: 20 BRPM | DIASTOLIC BLOOD PRESSURE: 55 MMHG | SYSTOLIC BLOOD PRESSURE: 97 MMHG | WEIGHT: 315 LBS | BODY MASS INDEX: 48.76 KG/M2 | HEART RATE: 54 BPM

## 2022-05-31 NOTE — ASSESSMENT & PLAN NOTE
· Daily control   · Continue with insulin  · Monitor BS daily  · Adjust insulin at needed  · monitor for acute changes

## 2022-05-31 NOTE — ASSESSMENT & PLAN NOTE
· Chronic edema  · +1 to +2 pitting   · Continue to elevate legs as much as possible   · Encourage to be  for compression   · Continue with furosemide   · Monitor BMP for kidney function   · Monitor weights

## 2022-05-31 NOTE — ASSESSMENT & PLAN NOTE
· Controlled   · Continue with amlodipine, metolazone, furosemide, lisinopril, labetalol, hydralazine  · Continue to monitor BP   · Monitor for acute changes

## 2022-05-31 NOTE — ASSESSMENT & PLAN NOTE
· Doing well at long-term care facility   · Continue to assist with IADLs and ADLs  · Continue supportive care   · Continue with restorative care   · Fall precautions  · Encourage out of bed for activities   · Monitor for any acute changes

## 2022-05-31 NOTE — ASSESSMENT & PLAN NOTE
· At baseline   · Continue with supportive care   · Assist with ADL and I ADL   · Monitor for acute changes

## 2022-05-31 NOTE — PROGRESS NOTES
[place] [address] [address]  [phone]  Renée Sharon  LTCF Progress Note        NAME: Vasquez Mulligan  AGE: 66 y o  SEX: male    DATE OF ENCOUNTER: 5/26/22    Assessment and Plan     1  Debility  Assessment & Plan:  · Doing well at long-term care facility   · Continue to assist with IADLs and ADLs  · Continue supportive care   · Continue with restorative care   · Fall precautions  · Encourage out of bed for activities   · Monitor for any acute changes      2  Hemiparesis affecting left side as late effect of cerebrovascular accident [facility])  Assessment & Plan:  · At baseline   · Continue with supportive care   · Assist with ADL and I ADL   · Monitor for acute changes      3  Hypertension, unspecified type  Assessment & Plan:  · Controlled   · Continue with amlodipine, metolazone, furosemide, lisinopril, labetalol, hydralazine  · Continue to monitor BP   · Monitor for acute changes      4  Lower extremity edema  Assessment & Plan:  · Chronic edema  · +1 to +2 pitting   · Continue to elevate legs as much as possible   · Encourage to be  for compression   · Continue with furosemide   · Monitor BMP for kidney function   · Monitor weights      5  Type 2 diabetes mellitus with diabetic neuropathy, without long-term current use of insulin ([facility])  Assessment & Plan:  · Daily control   · Continue with insulin  · Monitor BS daily  · Adjust insulin at needed  · monitor for acute changes          No orders of the defined types were placed in this encounter        History of Present Illness     Vasquez Mulligan 66 y o  male seen for follow-up of care and treatment plan for ambulatory dysfunction doing well at LTCF, Hemiparesis left side at baseline, hypertension doing well with BP meds, lower extremity edema controlled with compression    The following portions of the patient's history were reviewed and updated as appropriate: allergies, current medications, past family history, past medical history, past social history, past surgical history and problem list     Review of Systems     Review of Systems   Constitutional: Negative for chills and fever  HENT: Negative for ear pain and sore throat  Eyes: Negative for pain and visual disturbance  Respiratory: Negative for cough, shortness of breath and wheezing  Cardiovascular: Positive for leg swelling  Negative for chest pain and palpitations  Gastrointestinal: Negative for abdominal pain and vomiting  Genitourinary: Negative for dysuria and hematuria  Musculoskeletal: Positive for gait problem  Negative for arthralgias and back pain  Skin: Negative for color change and rash  Neurological: Positive for weakness  Negative for seizures and syncope  Psychiatric/Behavioral: Positive for confusion ( At times)  All other systems reviewed and are negative  Objective     BP 97/55   Pulse (!) 54   Temp 97 8 °F (36 6 °C)   Resp 20   Wt (!) 143 kg (316 lb)   BMI 48 76 kg/m²     Physical Exam  Vitals reviewed  Constitutional:       Appearance: He is well-developed  HENT:      Head: Normocephalic and atraumatic  Eyes:      Conjunctiva/sclera: Conjunctivae normal    Cardiovascular:      Rate and Rhythm: Normal rate and regular rhythm  Heart sounds: Normal heart sounds, S1 normal and S2 normal  No murmur heard  Pulmonary:      Effort: Pulmonary effort is normal  No respiratory distress  Breath sounds: Normal breath sounds  No wheezing  Abdominal:      General: Bowel sounds are normal  There is no distension  Palpations: Abdomen is soft  Tenderness: There is no abdominal tenderness  Musculoskeletal:         General: Normal range of motion  Cervical back: Normal range of motion  Right lower le+ Edema present  Left lower le+ Edema present  Comments: Generalized weakness   Skin:     General: Skin is warm and dry  Neurological:      Mental Status: He is alert  Psychiatric:         Attention and Perception: Attention normal          Mood and Affect: Affect is flat  Speech: Speech normal          Behavior: Behavior normal          Cognition and Memory: Cognition is impaired  Pertinent Laboratory/Diagnostic Studies:  [facility] Labs Reviewed    Current Medications   Medication list reviewed and updated today  Please see paper chart for updated medication list      Kellen Poon  :06 PM      Name: Frank Mtz  : 1950  MRN: [id number]  DOS:  2022    Diagnoses:   Diagnosis ICD-10-CM Associated Orders   1  Debility  R53 81    2  Hemiparesis affecting left side as late effect of cerebrovascular accident ([facility] [address] )  I69 354    3  Hypertension, unspecified type  I10    4  Lower extremity edema  R60 0    5   Type 2 diabetes mellitus with diabetic neuropathy, without long-term current use of insulin ([facility])  E11 40

## 2022-08-02 ENCOUNTER — NURSING HOME VISIT (OUTPATIENT)
Dept: GERIATRICS | Facility: OTHER | Age: 72
End: 2022-08-02
Payer: COMMERCIAL

## 2022-08-02 DIAGNOSIS — I69.398 PAIN AFTER CEREBROVASCULAR ACCIDENT (CVA): ICD-10-CM

## 2022-08-02 DIAGNOSIS — I10 HYPERTENSION, UNSPECIFIED TYPE: ICD-10-CM

## 2022-08-02 DIAGNOSIS — R54 FRAILTY SYNDROME IN GERIATRIC PATIENT: ICD-10-CM

## 2022-08-02 DIAGNOSIS — R60.0 LOWER EXTREMITY EDEMA: ICD-10-CM

## 2022-08-02 DIAGNOSIS — D63.8 ANEMIA OF CHRONIC DISEASE: ICD-10-CM

## 2022-08-02 DIAGNOSIS — R52 PAIN AFTER CEREBROVASCULAR ACCIDENT (CVA): ICD-10-CM

## 2022-08-02 DIAGNOSIS — Z78.9 FULL CODE STATUS: ICD-10-CM

## 2022-08-02 DIAGNOSIS — I69.354 HEMIPARESIS AFFECTING LEFT SIDE AS LATE EFFECT OF CEREBROVASCULAR ACCIDENT (HCC): Primary | ICD-10-CM

## 2022-08-02 DIAGNOSIS — K59.03 DRUG-INDUCED CONSTIPATION: ICD-10-CM

## 2022-08-02 DIAGNOSIS — E87.6 DRUG-INDUCED HYPOKALEMIA: ICD-10-CM

## 2022-08-02 DIAGNOSIS — T50.905A DRUG-INDUCED HYPOKALEMIA: ICD-10-CM

## 2022-08-02 DIAGNOSIS — E11.40 TYPE 2 DIABETES MELLITUS WITH DIABETIC NEUROPATHY, WITHOUT LONG-TERM CURRENT USE OF INSULIN (HCC): ICD-10-CM

## 2022-08-02 DIAGNOSIS — I69.30 HISTORY OF HEMORRHAGIC CEREBROVASCULAR ACCIDENT (CVA) WITH RESIDUAL DEFICIT: ICD-10-CM

## 2022-08-02 PROCEDURE — 99306 1ST NF CARE HIGH MDM 50: CPT | Performed by: INTERNAL MEDICINE

## 2022-08-02 NOTE — PROGRESS NOTES
[address]  [address] [place] [address]  Facility: [facility]  32  Follow-up visit    NAME: Dominic Quesada  AGE: 66 y o  SEX: male    DATE OF ENCOUNTER: 8/2/2022    Code status:  CPR    Assessment and Plan     1  Hemiparesis affecting left side as late effect of cerebrovascular accident [facility])  Assessment & Plan:  · He is doing well with his antihypertensive treatment and atorvastatin   · Will continue with this care plan  · Will continue with care/support his ADLs as a long-term resident at the nursing facility   · Will continue with clinical and periodic laboratory monitoring for change in his condition      2  Type 2 diabetes mellitus with diabetic neuropathy, without long-term current use of insulin [facility])  Assessment & Plan:  · April 21, 2021: Hemoglobin A1c:  5 9%  · December 15, 2021: Hemoglobin A1c:  6%,   · April 21, 2022: Hemoglobin A1c:  6%,   · He is doing well without medication   · Will continue to encourage lifestyle changes  · Will continue with clinical and periodic laboratory monitoring for change in his condition      3  Frailty syndrome in geriatric patient  Assessment & Plan:  · Secondary to his chronic medical conditions   · He continues to require 24/7 care/support his ADLs   · He is level 7 on the clinical frailty scale consistent with being severely frail  · I recommend continued care/support his ADLs as a long-term resident at the nursing facility   · Will continue to monitor for change in his condition      4  Drug-induced constipation  Assessment & Plan:  · Secondary to chronic opioid use   · He and nursing staff report that he is doing well with routine Senokot S and MiraLax  · Will continue with this care plan  · Will continue with monitoring for change in his condition      5   Drug-induced hypokalemia  Assessment & Plan:  · July 6, 2022: Potassium level:  4 2   · He is doing well with oral potassium supplement  · Will continue with his current care plan and periodic laboratory monitoring for change in his condition      6  Anemia of chronic disease  Assessment & Plan:  · July 6, 2022: Hemoglobin/hematocrit:  12 3/37 3/MCV 80  · He is doing well  · Will continue with clinical and periodic laboratory monitoring for change in his condition      7  History of hemorrhagic cerebrovascular accident (CVA) with residual deficit  Assessment & Plan:  · He is doing well with his antihypertensive treatment  · Will continue with monitoring for change in his condition      8  Hypertension, unspecified type  Assessment & Plan:  · Review of his BP and AP logs looks well with labetalol, amlodipine, hydralazine, and lisinopril  · Will continue with this medication regimen   · Will continue with clinical and periodic laboratory monitoring for change in his condition      9  Lower extremity edema  Assessment & Plan:  · His condition is stable and he feels well with his current furosemide and potassium therapy  · Will continue with clinical and periodic laboratory monitoring for change in his condition      10  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)  Assessment & Plan:  · He reports that his pain is under control with his current pregabalin and oxycodone   · Will continue with this care plan  · Will continue with monitoring for change in his condition    Orders:  -     oxyCODONE (ROXICODONE) 5 immediate release tablet; Take 1 tablet (5 mg total) by mouth daily at bedtime Max Daily Amount: 5 mg    11  Full code status    See my note of April 14, 2022 for further assessment and plan  I have asked for a CBC with diff to be performed on September 8, 2022 to assist in his care  All medications and routine orders were reviewed and updated as needed  Plan discussed with:  Patient and nursing staff      Chief Complaint     He is seen for a follow-up visit to update the care and treatment of his history of CVA with left-sided hemiparesis, type 2 diabetes mellitus, frailty secondary to his chronic medical conditions, and drug-induced constipation  History of Present Illness     He is a pleasant 27-year-old gentleman who is seen for a follow-up visit to update the care and treatment of his history of CVA with left-sided hemiparesis-doing well with treatment of his blood pressure and atorvastatin, type 2 diabetes mellitus-doing well without medication, frailty secondary to his chronic medical conditions-he continues to require 24/7 care/support his ADLs, and drug-induced constipation-doing well with routine Senokot S and MiraLax  Nursing staff from reports that his appetite is good, that he is sleeping well, and that he is having no difficulty with constipation  He requires assistance with his ADLs  Nursing reports that he is not exhibiting any behaviors that are distressing to him or disruptive to the nursing unit  He reports that his appetite is good  He denies chest pain, shortness of breath, and constipation  When asked, he reports that his pain is controlled with his current medications  He states that his legs do not feel swollen  The following portions of the patient's history were reviewed and updated as appropriate: current medications, past family history, past medical history, past social history, past surgical history and problem list     Allergies:  No Known Allergies    Review of Systems     Review of Systems   Constitutional: Negative for appetite change  Respiratory: Negative for shortness of breath  Cardiovascular: Negative for chest pain  Gastrointestinal: Negative for constipation  Medications and orders     All medications reviewed and updated in Nursing Home EMR  Objective     Vitals:  Monthly vitals:  Weight 313 5 lb (stable for 3 months), pulse 52, blood pressure 134/68, fasting fingerstick blood sugar 113  Review of his BP and AP logs looks well      Physical Exam  Vitals reviewed  Constitutional:       General: He is awake  He is not in acute distress  Appearance: He is not toxic-appearing or diaphoretic  Comments: He appears comfortable lying in bed, his stated age, and frail  Cardiovascular:      Rate and Rhythm: Normal rate and regular rhythm  Heart sounds: Normal heart sounds  No murmur heard  No friction rub  No gallop  Comments: There is soft pitting edema of bilateral lower extremities to at least his knees  Pulmonary:      Effort: Pulmonary effort is normal  No respiratory distress  Breath sounds: Normal breath sounds  No stridor  No wheezing, rhonchi or rales  Neurological:      Mental Status: He is alert  Psychiatric:         Mood and Affect: Mood normal          Behavior: Behavior normal  Behavior is cooperative  Pertinent Laboratory/Diagnostic Studies: The following labs were reviewed please see chart or hospital paperwork for details  April 21, 2022:     Hemoglobin A1c:  6%,      July 6, 2022: BMP:  Sodium 144, potassium 4 2, BUN 13, creatinine 0 91, fasting blood sugar 99, EGFR 90     CBC with diff:  WBC count 6, hemoglobin 12 3, hematocrit 37 3, platelet count 850185, MCV 87    - His order summary was reviewed and signed  Portions of the record may have been created with voice recognition software  Occasional wrong word or "sound a like" substitutions may have occurred due to the inherent limitations of voice recognition software  Read the chart carefully and recognize, using context, where substitutions have occurred      ALAM Nelson   8/11/2022 7:49 AM

## 2022-08-11 PROBLEM — E87.6 DRUG-INDUCED HYPOKALEMIA: Status: ACTIVE | Noted: 2022-08-11

## 2022-08-11 PROBLEM — T50.905A DRUG-INDUCED HYPOKALEMIA: Status: ACTIVE | Noted: 2022-08-11

## 2022-08-11 RX ORDER — HYDRALAZINE HYDROCHLORIDE 25 MG/1
25 TABLET, FILM COATED ORAL 4 TIMES DAILY
COMMUNITY
Start: 2022-08-03

## 2022-08-11 RX ORDER — FLUOXETINE HYDROCHLORIDE 40 MG/1
40 CAPSULE ORAL DAILY
COMMUNITY
Start: 2022-06-24

## 2022-08-11 RX ORDER — OXYCODONE HYDROCHLORIDE 5 MG/1
5 TABLET ORAL
Qty: 30 TABLET | Refills: 0 | Status: SHIPPED
Start: 2022-08-11 | End: 2022-10-29 | Stop reason: SDUPTHER

## 2022-08-11 RX ORDER — AMLODIPINE BESYLATE 5 MG/1
5 TABLET ORAL DAILY
COMMUNITY
Start: 2022-08-03

## 2022-08-11 NOTE — ASSESSMENT & PLAN NOTE
· Review of his BP and AP logs looks well with labetalol, amlodipine, hydralazine, and lisinopril  · Will continue with this medication regimen   · Will continue with clinical and periodic laboratory monitoring for change in his condition

## 2022-08-11 NOTE — ASSESSMENT & PLAN NOTE
· He is doing well with his antihypertensive treatment and atorvastatin   · Will continue with this care plan  · Will continue with care/support his ADLs as a long-term resident at the nursing facility   · Will continue with clinical and periodic laboratory monitoring for change in his condition

## 2022-08-11 NOTE — ASSESSMENT & PLAN NOTE
· He reports that his pain is under control with his current pregabalin and oxycodone   · Will continue with this care plan  · Will continue with monitoring for change in his condition

## 2022-08-11 NOTE — ASSESSMENT & PLAN NOTE
· His condition is stable and he feels well with his current furosemide and potassium therapy  · Will continue with clinical and periodic laboratory monitoring for change in his condition

## 2022-08-11 NOTE — ASSESSMENT & PLAN NOTE
· Secondary to chronic opioid use   · He and nursing staff report that he is doing well with routine Senokot S and MiraLax  · Will continue with this care plan  · Will continue with monitoring for change in his condition

## 2022-08-11 NOTE — ASSESSMENT & PLAN NOTE
· He is doing well with his antihypertensive treatment  · Will continue with monitoring for change in his condition

## 2022-08-11 NOTE — ASSESSMENT & PLAN NOTE
· July 6, 2022: Hemoglobin/hematocrit:  12 3/37 3/MCV 80  · He is doing well  · Will continue with clinical and periodic laboratory monitoring for change in his condition

## 2022-08-11 NOTE — ASSESSMENT & PLAN NOTE
· April 21, 2021: Hemoglobin A1c:  5 9%  · December 15, 2021: Hemoglobin A1c:  6%,   · April 21, 2022: Hemoglobin A1c:  6%,   · He is doing well without medication   · Will continue to encourage lifestyle changes  · Will continue with clinical and periodic laboratory monitoring for change in his condition

## 2022-08-11 NOTE — ASSESSMENT & PLAN NOTE
· July 6, 2022: Potassium level:  4 2   · He is doing well with oral potassium supplement  · Will continue with his current care plan and periodic laboratory monitoring for change in his condition

## 2022-09-06 DIAGNOSIS — I69.398 PAIN AFTER CEREBROVASCULAR ACCIDENT (CVA): Primary | ICD-10-CM

## 2022-09-06 DIAGNOSIS — R52 PAIN AFTER CEREBROVASCULAR ACCIDENT (CVA): Primary | ICD-10-CM

## 2022-09-06 RX ORDER — PREGABALIN 75 MG/1
75 CAPSULE ORAL
Qty: 30 CAPSULE | Refills: 3 | Status: SHIPPED | OUTPATIENT
Start: 2022-09-06

## 2022-09-07 NOTE — TELEPHONE ENCOUNTER
Chart update     1  Prescription refill request from facility nursing staff processed for 1 month with 3 refills

## 2022-10-10 ENCOUNTER — NURSING HOME VISIT (OUTPATIENT)
Dept: GERIATRICS | Facility: OTHER | Age: 72
End: 2022-10-10
Payer: COMMERCIAL

## 2022-10-10 DIAGNOSIS — K59.03 DRUG-INDUCED CONSTIPATION: ICD-10-CM

## 2022-10-10 DIAGNOSIS — E87.6 DRUG-INDUCED HYPOKALEMIA: ICD-10-CM

## 2022-10-10 DIAGNOSIS — R52 PAIN AFTER CEREBROVASCULAR ACCIDENT (CVA): ICD-10-CM

## 2022-10-10 DIAGNOSIS — I10 HYPERTENSION, UNSPECIFIED TYPE: ICD-10-CM

## 2022-10-10 DIAGNOSIS — G47.33 OBSTRUCTIVE SLEEP APNEA: ICD-10-CM

## 2022-10-10 DIAGNOSIS — E11.40 TYPE 2 DIABETES MELLITUS WITH DIABETIC NEUROPATHY, WITHOUT LONG-TERM CURRENT USE OF INSULIN (HCC): Primary | ICD-10-CM

## 2022-10-10 DIAGNOSIS — I69.30 HISTORY OF HEMORRHAGIC CEREBROVASCULAR ACCIDENT (CVA) WITH RESIDUAL DEFICIT: ICD-10-CM

## 2022-10-10 DIAGNOSIS — R60.0 LOWER EXTREMITY EDEMA: ICD-10-CM

## 2022-10-10 DIAGNOSIS — I69.354 HEMIPARESIS AFFECTING LEFT SIDE AS LATE EFFECT OF CEREBROVASCULAR ACCIDENT (HCC): ICD-10-CM

## 2022-10-10 DIAGNOSIS — I69.398 PAIN AFTER CEREBROVASCULAR ACCIDENT (CVA): ICD-10-CM

## 2022-10-10 DIAGNOSIS — R54 FRAILTY SYNDROME IN GERIATRIC PATIENT: ICD-10-CM

## 2022-10-10 DIAGNOSIS — E78.5 HYPERLIPIDEMIA, UNSPECIFIED HYPERLIPIDEMIA TYPE: ICD-10-CM

## 2022-10-10 DIAGNOSIS — Z78.9 FULL CODE STATUS: ICD-10-CM

## 2022-10-10 DIAGNOSIS — T50.905A DRUG-INDUCED HYPOKALEMIA: ICD-10-CM

## 2022-10-10 PROCEDURE — 99309 SBSQ NF CARE MODERATE MDM 30: CPT | Performed by: INTERNAL MEDICINE

## 2022-10-11 NOTE — ASSESSMENT & PLAN NOTE
· He is doing well with hydralazine, labetalol, amlodipine, and lisinopril   · Will continue with this treatment regimen   · Will continue with clinical and periodic laboratory monitoring for change in his condition

## 2022-10-11 NOTE — ASSESSMENT & PLAN NOTE
· He continues to do well with treatment of his hypertension   · He is following with his neurology service as needed with his last office visit being September 22, 2020  · Will continue with monitoring for change in his condition

## 2022-10-11 NOTE — ASSESSMENT & PLAN NOTE
· He is doing well care/support his ADLs at the nursing facility   · Will continue with secondary stroke prevention  · Will continue with care/support of his ADLs  · Will continue with monitoring for change in his condition

## 2022-10-11 NOTE — ASSESSMENT & PLAN NOTE
· Secondary to chronic opioid use for pain control   · Nursing reports he is doing well with routine MiraLax and Senokot S   · Will continue with this care plan  · Will continue with monitoring for change in his condition

## 2022-10-11 NOTE — ASSESSMENT & PLAN NOTE
· He reports using his CPAP machine on a regular basis  · Will continue with monitoring for change in his condition

## 2022-10-11 NOTE — ASSESSMENT & PLAN NOTE
· He is doing well with atorvastatin   · Will continue with periodic laboratory monitoring for change in his condition

## 2022-10-11 NOTE — ASSESSMENT & PLAN NOTE
· Secondary to chronic furosemide use for lower extremity edema  · He is doing well with oral potassium supplement  · Will continue with monitoring for change in his condition

## 2022-10-11 NOTE — ASSESSMENT & PLAN NOTE
· October 8, 2014:  Hemoglobin A1c:  6 8%,    · April 21, 2021: Hemoglobin A1c:  5 9%  · December 15, 2021: Hemoglobin A1c:  6%,   · April 21, 2022: Hemoglobin A1c:  6%,   · Review of his fingerstick blood sugar log shows that he is doing well without medication  · Will continue with clinical and periodic laboratory monitoring for change in his condition

## 2022-10-11 NOTE — ASSESSMENT & PLAN NOTE
· Swelling is still present in both of his legs, but is slightly improved   · Will continue with his current furosemide regimen  · Will continue with clinical and periodic laboratory monitoring for change in his condition

## 2022-10-11 NOTE — PROGRESS NOTES
[address]  Facility: [address] [facility] [address] [place] [address]  Follow-up visit    NAME: Ciaran Bianchi  AGE: 67 y o  SEX: male    DATE OF ENCOUNTER: 10/10/2022    Code status:  CPR    Assessment and Plan     1  Type 2 diabetes mellitus with diabetic neuropathy, without long-term current use of insulin [facility]  Assessment & Plan:  · October 8, 2014:  Hemoglobin A1c:  6 8%,    · April 21, 2021: Hemoglobin A1c:  5 9%  · December 15, 2021: Hemoglobin A1c:  6%,   · April 21, 2022: Hemoglobin A1c:  6%,   · Review of his fingerstick blood sugar log shows that he is doing well without medication  · Will continue with clinical and periodic laboratory monitoring for change in his condition      2  Frailty syndrome in geriatric patient  Assessment & Plan:  · Secondary to his chronic medical conditions   · He continues to require 24/7 care/support of his ADLs   · He is level 7 on the clinical frailty scale consistent with being severely frail   · I recommend continued care/support of his ADLs as a long-term resident at the nursing facility   · Will continue to monitor for change in his condition      3  Drug-induced constipation  Assessment & Plan:  · Secondary to chronic opioid use for pain control   · Nursing reports he is doing well with routine MiraLax and Senokot S   · Will continue with this care plan  · Will continue with monitoring for change in his condition      4  Lower extremity edema  Assessment & Plan:  · Swelling is still present in both of his legs, but is slightly improved   · Will continue with his current furosemide regimen  · Will continue with clinical and periodic laboratory monitoring for change in his condition      5   Drug-induced hypokalemia  Assessment & Plan:  · Secondary to chronic furosemide use for lower extremity edema  · He is doing well with oral potassium supplement  · Will continue with monitoring for change in his condition      6  Hypertension, unspecified type  Assessment & Plan:  · He is doing well with hydralazine, labetalol, amlodipine, and lisinopril   · Will continue with this treatment regimen   · Will continue with clinical and periodic laboratory monitoring for change in his condition      7  History of hemorrhagic cerebrovascular accident (CVA) with residual deficit  Assessment & Plan:  · He continues to do well with treatment of his hypertension   · He is following with his neurology service as needed with his last office visit being September 22, 2020  · Will continue with monitoring for change in his condition      8  Hemiparesis affecting left side as late effect of cerebrovascular accident [facility])  Assessment & Plan:  · He is doing well care/support his ADLs at the nursing facility   · Will continue with secondary stroke prevention  · Will continue with care/support of his ADLs  · Will continue with monitoring for change in his condition      9  Hyperlipidemia, unspecified hyperlipidemia type  Assessment & Plan:  · He is doing well with atorvastatin   · Will continue with periodic laboratory monitoring for change in his condition      10  Obstructive sleep apnea  Assessment & Plan:  · He reports using his CPAP machine on a regular basis  · Will continue with monitoring for change in his condition      11  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)  Assessment & Plan:  · He reports that his pain is under control with current pregabalin and oxycodone regimen  · Will continue with this treatment regimen   · Will continue with monitoring for change in his condition      12  Full code status    See my note of August 2, 2022 for further information  All medications and routine orders were reviewed and updated as needed  Plan discussed with:  Patient and nursing staff      Chief Complaint     He is seen for a follow-up visit to update the care and treatment of his type 2 diabetes mellitus, frailty secondary to his chronic medical conditions, constipation secondary to chronic opioid use, and lower extremity edema  History of Present Illness     He is a pleasant 69-year-old gentleman who is seen for a follow-up visit to update the care and treatment of his type 2 diabetes mellitus-doing well without medication, frailty secondary to his chronic medical conditions-he continues to require 24/7 care/support of his ADLs, constipation secondary to chronic opioid use-nursing reports doing well with routine MiraLax and Senokot S, and lower extremity edema-continues but stable with furosemide  Nursing staff reports that his appetite is good, that he is sleeping well, and that he is having no difficulty with constipation  He requires total assistance with his ADLs except for feeding  Nursing reports that he is not exhibiting any behaviors that are distressing to him or disruptive to the nursing unit  He denies having any new troubles  He endorses that his pain is under control  He denies chest pain and shortness of breath  He reports using his CPAP machine on a regular basis  The following portions of the patient's history were reviewed and updated as appropriate: current medications, past family history, past medical history, past social history, past surgical history and problem list     Allergies:  No Known Allergies    Review of Systems     Review of Systems   Respiratory: Negative for shortness of breath  Cardiovascular: Negative for chest pain  Medications and orders     All medications reviewed and updated in Nursing Home EMR  Objective     Vitals:  Monthly vitals:  Weight 209 lb (decreased 15 lb in the last 2 months), pulse 50, blood pressure 115/61  Physical Exam  Vitals reviewed  Constitutional:       General: He is not in acute distress  Appearance: He is well-developed  He is not toxic-appearing or diaphoretic        Comments: He appears comfortable lying in bed, his stated age, and frail  Cardiovascular:      Rate and Rhythm: Normal rate and regular rhythm  Heart sounds: Normal heart sounds  No murmur heard  No friction rub  No gallop  Comments: Moderate amount of bilateral lower extremity edema, but appears improved  Pulmonary:      Effort: Pulmonary effort is normal  No respiratory distress  Breath sounds: Normal breath sounds  No stridor  No wheezing, rhonchi or rales  Neurological:      Mental Status: He is alert  Psychiatric:         Mood and Affect: Mood normal          Behavior: Behavior normal  Behavior is cooperative  Pertinent Laboratory/Diagnostic Studies: The following labs were reviewed please see chart or hospital paperwork for details  September 8, 2022:     CBC with diff:  WBC count 6 6, hemoglobin 12 6, hematocrit 30 1, platelet count [id number], MCV 86    - His order sheet was reviewed and signed  Portions of the record may have been created with voice recognition software  Occasional wrong word or "sound a like" substitutions may have occurred due to the inherent limitations of voice recognition software  Read the chart carefully and recognize, using context, where substitutions have occurred      ALMA Mcrae   10/10/2022 9:56 PM

## 2022-10-11 NOTE — ASSESSMENT & PLAN NOTE
· He reports that his pain is under control with current pregabalin and oxycodone regimen  · Will continue with this treatment regimen   · Will continue with monitoring for change in his condition

## 2022-10-11 NOTE — ASSESSMENT & PLAN NOTE
· Secondary to his chronic medical conditions   · He continues to require 24/7 care/support of his ADLs   · He is level 7 on the clinical frailty scale consistent with being severely frail   · I recommend continued care/support of his ADLs as a long-term resident at the nursing facility   · Will continue to monitor for change in his condition

## 2022-10-29 DIAGNOSIS — I69.398 PAIN AFTER CEREBROVASCULAR ACCIDENT (CVA): ICD-10-CM

## 2022-10-29 DIAGNOSIS — R52 PAIN AFTER CEREBROVASCULAR ACCIDENT (CVA): ICD-10-CM

## 2022-10-29 RX ORDER — OXYCODONE HYDROCHLORIDE 5 MG/1
5 TABLET ORAL DAILY
Qty: 90 TABLET | Refills: 0 | Status: SHIPPED | OUTPATIENT
Start: 2022-10-29

## 2022-10-29 NOTE — TELEPHONE ENCOUNTER
1  Refill request from nursing staff at his facility processed  2  Ninety tablets prescribed for a 3 month supply

## 2022-11-21 ENCOUNTER — TELEPHONE (OUTPATIENT)
Dept: VASCULAR SURGERY | Facility: CLINIC | Age: 72
End: 2022-11-21

## 2022-12-19 ENCOUNTER — NURSING HOME VISIT (OUTPATIENT)
Dept: GERIATRICS | Facility: OTHER | Age: 72
End: 2022-12-19

## 2022-12-19 DIAGNOSIS — E87.6 DRUG-INDUCED HYPOKALEMIA: ICD-10-CM

## 2022-12-19 DIAGNOSIS — R60.0 LOWER EXTREMITY EDEMA: ICD-10-CM

## 2022-12-19 DIAGNOSIS — E11.40 TYPE 2 DIABETES MELLITUS WITH DIABETIC NEUROPATHY, WITHOUT LONG-TERM CURRENT USE OF INSULIN (HCC): ICD-10-CM

## 2022-12-19 DIAGNOSIS — T50.905A DRUG-INDUCED HYPOKALEMIA: ICD-10-CM

## 2022-12-19 DIAGNOSIS — I10 HYPERTENSION, UNSPECIFIED TYPE: Primary | ICD-10-CM

## 2022-12-19 DIAGNOSIS — I69.354 HEMIPARESIS AFFECTING LEFT SIDE AS LATE EFFECT OF CEREBROVASCULAR ACCIDENT (HCC): ICD-10-CM

## 2022-12-19 DIAGNOSIS — Z78.9 FULL CODE STATUS: ICD-10-CM

## 2022-12-19 DIAGNOSIS — R25.1 TREMOR: ICD-10-CM

## 2022-12-19 DIAGNOSIS — K59.03 DRUG-INDUCED CONSTIPATION: ICD-10-CM

## 2022-12-19 DIAGNOSIS — I69.30 HISTORY OF HEMORRHAGIC CEREBROVASCULAR ACCIDENT (CVA) WITH RESIDUAL DEFICIT: ICD-10-CM

## 2022-12-19 DIAGNOSIS — R54 FRAILTY SYNDROME IN GERIATRIC PATIENT: ICD-10-CM

## 2023-01-05 ENCOUNTER — OFFICE VISIT (OUTPATIENT)
Dept: VASCULAR SURGERY | Facility: CLINIC | Age: 73
End: 2023-01-05

## 2023-01-05 VITALS
HEART RATE: 69 BPM | SYSTOLIC BLOOD PRESSURE: 136 MMHG | WEIGHT: 278.5 LBS | OXYGEN SATURATION: 95 % | DIASTOLIC BLOOD PRESSURE: 74 MMHG | HEIGHT: 66 IN | BODY MASS INDEX: 44.76 KG/M2

## 2023-01-05 DIAGNOSIS — I69.354 HEMIPARESIS AFFECTING LEFT SIDE AS LATE EFFECT OF CEREBROVASCULAR ACCIDENT (HCC): ICD-10-CM

## 2023-01-05 DIAGNOSIS — I89.0 LYMPHEDEMA: ICD-10-CM

## 2023-01-05 DIAGNOSIS — R60.0 LOWER EXTREMITY EDEMA: Primary | ICD-10-CM

## 2023-01-05 NOTE — LETTER
January 5, 2023     [facility]  [address] [id number]    Patient: Himanshu Funez   YOB: 1950   Date of Visit: 1/5/2023       Dear Dr James Woodall: Thank you for referring Jennifer Rivera to me for evaluation  Below are the relevant portions of my assessment and plan of care  Lower extremity edema  Chronic bilateral lower extremity edema in obese patient with history of left sided stroke and nonambulatory for over 5 years  This is asymptomatic and uncomplicated without any associated symptoms or history of ulceration  We discussed the importance of compressive therapy and the advantages of compressive stockings over Ace wraps  He was given a prescription for 20-30 mm knee-high stockings  No further vascular work-up or intervention is necessary  If you have questions, please do not hesitate to call me  I look forward to following [facility] along with you           Sincerely,        Garret Haile MD        CC: No Recipients
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
## 2023-01-05 NOTE — ASSESSMENT & PLAN NOTE
Chronic bilateral lower extremity edema in obese patient with history of left sided stroke and nonambulatory for over 5 years  This is asymptomatic and uncomplicated without any associated symptoms or history of ulceration  We discussed the importance of compressive therapy and the advantages of compressive stockings over Ace wraps  He was given a prescription for 20-30 mm knee-high stockings  No further vascular work-up or intervention is necessary  No

## 2023-01-05 NOTE — PATIENT INSTRUCTIONS
Lower extremity edema  Chronic bilateral lower extremity edema in obese patient with history of left sided stroke and nonambulatory for over 5 years  This is asymptomatic and uncomplicated without any associated symptoms or history of ulceration  We discussed the importance of compressive therapy and the advantages of compressive stockings over Ace wraps  He was given a prescription for 20-30 mm knee-high stockings  No further vascular work-up or intervention is necessary

## 2023-01-22 PROBLEM — R54 FRAILTY SYNDROME IN GERIATRIC PATIENT: Chronic | Status: ACTIVE | Noted: 2021-12-14

## 2023-01-22 PROBLEM — I69.30 HISTORY OF HEMORRHAGIC CEREBROVASCULAR ACCIDENT (CVA) WITH RESIDUAL DEFICIT: Chronic | Status: ACTIVE | Noted: 2019-06-18

## 2023-01-22 PROBLEM — I10 HYPERTENSION: Chronic | Status: ACTIVE | Noted: 2019-06-18

## 2023-01-22 PROBLEM — I69.354 HEMIPARESIS AFFECTING LEFT SIDE AS LATE EFFECT OF CEREBROVASCULAR ACCIDENT (HCC): Chronic | Status: ACTIVE | Noted: 2019-08-07

## 2023-01-22 PROBLEM — E11.9 TYPE 2 DIABETES MELLITUS, WITHOUT LONG-TERM CURRENT USE OF INSULIN (HCC): Chronic | Status: ACTIVE | Noted: 2019-08-07

## 2023-01-22 PROBLEM — R25.1 TREMOR: Status: ACTIVE | Noted: 2023-01-22

## 2023-01-22 PROBLEM — E87.6 DRUG-INDUCED HYPOKALEMIA: Chronic | Status: ACTIVE | Noted: 2022-08-11

## 2023-01-22 PROBLEM — K59.03 DRUG-INDUCED CONSTIPATION: Chronic | Status: ACTIVE | Noted: 2019-08-07

## 2023-01-22 PROBLEM — R60.0 LOWER EXTREMITY EDEMA: Chronic | Status: ACTIVE | Noted: 2019-06-18

## 2023-01-22 PROBLEM — T50.905A DRUG-INDUCED HYPOKALEMIA: Chronic | Status: ACTIVE | Noted: 2022-08-11

## 2023-01-23 NOTE — ASSESSMENT & PLAN NOTE
· April 21, 2022: Hemoglobin A1c:  6%,   · November 10, 2022: Hemoglobin A1c: 5 7%,   · He is at goal of hemoglobin A1c less than 7 5% with TLC  · We will continue with clinical and periodic laboratory monitoring for change in his condition

## 2023-01-23 NOTE — ASSESSMENT & PLAN NOTE
· Secondary to his chronic medical conditions  · He continues to require 24/7 care/support of his ADLs  · He is level 7 on the clinical frailty scale consistent with being severely frail  · I recommend continued care/support of his ADLs as a long-term resident at the nursing facility  · We will continue to monitor for change in his condition

## 2023-01-23 NOTE — PROGRESS NOTES
[address]  [address] [place] [address]  Facility: [address] [facility] and [address] [facility]  32  Follow-up visit    NAME: Ying Miramontes  AGE: 67 y o  SEX: male    DATE OF ENCOUNTER: 12/19/2022    Code status:  CPR    Assessment and Plan     1  Hypertension, unspecified type  Assessment & Plan:  · Review of his BP and AP logs as well as labetalol, lisinopril, hydralazine, and amlodipine  · Plan is to continue with his medication regimen  · We will continue with clinical and periodic laboratory monitoring for change in his condition      2  Lower extremity edema  Assessment & Plan:  · He reports doing well with current furosemide and lower extremity wrapping  · We will continue with this care plan  · Will continue with monitoring for change in His condition      3  Drug-induced hypokalemia  Assessment & Plan:  · Secondary to chronic diuretic use for lower extremity edema  · He is doing well with oral potassium supplement  · We will continue with periodic laboratory monitoring for change in his condition      4  Frailty syndrome in geriatric patient  Assessment & Plan:  · Secondary to his chronic medical conditions  · He continues to require 24/7 care/support of his ADLs  · He is level 7 on the clinical frailty scale consistent with being severely frail  · I recommend continued care/support of his ADLs as a long-term resident at the nursing facility  · We will continue to monitor for change in his condition      5  Tremor  Assessment & Plan:  · Question intention and/or parkinsonism  · We will have him follow-up with his neurology service for further evaluation  · We will continue with monitoring for change in his condition      6   Hemiparesis affecting left side as late effect of cerebrovascular accident [facility])  Assessment & Plan:  · We will continue with care/support of his ADLs  · Will continue with secondary stroke prevention  · We will continue with monitoring for change in his condition      7  History of hemorrhagic cerebrovascular accident (CVA) with residual deficit  Assessment & Plan:  · We will continue with treatment of his hypertension  · We will continue to monitor for change in his condition      8  Type 2 diabetes mellitus with diabetic neuropathy, without long-term current use of insulin [facility])  Assessment & Plan:  · April 21, 2022: Hemoglobin A1c:  6%,   · November 10, 2022: Hemoglobin A1c: 5 7%,   · He is at goal of hemoglobin A1c less than 7 5% with TLC  · We will continue with clinical and periodic laboratory monitoring for change in his condition      9  Drug-induced constipation  Assessment & Plan:  · Secondary to chronic opioid use for pain after his CVA  · Nursing reports doing well with routine Senokot-S and MiraLAX  · Plans to continue with this medication regimen  · We will continue with monitoring for change in his condition      10  Full code status    See my note of October 10, 2022 for further information  All medications and routine orders were reviewed and updated as needed  Plan discussed with: Patient and nursing staff  Chief Complaint     He is seen for a follow-up visit to update the care and treatment of his hypertension, lower extremity edema, drug-induced hypokalemia, and frailty secondary to his chronic medical conditions  History of Present Illness     He is a 71-year-old gentleman who is seen for a follow-up visit to update the care and treatment of his hypertension-doing well with lisinopril, hydralazine, labetalol, and amlodipine, lower extremity edema-doing well with furosemide and wrapping, drug-induced hyperkalemia-doing well with oral potassium supplement, and frailty secondary to his chronic medical conditions-he continues to require 24/7 care/support of his ADLs  He denies chest pain and shortness of breath  He reports that his leg swelling is better with wrapping    He reports having a tremor with movement and at rest     Nursing reports his overall status is stable  Nursing reports that his appetite is good, that he is sleeping well, and that he is having no difficulty with constipation  He requires assistance with his ADL care  The following portions of the patient's history were reviewed and updated as appropriate: current medications, past family history, past medical history, past social history, past surgical history and problem list     Allergies:  No Known Allergies    Review of Systems     Review of Systems   Respiratory: Negative for shortness of breath  Cardiovascular: Negative for chest pain  Neurological: Positive for tremors (See HPI)  Medications and orders     All medications reviewed and updated in Nursing Home EMR  Objective     Vitals: Month vitals: Weight 288 pounds, pulse 60, blood pressure 122/68, fasting fingerstick blood sugar 120  Physical Exam  Vitals reviewed  Constitutional:       General: He is awake  He is not in acute distress  Appearance: He is well-developed  He is not toxic-appearing or diaphoretic  Comments: He appears comfortable lying in bed, his stated age, and frail  Neurological:      Mental Status: He is alert  Comments: Mild cogwheeling of right upper extremity  Possible masked facies  Psychiatric:         Mood and Affect: Mood normal          Behavior: Behavior normal  Behavior is cooperative  Pertinent Laboratory/Diagnostic Studies: The following labs were reviewed please see chart or hospital paperwork for details  November 10, 2022:     CMP: Sodium 143, potassium 3 7, BUN 18, creatinine 0 82, fasting blood sugar 96, EGFR 93, LFTs within normal limits except albumin 2 9, total protein 6, calcium 7 9 (corrected calcium 8 8 (N))    Fasting lipid profile:  Total cholesterol 86, triglycerides 90, HDL 30, LDL 38    TSH: 1 77    Hemoglobin A1c: 5 7%,     - His order summary was reviewed and signed  Portions of the record may have been created with voice recognition software  Occasional wrong word or "sound a like" substitutions may have occurred due to the inherent limitations of voice recognition software  Read the chart carefully and recognize, using context, where substitutions have occurred      ALMA Beatty   1/22/2023 11:20 PM

## 2023-01-23 NOTE — ASSESSMENT & PLAN NOTE
· Secondary to chronic opioid use for pain after his CVA  · Nursing reports doing well with routine Senokot-S and MiraLAX  · Plans to continue with this medication regimen  · We will continue with monitoring for change in his condition

## 2023-01-23 NOTE — ASSESSMENT & PLAN NOTE
· We will continue with treatment of his hypertension  · We will continue to monitor for change in his condition

## 2023-01-23 NOTE — ASSESSMENT & PLAN NOTE
· We will continue with care/support of his ADLs  · Will continue with secondary stroke prevention  · We will continue with monitoring for change in his condition

## 2023-01-23 NOTE — ASSESSMENT & PLAN NOTE
· Secondary to chronic diuretic use for lower extremity edema  · He is doing well with oral potassium supplement  · We will continue with periodic laboratory monitoring for change in his condition

## 2023-01-23 NOTE — ASSESSMENT & PLAN NOTE
· Review of his BP and AP logs as well as labetalol, lisinopril, hydralazine, and amlodipine  · Plan is to continue with his medication regimen  · We will continue with clinical and periodic laboratory monitoring for change in his condition

## 2023-01-23 NOTE — ASSESSMENT & PLAN NOTE
· Question intention and/or parkinsonism  · We will have him follow-up with his neurology service for further evaluation  · We will continue with monitoring for change in his condition

## 2023-01-23 NOTE — ASSESSMENT & PLAN NOTE
· He reports doing well with current furosemide and lower extremity wrapping  · We will continue with this care plan  · Will continue with monitoring for change in His condition

## 2023-01-30 DIAGNOSIS — R52 PAIN AFTER CEREBROVASCULAR ACCIDENT (CVA): ICD-10-CM

## 2023-01-30 DIAGNOSIS — I69.398 PAIN AFTER CEREBROVASCULAR ACCIDENT (CVA): ICD-10-CM

## 2023-01-30 RX ORDER — PREGABALIN 50 MG/1
50 CAPSULE ORAL DAILY
Qty: 30 CAPSULE | Refills: 3 | Status: SHIPPED | OUTPATIENT
Start: 2023-01-30

## 2023-02-14 DIAGNOSIS — I69.398 PAIN AFTER CEREBROVASCULAR ACCIDENT (CVA): ICD-10-CM

## 2023-02-14 DIAGNOSIS — R52 PAIN AFTER CEREBROVASCULAR ACCIDENT (CVA): ICD-10-CM

## 2023-02-14 RX ORDER — OXYCODONE HYDROCHLORIDE 5 MG/1
5 TABLET ORAL DAILY
Qty: 60 TABLET | Refills: 0 | Status: SHIPPED | OUTPATIENT
Start: 2023-02-14

## 2023-02-20 ENCOUNTER — NURSING HOME VISIT (OUTPATIENT)
Dept: GERIATRICS | Facility: OTHER | Age: 73
End: 2023-02-20

## 2023-02-20 DIAGNOSIS — E11.40 TYPE 2 DIABETES MELLITUS WITH DIABETIC NEUROPATHY, WITHOUT LONG-TERM CURRENT USE OF INSULIN (HCC): Primary | Chronic | ICD-10-CM

## 2023-02-20 DIAGNOSIS — R60.0 LOWER EXTREMITY EDEMA: Chronic | ICD-10-CM

## 2023-02-20 DIAGNOSIS — R52 PAIN AFTER CEREBROVASCULAR ACCIDENT (CVA): ICD-10-CM

## 2023-02-20 DIAGNOSIS — I10 HYPERTENSION, UNSPECIFIED TYPE: Chronic | ICD-10-CM

## 2023-02-20 DIAGNOSIS — I69.398 PAIN AFTER CEREBROVASCULAR ACCIDENT (CVA): ICD-10-CM

## 2023-02-20 DIAGNOSIS — I69.30 HISTORY OF HEMORRHAGIC CEREBROVASCULAR ACCIDENT (CVA) WITH RESIDUAL DEFICIT: Chronic | ICD-10-CM

## 2023-02-22 RX ORDER — FLUOXETINE 10 MG/1
10 CAPSULE ORAL DAILY
COMMUNITY
Start: 2023-01-10

## 2023-02-22 RX ORDER — FLUOXETINE HYDROCHLORIDE 20 MG/1
20 CAPSULE ORAL DAILY
COMMUNITY
Start: 2023-01-10

## 2023-02-22 NOTE — ASSESSMENT & PLAN NOTE
Lab Results   Component Value Date    HGBA1C 5 7 (H) 11/10/2022   Goal: < 7% (per neurology)  On monthly FBG checks  FBG range (Dec, 2022 to Feb, 2023) = 120 to 153  Not on antihyperglycemic medication

## 2023-02-22 NOTE — ASSESSMENT & PLAN NOTE
Patient reported well controlled pain today  Continue with current pain meds:  * Lyrica BID [50mg in AM/ 75mg at HS]  * PRN Oxycodone 5mg daily PRN

## 2023-02-22 NOTE — ASSESSMENT & PLAN NOTE
Goal: < 130-90 (per neurology)  On weekly BP/HR checks  BP range (Jan-Feb, 2023) = 110/57 to 128/68  HR range (Jan-Feb, 2023) = 51 to 68/min  Non ambulatory  Continue the following meds:   * Hydralazine 25mg QID  * Amlodipine 5mg daily  * Lisinopril 40mg daily  * Labetalol 50 mg every 12 hours

## 2023-02-22 NOTE — ASSESSMENT & PLAN NOTE
SBP and HR stable and controlled  Nonambulatory with Left sided weakness  Lipid panel WNL (Nov, 2022) except for slightly low HDL  Continue Atorvastatin 10 mg daily  Followed and managed by [facility]  Last seen on Dec, 2022     Goal set:  * LDL less than 170  * HbA1C less than 7%  * SBP less then 130/90  Continue LCTF supportive care

## 2023-02-22 NOTE — PROGRESS NOTES
[address], [address] [place] [address]  [phone]    NAME: Bj Tate  AGE: 67 y o  SEX: male    Progress Note    Location: [facility]  POS: 28 (LTC)    Assessment/Plan:    Type 2 diabetes mellitus, without long-term current use of insulin (HCC)    Lab Results   Component Value Date    HGBA1C 5 7 (H) 11/10/2022   Goal: < 7% (per neurology)  On monthly FBG checks  FBG range (Dec, 2022 to Feb, 2023) = 120 to 153  Not on antihyperglycemic medication    Hypertension  Goal: < 130-90 (per neurology)  On weekly BP/HR checks  BP range (Jan-Feb, 2023) = 110/57 to 128/68  HR range (Jan-Feb, 2023) = 51 to 68/min  Non ambulatory  Continue the following meds: * Hydralazine 25mg QID  * Amlodipine 5mg daily  * Lisinopril 40mg daily  * Labetalol 50 mg every 12 hours    History of hemorrhagic cerebrovascular accident (CVA) with residual deficit  SBP and HR stable and controlled  Nonambulatory with Left sided weakness  Lipid panel WNL (Nov, 2022) except for slightly low HDL  Continue Atorvastatin 10 mg daily  Followed and managed by [address] [facility] Neurology  Last seen on Dec, 2022  Goal set:  * LDL less than 170  * HbA1C less than 7%  * SBP less then 130/90  Continue [facility] supportive care    Lower extremity edema  Evaluated by [facility] V[facility] Sx office on 1/5/2023:  = recommended compression stockings (knee high) 20-30mmHg  = No further intervention  Continue Furosemide 40mg daily  Non ambulatory  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)  Patient reported well controlled pain today  Continue with current pain meds:  * Lyrica BID [50mg in AM/ 75mg at HS]  * PRN Oxycodone 5mg daily PRN      Chief complaint / Reason for visit: Follow- visit    History of Present Illness: This is a 18-year-old male patient admitted at [facility] for debility  Patient is seen and examined today to follow-up acute on chronic medical conditions: DM Type 2, HTN, Hx of CVA, B/L LE edema, Chronic pain post CVA      Patient is in bed for this visit -alert, cooperative, calm, pleasant and not in distress  Patient is verbal with clear coherent speech -oriented to name/birthday/current date only  Patient acknowledged feeling well on this visit - " I'm alright" -denies any acute medical concerns during ROS assessment including pain  Per nursing, no acute medical concerns with this visit as well  Nursing and prior provider notes reviewed on this visit  Discussed visit with PCP and nursing staff/ supervisor  Review of Systems:  Per history of present illness, all other systems reviewed and negative  HISTORY:  Medical Hx: Reviewed, unchanged  Family Hx: Reviewed, unchanged  Soc Hx: Reviewed,  unchanged    ALLERGY: Reviewed, unchanged  No Known Allergies     PHYSICAL EXAM:  Vital Signs: T98 0F -P68 -R16 BP: 122/60 SpO2: 95% RA  Weight: 271 0 lbs (2/19/2023) <= 276 0 lbs (2/16/2023) <= 267 5 lbs (2/12/2023)    General: NAD  Well appearing  No acute distress  With obesity  Head: Atraumatic  Normocephalic  Eye Exam: anicteric sclera, no discharge, PERRLA, No injection  Oral Exam: moist mucous membranes, no buccaloropharyngeal erythema, palatine tonsils WNL  Neck Exam: no anterior cervical lymphadenopathy noted, neck supple  Trachea midline, no carotid bruit, no masses  Cardiovascular: regular rate, irregular rhythm, no murmurs, rubs, or gallops  S1 and S2  Muted heart sounds - likely habitus  Pulmonary: no wheeze, no rhonchi, no rales  No chest tenderness  Normal chest wall expansion  Abdominal: soft, non-tender, nondistended, bowel sounds audible x 4 quadrants  No palpable hepatosplenomegaly, no tympany  : Non distended bladder  Continent  Extremities and skin: (+2) B/L LE edema with stasis dermatitis  Intact skin  Neurological: alert, cooperative and responsive, Oriented x 3  Cranial Nerves II-XII grossly intact, no tics, normal sensation to pressure and light touch  Left hemiparesis  Mostly bed bound per nursing   Fine tremors to RUE  LUE weakness  Laboratory results / Imaging reviewed: Hard copy/ies in medical chart:    * CMP (11/10/2022) = WNL except:  Co: 33 (H)  Ca: 7 9 (L)  Corrected Ca: 8 78 mg/dL  Alb: 2 9 (L)  TPro: 6 0 (L)    * Lipid testing (11/10/2022) = WN: except:  HDL: 30 (L)    * TSH (11/20/2022) = 1 77 (WNL)    Current Medications: All medications reviewed and updated in Nursing Home Chart    Please note: This note was completed in part utilizing a voice-recognition software may have been used in the preparation of this document  Grammatical errors, random word insertion, spelling mistakes, and incomplete sentences may be an occasional consequence of the system secondary to software limitations, ambient noise and hardware issues  Occasional wrong word or "sound-alike" substitutions may have occurred due to the inherent limitations of voice recognition software  At the time of dictation, efforts were made to edit, clarify and/or correct errors  Interpretation should be guided by context  Please read the chart carefully and recognize, using context, where substitutions have occurred  If you have any questions or concerns about the context, text or information contained within the body of this dictation, please contact myself, the provider, for further clarification        KSENIA Mason  2/22/2023

## 2023-03-09 NOTE — PROGRESS NOTES
Continue Crestor [facility]  [address] [place]  [phone]  [place] Acute Note        NAME: Tad Tapia  AGE: 66 y o  SEX: male    DATE OF ENCOUNTER: 2/18/22    Assessment and Plan     1  Debility  Assessment & Plan:  · Currently at LTCF  · Does not get out of bed  · Continue to shift weight as much as possible  · Assist with adl and iadl  · Fall precautions      2  Lower extremity edema  Assessment & Plan:  · Increased BLLE edema  · With some blistering  · Pt refuses to wear compression   · Continue to monitor for any draining  · Increase lasix x 3 days  · Monitor for changes  · Labs in 3 days to monitor renal function          No orders of the defined types were placed in this encounter  History of Present Illness     Crystal Mulligan 66 y o  male seen for follow-up of care and treatment plan for ambulatory dysfunction doing well at LTCF, increased edema BLLE controlled with lasix     The following portions of the patient's history were reviewed and updated as appropriate: allergies, current medications, past family history, past medical history, past social history, past surgical history and problem list     Review of Systems     Review of Systems   Constitutional: Negative for chills and fever  HENT: Negative for ear pain and sore throat  Eyes: Negative for pain and visual disturbance  Respiratory: Negative for cough and shortness of breath  Cardiovascular: Negative for chest pain and palpitations  Leg swelling: +3    Gastrointestinal: Negative for abdominal pain and vomiting  Genitourinary: Negative for dysuria and hematuria  Musculoskeletal: Positive for gait problem  Negative for arthralgias and back pain  Skin: Negative for color change and rash  Neurological: Positive for weakness  Negative for seizures and syncope  All other systems reviewed and are negative        Objective     /74   Pulse 74   Temp 98 3 °F (36 8 °C)   Resp 20 Wt (!) 147 kg (324 lb)   BMI 50 00 kg/m²     Physical Exam  Vitals reviewed  Constitutional:       Appearance: He is well-developed  HENT:      Head: Normocephalic and atraumatic  Eyes:      Conjunctiva/sclera: Conjunctivae normal    Cardiovascular:      Rate and Rhythm: Normal rate and regular rhythm  Heart sounds: Normal heart sounds, S1 normal and S2 normal  No murmur heard  Pulmonary:      Effort: Pulmonary effort is normal  No respiratory distress  Breath sounds: Normal breath sounds  No wheezing  Abdominal:      General: Bowel sounds are normal  There is no distension  Palpations: Abdomen is soft  Tenderness: There is no abdominal tenderness  Musculoskeletal:         General: Normal range of motion  Cervical back: Normal range of motion  Right lower leg: 3+ Edema present  Left lower leg: 3+ Edema present  Comments: Generalized weakness   Skin:     General: Skin is warm and dry  Neurological:      Mental Status: He is alert  Psychiatric:         Attention and Perception: Attention normal          Mood and Affect: Mood normal          Speech: Speech normal          Behavior: Behavior normal          Thought Content: Thought content normal          Pertinent Laboratory/Diagnostic Studies:  [facility] Labs Reviewed    Current Medications   Medication list reviewed and updated today  Please see paper chart for updated medication list      Escobar Crawford  :18 PM      Name: Portage Ally  : 1950  MRN: [id number]  DOS: 21    Diagnoses:   Diagnosis ICD-10-CM Associated Orders   1  Debility  R53 81    2   Lower extremity edema  R60 0

## 2023-04-27 ENCOUNTER — NURSING HOME VISIT (OUTPATIENT)
Dept: GERIATRICS | Facility: OTHER | Age: 73
End: 2023-04-27

## 2023-04-27 DIAGNOSIS — E11.9 TYPE 2 DIABETES MELLITUS WITHOUT COMPLICATION, WITHOUT LONG-TERM CURRENT USE OF INSULIN (HCC): Chronic | ICD-10-CM

## 2023-04-27 DIAGNOSIS — R60.0 LOWER EXTREMITY EDEMA: Chronic | ICD-10-CM

## 2023-04-27 DIAGNOSIS — I69.354 HEMIPARESIS AFFECTING LEFT SIDE AS LATE EFFECT OF CEREBROVASCULAR ACCIDENT (HCC): Chronic | ICD-10-CM

## 2023-04-27 DIAGNOSIS — T50.905A DRUG-INDUCED HYPOKALEMIA: Chronic | ICD-10-CM

## 2023-04-27 DIAGNOSIS — Z78.9 FULL CODE STATUS: ICD-10-CM

## 2023-04-27 DIAGNOSIS — I69.398 PAIN AFTER CEREBROVASCULAR ACCIDENT (CVA): ICD-10-CM

## 2023-04-27 DIAGNOSIS — I69.30 HISTORY OF HEMORRHAGIC CEREBROVASCULAR ACCIDENT (CVA) WITH RESIDUAL DEFICIT: Chronic | ICD-10-CM

## 2023-04-27 DIAGNOSIS — R52 PAIN AFTER CEREBROVASCULAR ACCIDENT (CVA): ICD-10-CM

## 2023-04-27 DIAGNOSIS — G47.33 OBSTRUCTIVE SLEEP APNEA: ICD-10-CM

## 2023-04-27 DIAGNOSIS — K59.03 DRUG-INDUCED CONSTIPATION: Primary | Chronic | ICD-10-CM

## 2023-04-27 DIAGNOSIS — R54 FRAILTY SYNDROME IN GERIATRIC PATIENT: Chronic | ICD-10-CM

## 2023-04-27 DIAGNOSIS — E87.6 DRUG-INDUCED HYPOKALEMIA: Chronic | ICD-10-CM

## 2023-04-27 DIAGNOSIS — I10 HYPERTENSION, UNSPECIFIED TYPE: Chronic | ICD-10-CM

## 2023-04-27 DIAGNOSIS — E78.5 HYPERLIPIDEMIA, UNSPECIFIED HYPERLIPIDEMIA TYPE: ICD-10-CM

## 2023-04-30 NOTE — ASSESSMENT & PLAN NOTE
· Intracranial hemorrhage of right basal ganglia on June 7, 2017  · We will continue with secondary stroke prevention, including blood pressure control  · We will continue with 24/7 care/support of his ADLs  · We will continue with monitoring for change in his condition

## 2023-04-30 NOTE — ASSESSMENT & PLAN NOTE
· October 8, 2014:  Hemoglobin A1c:  6 8%,    · April 21, 2021: Hemoglobin A1c:  5 9%  · December 15, 2021: Hemoglobin A1c:  6%,   · April 21, 2022: Hemoglobin A1c:  6%,   · November 10, 2022: Hemoglobin A1c: 5 7%,   · His fingerstick blood sugar log looks well with TLC  · We will continue without medication and periodic laboratory monitoring for change in his condition

## 2023-04-30 NOTE — ASSESSMENT & PLAN NOTE
· We will continue with secondary stroke prevention  · We will continue with 24/7 care/support of his ADLs  · We will continue with monitoring for change in his condition

## 2023-04-30 NOTE — ASSESSMENT & PLAN NOTE
· Secondary to chronic diuretic use  · He is doing well with oral potassium replacement therapy  · We will continue with monitoring for change in his condition

## 2023-04-30 NOTE — ASSESSMENT & PLAN NOTE
· His BP and AP logs look well with amlodipine, hydralazine, lisinopril, furosemide, and labetalol  · We will continue with this care plan  · We will continue with clinical and periodic laboratory monitoring for change in his condition

## 2023-04-30 NOTE — ASSESSMENT & PLAN NOTE
· Nursing staff reports that he is doing well with routine Senokot-S and MiraLAX  · We will continue with this care plan  · We will continue with monitoring, as he is trialing without routine oxycodone

## 2023-04-30 NOTE — PROGRESS NOTES
[address]  [address]  Facility: [facility] and [facility]  32  Follow-up visit    NAME: Sue Winchester  AGE: 67 y o  SEX: male    DATE OF ENCOUNTER: 4/27/2023    Code status:  CPR    Assessment and Plan     1  Drug-induced constipation  Assessment & Plan:  · Nursing staff reports that he is doing well with routine Senokot-S and MiraLAX  · We will continue with this care plan  · We will continue with monitoring, as he is trialing without routine oxycodone      2  Hypertension, unspecified type  Assessment & Plan:  · His BP and AP logs look well with amlodipine, hydralazine, lisinopril, furosemide, and labetalol  · We will continue with this care plan  · We will continue with clinical and periodic laboratory monitoring for change in his condition      3  Frailty syndrome in geriatric patient  Assessment & Plan:  · Secondary to his chronic medical conditions  · He continues to require 24/7 care/support of his ADLs  · I recommend continued care/support of his ADLs as a long-term resident at the nursing facility  · We will continue to monitor for change in his condition      4  Lower extremity edema  Assessment & Plan:  · He is doing well with furosemide and daily wrapping  · We will continue with this care plan  · We will continue with monitoring for change in his condition      5  Drug-induced hypokalemia  Assessment & Plan:  · Secondary to chronic diuretic use  · He is doing well with oral potassium replacement therapy  · We will continue with monitoring for change in his condition      6  Hyperlipidemia, unspecified hyperlipidemia type  Assessment & Plan:  · He is doing well with atorvastatin  · We will target an LDL less than 70 (but greater than 40) given history of CVA and diabetes  · We will continue with periodic laboratory monitoring for change in his condition      7   Type 2 diabetes mellitus without complication, without long-term current use of insulin [facility])  Assessment & Plan:  · October 8, 2014:  Hemoglobin A1c:  6 8%,    · April 21, 2021: Hemoglobin A1c:  5 9%  · December 15, 2021: Hemoglobin A1c:  6%,   · April 21, 2022: Hemoglobin A1c:  6%,   · November 10, 2022: Hemoglobin A1c: 5 7%,   · His fingerstick blood sugar log looks well with TLC  · We will continue without medication and periodic laboratory monitoring for change in his condition      8  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)  Assessment & Plan:  · Left-sided: Leg greater than arm  · He reports that his pain is under good control with his current pregabalin  · In shared decision-making, will trial without routine oxycodone  · We will continue with monitoring for change in his condition      9  Hemiparesis affecting left side as late effect of cerebrovascular accident [facility])  Assessment & Plan:  · We will continue with secondary stroke prevention  · We will continue with 24/7 care/support of his ADLs  · We will continue with monitoring for change in his condition      10  History of hemorrhagic cerebrovascular accident (CVA) with residual deficit  Assessment & Plan:  · Intracranial hemorrhage of right basal ganglia on June 7, 2017  · We will continue with secondary stroke prevention, including blood pressure control  · We will continue with 24/7 care/support of his ADLs  · We will continue with monitoring for change in his condition      11  Obstructive sleep apnea  Assessment & Plan:  · He reports having difficulty with his CPAP mask fitting  · He is working with the facility respiratory therapist in this regard  · We will continue with monitoring for change in his condition      12  Full code status    See my note of December 19, 2022 for further information  All medications and routine orders were reviewed and updated as needed  Plan discussed with: Patient and nursing staff      Chief Complaint     He is seen for a follow-up visit to update the care and treatment of his chronic medical conditions  History of Present Illness     He is a pleasant 21-year-old gentleman who is seen for a follow-up visit to update the care and treatment of his chronic medical conditions, including hypertension, frailty secondary to his chronic medical conditions, drug-induced constipation, and lower extremity edema  He denies chest pain and shortness of breath  He reports that his leg swelling is under good control with wrapping  He reports not liking the food at the facility  Nursing staff reports his overall condition is stable, that his appetite is stable, and that he is having no difficulty with constipation  He and staff note that he is having trouble with his CPAP mask fitting  He is working with the facility respiratory service  The following portions of the patient's history were reviewed and updated as appropriate: current medications, past family history, past medical history, past social history, past surgical history and problem list     Allergies:  No Known Allergies    Review of Systems     Review of Systems   Respiratory: Negative for shortness of breath  Cardiovascular: Negative for chest pain  Psychiatric/Behavioral: Positive for sleep disturbance (See HPI)  Medications and orders     All medications reviewed and updated in Nursing Home EMR  Objective     Vitals: Monthly vitals: Weight 272 4 pounds, pulse 54, blood pressure 112/57, fasting fingerstick blood sugar 98  Review of his BP, AP, and fingerstick blood sugar logs looks well  Physical Exam  Vitals reviewed  Constitutional:       General: He is awake  He is not in acute distress  Appearance: He is well-developed  He is not ill-appearing, toxic-appearing or diaphoretic  Comments: He appears comfortable lying in bed, his stated age, and frail  Cardiovascular:      Rate and Rhythm: Normal rate and regular rhythm        Heart "sounds: Normal heart sounds  No murmur heard  No friction rub  No gallop  Comments: His legs are wrapped and appear grossly without edema  Pulmonary:      Effort: Pulmonary effort is normal  No respiratory distress  Breath sounds: Normal breath sounds  No stridor  No wheezing, rhonchi or rales  Neurological:      Mental Status: He is alert  Psychiatric:         Mood and Affect: Mood normal          Behavior: Behavior normal  Behavior is cooperative  - His order summary was reviewed and signed  Portions of the record may have been created with voice recognition software  Occasional wrong word or \"sound a like\" substitutions may have occurred due to the inherent limitations of voice recognition software  Read the chart carefully and recognize, using context, where substitutions have occurred      ALMA Garcia   4/30/2023 4:16 PM        "

## 2023-04-30 NOTE — ASSESSMENT & PLAN NOTE
· He reports having difficulty with his CPAP mask fitting  · He is working with the facility respiratory therapist in this regard  · We will continue with monitoring for change in his condition

## 2023-04-30 NOTE — ASSESSMENT & PLAN NOTE
· He is doing well with furosemide and daily wrapping  · We will continue with this care plan  · We will continue with monitoring for change in his condition

## 2023-04-30 NOTE — ASSESSMENT & PLAN NOTE
· Secondary to his chronic medical conditions  · He continues to require 24/7 care/support of his ADLs  · I recommend continued care/support of his ADLs as a long-term resident at the nursing facility  · We will continue to monitor for change in his condition

## 2023-04-30 NOTE — ASSESSMENT & PLAN NOTE
· Left-sided: Leg greater than arm  · He reports that his pain is under good control with his current pregabalin  · In shared decision-making, will trial without routine oxycodone  · We will continue with monitoring for change in his condition

## 2023-06-20 ENCOUNTER — NURSING HOME VISIT (OUTPATIENT)
Dept: GERIATRICS | Facility: OTHER | Age: 73
End: 2023-06-20
Payer: COMMERCIAL

## 2023-06-20 DIAGNOSIS — I10 HYPERTENSION, UNSPECIFIED TYPE: Chronic | ICD-10-CM

## 2023-06-20 DIAGNOSIS — R52 PAIN AFTER CEREBROVASCULAR ACCIDENT (CVA): ICD-10-CM

## 2023-06-20 DIAGNOSIS — R60.0 LOWER EXTREMITY EDEMA: Primary | Chronic | ICD-10-CM

## 2023-06-20 DIAGNOSIS — I69.398 PAIN AFTER CEREBROVASCULAR ACCIDENT (CVA): ICD-10-CM

## 2023-06-20 DIAGNOSIS — R54 FRAILTY SYNDROME IN GERIATRIC PATIENT: Chronic | ICD-10-CM

## 2023-06-20 PROCEDURE — 99309 SBSQ NF CARE MODERATE MDM 30: CPT | Performed by: NURSE PRACTITIONER
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
## 2023-06-20 NOTE — ASSESSMENT & PLAN NOTE
· Continue Lyrica, as patient reports this has been effective  · Continue to encourage changes in conditoin  · Continue to consult PT/OT as appropriate  · Encourage exercise   · Continue to monitor for acute changes in condition

## 2023-06-20 NOTE — ASSESSMENT & PLAN NOTE
· Patient continues to have plus 2 ble edema  · Continue daily wraps AM and off PM  · Encourage elevation while sitting and laying in bed  · Continue NUVIA diet  · Continue Lasix daily

## 2023-06-20 NOTE — ASSESSMENT & PLAN NOTE
· BP today 135/71  · Continue Amlodipine, Lisinopril, Labatelol, and Hydralazine  · Avoid hypotension  · Will continue to monitor BP

## 2023-06-20 NOTE — ASSESSMENT & PLAN NOTE
• Patient with multiple Comorbidities  • Patient continues to require 24/7 long-term care in a facility in which supportive services and ADLs can be provided  • Maintain fall and safety precautions  • Continue to monitor patient for acute changes in condition/decline

## 2023-08-17 ENCOUNTER — NURSING HOME VISIT (OUTPATIENT)
Dept: GERIATRICS | Facility: OTHER | Age: 73
End: 2023-08-17
Payer: COMMERCIAL

## 2023-08-17 DIAGNOSIS — I69.30 HISTORY OF HEMORRHAGIC CEREBROVASCULAR ACCIDENT (CVA) WITH RESIDUAL DEFICIT: Chronic | ICD-10-CM

## 2023-08-17 DIAGNOSIS — R60.0 LOWER EXTREMITY EDEMA: Chronic | ICD-10-CM

## 2023-08-17 DIAGNOSIS — E11.59 HYPERTENSION ASSOCIATED WITH DIABETES (HCC): ICD-10-CM

## 2023-08-17 DIAGNOSIS — R54 FRAILTY SYNDROME IN GERIATRIC PATIENT: Primary | Chronic | ICD-10-CM

## 2023-08-17 DIAGNOSIS — E87.6 DRUG-INDUCED HYPOKALEMIA: Chronic | ICD-10-CM

## 2023-08-17 DIAGNOSIS — I69.354 HEMIPARESIS AFFECTING LEFT SIDE AS LATE EFFECT OF CEREBROVASCULAR ACCIDENT (HCC): Chronic | ICD-10-CM

## 2023-08-17 DIAGNOSIS — T50.905A DRUG-INDUCED HYPOKALEMIA: Chronic | ICD-10-CM

## 2023-08-17 DIAGNOSIS — Z78.9 FULL CODE STATUS: ICD-10-CM

## 2023-08-17 DIAGNOSIS — I15.2 HYPERTENSION ASSOCIATED WITH DIABETES (HCC): ICD-10-CM

## 2023-08-17 DIAGNOSIS — E11.9 TYPE 2 DIABETES MELLITUS WITHOUT COMPLICATION, WITHOUT LONG-TERM CURRENT USE OF INSULIN (HCC): Chronic | ICD-10-CM

## 2023-08-17 DIAGNOSIS — G25.2 RESTING TREMOR: ICD-10-CM

## 2023-08-17 DIAGNOSIS — G47.33 OBSTRUCTIVE SLEEP APNEA: ICD-10-CM

## 2023-08-17 PROCEDURE — 99309 SBSQ NF CARE MODERATE MDM 30: CPT | Performed by: INTERNAL MEDICINE

## 2023-08-21 PROBLEM — R53.81 DEBILITY: Status: RESOLVED | Noted: 2019-06-18 | Resolved: 2023-08-21

## 2023-08-21 PROBLEM — G25.2 RESTING TREMOR: Status: ACTIVE | Noted: 2023-01-22

## 2023-08-21 PROBLEM — E11.59 HYPERTENSION ASSOCIATED WITH DIABETES (HCC): Status: ACTIVE | Noted: 2019-06-18

## 2023-08-21 PROBLEM — I15.2 HYPERTENSION ASSOCIATED WITH DIABETES (HCC): Chronic | Status: ACTIVE | Noted: 2019-06-18

## 2023-08-21 PROBLEM — E11.59 HYPERTENSION ASSOCIATED WITH DIABETES (HCC): Chronic | Status: ACTIVE | Noted: 2019-06-18

## 2023-08-21 PROBLEM — I15.2 HYPERTENSION ASSOCIATED WITH DIABETES (HCC): Status: ACTIVE | Noted: 2019-06-18

## 2023-08-21 NOTE — ASSESSMENT & PLAN NOTE
· Observed in his hand during the visit  · Increased tone noted on exam in his right arm  · We will have nursing staff schedule a follow-up visit with his neurologist for further evaluation and treatment  · We will continue with monitoring for change in his condition

## 2023-08-21 NOTE — PROGRESS NOTES
[address]  Facility: [address]  32  Follow-up visit    NAME: Raysa Dyer  AGE: 67 y.o. SEX: male    DATE OF ENCOUNTER: 8/17/2023    Code status:  CPR    Assessment and Plan     1. Frailty syndrome in geriatric patient  Assessment & Plan:  · Secondary to his chronic medical conditions  · He continues to require 24/7 care/support of his ADLs  · I recommend continued care/support of his ADLs as a long-term resident at the nursing facility  · We will continue to monitor for change in his condition      2. Hemiparesis affecting left side as late effect of cerebrovascular accident [facility])  Assessment & Plan:  · We will continue with secondary prevention with treatment of his hypertension, hyperlipidemia, and diabetes  · We will continue with 24/7 care/support of his ADLs  · We will continue his care in collaboration with his neurology service  · We will continue with monitoring for change in his condition      3. Hypertension associated with diabetes ([address])  Assessment & Plan:  · Review of his BP log shows that he is doing well with labetalol, lisinopril, amlodipine, and hydralazine  · We will continue with this medication regimen  · We will continue with clinical and periodic laboratory monitoring for change in his condition      4. Type 2 diabetes mellitus without complication, without long-term current use of insulin [facility])  Assessment & Plan:  · April 21, 2022: Hemoglobin A1c:  6%,   · November 10, 2022: Hemoglobin A1c: 5.7%,   · Review of his fingerstick blood sugar log looks well without medication  · We will target a hemoglobin A1c between 7.5% and 8% given his frailty and comorbidities  · We will continue with clinical and periodic laboratory monitoring for change in his condition      5.  Resting tremor  Assessment & Plan:  · Observed in his hand during the visit  · Increased tone noted on exam in his right arm  · We will have nursing staff schedule a follow-up visit with his neurologist for further evaluation and treatment  · We will continue with monitoring for change in his condition      6. Lower extremity edema  Assessment & Plan:  · He continues to do well with furosemide and lower extremity wrapping  · We will continue with clinical and periodic laboratory monitoring for change in his condition      7. Drug-induced hypokalemia  Assessment & Plan:  · Secondary to chronic diuretic use for lower extremity edema  · His recent potassium level looks well  · We will continue with oral potassium replacement therapy and periodic laboratory monitoring for change in his condition      8. Obstructive sleep apnea  Assessment & Plan:  · He reports difficulty with his CPAP mask with leakage  · I discussed this with nursing and the facility respiratory service staff  · We will continue with monitoring for change in his condition      9. History of hemorrhagic cerebrovascular accident (CVA) with residual deficit  Assessment & Plan:  · We will continue with treatment of his hypertension  · We will continue with monitoring for change in his condition      10. Full code status    See my note of April 27, 2023 for further information. All medications and routine orders were reviewed and updated as needed. Plan discussed with: Patient, facility respiratory staff, and nursing staff. Chief Complaint     He is seen for a follow-up visit to update the care and treatment of his chronic medical conditions. History of Present Illness     He is a pleasant 59-year-old gentleman who is seen with nursing staff for a follow-up visit to update the care and treatment of his chronic medical conditions, including frailty secondary to his chronic medical conditions, left-sided hemiparesis secondary to prior CVA, hypertension, and type 2 diabetes mellitus.     He and nursing note that his leg edema has been doing well with continued wrapping. He notes trouble sleeping secondary to his CPAP mask leaking. The following portions of the patient's history were reviewed and updated as appropriate: current medications, past family history, past medical history, past social history, past surgical history and problem list.    Allergies:  No Known Allergies    Review of Systems     Review of Systems   Cardiovascular: Negative for leg swelling. Psychiatric/Behavioral: Positive for sleep disturbance. See HPI       Medications and orders     All medications reviewed and updated in correction EMR. Objective     Vitals: Monthly vitals: Weight 298.2 pounds, pulse 58, blood pressure 125/64, fasting fingerstick blood sugar 116. Physical Exam  Vitals reviewed. Exam conducted with a chaperone present. Constitutional:       General: He is awake. He is not in acute distress. Appearance: He is not toxic-appearing or diaphoretic. Comments: He appears comfortable lying in bed, younger than his stated age, and frail. Cardiovascular:      Comments: His lower legs are wrapped and do not appear to have any edema. Neurological:      Mental Status: He is alert. Comments: Resting tremors noted in hands and increased tone in his right upper extremity   Psychiatric:         Mood and Affect: Mood normal.         Behavior: Behavior normal. Behavior is cooperative. Pertinent Laboratory/Diagnostic Studies: The following labs were reviewed please see chart or hospital paperwork for details. July 14, 2023:    BMP: Sodium 144, potassium 3.8, BUN 17, creatinine 1.06, fasting blood sugar 90, EGFR 74.    - His order summary was reviewed and signed. Portions of the record may have been created with voice recognition software. Occasional wrong word or "sound a like" substitutions may have occurred due to the inherent limitations of voice recognition software.   Read the chart carefully and recognize, using context, where substitutions have occurred.     Luigi Wiley M.D.  8/21/2023 8:06 AM

## 2023-08-21 NOTE — ASSESSMENT & PLAN NOTE
· Review of his BP log shows that he is doing well with labetalol, lisinopril, amlodipine, and hydralazine  · We will continue with this medication regimen  · We will continue with clinical and periodic laboratory monitoring for change in his condition

## 2023-08-21 NOTE — ASSESSMENT & PLAN NOTE
· He reports difficulty with his CPAP mask with leakage  · I discussed this with nursing and the facility respiratory service staff  · We will continue with monitoring for change in his condition

## 2023-08-21 NOTE — ASSESSMENT & PLAN NOTE
· April 21, 2022: Hemoglobin A1c:  6%,   · November 10, 2022: Hemoglobin A1c: 5.7%,   · Review of his fingerstick blood sugar log looks well without medication  · We will target a hemoglobin A1c between 7.5% and 8% given his frailty and comorbidities  · We will continue with clinical and periodic laboratory monitoring for change in his condition

## 2023-08-21 NOTE — ASSESSMENT & PLAN NOTE
· He continues to do well with furosemide and lower extremity wrapping  · We will continue with clinical and periodic laboratory monitoring for change in his condition

## 2023-08-21 NOTE — ASSESSMENT & PLAN NOTE
· We will continue with secondary prevention with treatment of his hypertension, hyperlipidemia, and diabetes  · We will continue with 24/7 care/support of his ADLs  · We will continue his care in collaboration with his neurology service  · We will continue with monitoring for change in his condition

## 2023-08-21 NOTE — ASSESSMENT & PLAN NOTE
· Secondary to chronic diuretic use for lower extremity edema  · His recent potassium level looks well  · We will continue with oral potassium replacement therapy and periodic laboratory monitoring for change in his condition

## 2023-08-21 NOTE — ASSESSMENT & PLAN NOTE
· We will continue with treatment of his hypertension  · We will continue with monitoring for change in his condition

## 2023-10-13 ENCOUNTER — NURSING HOME VISIT (OUTPATIENT)
Dept: GERIATRICS | Facility: OTHER | Age: 73
End: 2023-10-13
Payer: COMMERCIAL

## 2023-10-13 DIAGNOSIS — R52 PAIN AFTER CEREBROVASCULAR ACCIDENT (CVA): ICD-10-CM

## 2023-10-13 DIAGNOSIS — R54 FRAILTY SYNDROME IN GERIATRIC PATIENT: Chronic | ICD-10-CM

## 2023-10-13 DIAGNOSIS — I69.398 PAIN AFTER CEREBROVASCULAR ACCIDENT (CVA): ICD-10-CM

## 2023-10-13 DIAGNOSIS — I15.2 HYPERTENSION ASSOCIATED WITH DIABETES: Primary | Chronic | ICD-10-CM

## 2023-10-13 DIAGNOSIS — F33.0 MAJOR DEPRESSIVE DISORDER, RECURRENT, MILD (HCC): ICD-10-CM

## 2023-10-13 DIAGNOSIS — E11.59 HYPERTENSION ASSOCIATED WITH DIABETES: Primary | Chronic | ICD-10-CM

## 2023-10-13 PROCEDURE — 99309 SBSQ NF CARE MODERATE MDM 30: CPT | Performed by: NURSE PRACTITIONER
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
## 2023-10-15 PROBLEM — F33.0 MAJOR DEPRESSIVE DISORDER, RECURRENT, MILD (HCC): Status: ACTIVE | Noted: 2023-09-28

## 2023-10-16 NOTE — ASSESSMENT & PLAN NOTE
Continue supportive measures  Continue Prozac  Encourage activity and engagement  We will continue care in collaboration with psychiatry services

## 2023-10-16 NOTE — ASSESSMENT & PLAN NOTE
BP today 133/61  Continue Amlodipine, Lisinopril, Labatelol, and Hydralazine  Avoid hypotension  Will continue to monitor BP

## 2023-10-16 NOTE — ASSESSMENT & PLAN NOTE
Patient with multiple comorbidities  Patient continues to require 24/7 long-term care in a facility in which supportive services and ADLs can be provided  Maintain fall and safety precautions  Continue to monitor patient for acute changes in condition/decline

## 2023-10-16 NOTE — ASSESSMENT & PLAN NOTE
Continue Lyrica, as patient reports this has been effective  Continue to encourage changes in conditoin  Continue to consult PT/OT as appropriate  Encourage exercise   Continue to monitor for acute changes in condition

## 2023-11-20 DIAGNOSIS — R52 PAIN AFTER CEREBROVASCULAR ACCIDENT (CVA): ICD-10-CM

## 2023-11-20 DIAGNOSIS — I69.398 PAIN AFTER CEREBROVASCULAR ACCIDENT (CVA): ICD-10-CM

## 2023-11-20 RX ORDER — PREGABALIN 75 MG/1
75 CAPSULE ORAL
Qty: 30 CAPSULE | Refills: 3 | Status: SHIPPED | OUTPATIENT
Start: 2023-11-20

## 2023-11-20 RX ORDER — PREGABALIN 50 MG/1
50 CAPSULE ORAL DAILY
Qty: 30 CAPSULE | Refills: 3 | Status: SHIPPED | OUTPATIENT
Start: 2023-11-20

## 2023-12-22 ENCOUNTER — NURSING HOME VISIT (OUTPATIENT)
Dept: GERIATRICS | Facility: OTHER | Age: 73
End: 2023-12-22
Payer: COMMERCIAL

## 2023-12-22 DIAGNOSIS — I15.2 HYPERTENSION ASSOCIATED WITH DIABETES: Chronic | ICD-10-CM

## 2023-12-22 DIAGNOSIS — Z78.9 FULL CODE STATUS: ICD-10-CM

## 2023-12-22 DIAGNOSIS — E87.6 DRUG-INDUCED HYPOKALEMIA: Chronic | ICD-10-CM

## 2023-12-22 DIAGNOSIS — R52 PAIN AFTER CEREBROVASCULAR ACCIDENT (CVA): ICD-10-CM

## 2023-12-22 DIAGNOSIS — E11.9 TYPE 2 DIABETES MELLITUS WITHOUT COMPLICATION, WITHOUT LONG-TERM CURRENT USE OF INSULIN (HCC): Primary | Chronic | ICD-10-CM

## 2023-12-22 DIAGNOSIS — T50.905A DRUG-INDUCED HYPOKALEMIA: Chronic | ICD-10-CM

## 2023-12-22 DIAGNOSIS — I69.354 HEMIPARESIS AFFECTING LEFT SIDE AS LATE EFFECT OF CEREBROVASCULAR ACCIDENT (HCC): Chronic | ICD-10-CM

## 2023-12-22 DIAGNOSIS — R60.0 LOWER EXTREMITY EDEMA: Chronic | ICD-10-CM

## 2023-12-22 DIAGNOSIS — I69.30 HISTORY OF HEMORRHAGIC CEREBROVASCULAR ACCIDENT (CVA) WITH RESIDUAL DEFICIT: Chronic | ICD-10-CM

## 2023-12-22 DIAGNOSIS — R54 FRAILTY SYNDROME IN GERIATRIC PATIENT: Chronic | ICD-10-CM

## 2023-12-22 DIAGNOSIS — E11.59 HYPERTENSION ASSOCIATED WITH DIABETES: Chronic | ICD-10-CM

## 2023-12-22 DIAGNOSIS — I69.398 PAIN AFTER CEREBROVASCULAR ACCIDENT (CVA): ICD-10-CM

## 2023-12-22 PROCEDURE — 99309 SBSQ NF CARE MODERATE MDM 30: CPT | Performed by: INTERNAL MEDICINE

## 2024-02-01 RX ORDER — TORSEMIDE 20 MG/1
20 TABLET ORAL DAILY
COMMUNITY
Start: 2023-12-07

## 2024-02-01 NOTE — ASSESSMENT & PLAN NOTE
He reports that his pain is under control with his current dosage of pregabalin  Will continue with this care plan  Will continue with monitoring for change in his condition

## 2024-02-01 NOTE — ASSESSMENT & PLAN NOTE
Will continue his care in collaboration with the vascular surgery service who recommended compression therapy  Will continue with current torsemide and wrapping  Will continue with monitoring for change in his condition

## 2024-02-01 NOTE — ASSESSMENT & PLAN NOTE
He is doing well with his current medication regimen of labetalol, lisinopril, amlodipine, and hydralazine  Will continue with this care plan  Will continue with clinical and periodic laboratory monitoring for change in his condition

## 2024-02-01 NOTE — ASSESSMENT & PLAN NOTE
Secondary to chronic diuretic use for his lower extremity edema  He is doing well with oral potassium replacement therapy  Will continue with this care plan  Will continue with periodic laboratory monitoring for change in his condition

## 2024-02-01 NOTE — ASSESSMENT & PLAN NOTE
Secondary to his chronic medical conditions  He continues to require 24/7 care/support of his ADLs  I recommend continued care/support of his ADLs as a long-term resident at the nursing facility  Will continue to monitor for change in his condition

## 2024-02-01 NOTE — ASSESSMENT & PLAN NOTE
He is doing well with management of his hypertension  Will continue with 24/7 care/support of his ADLs at the nursing facility  Will continue to monitor for change in his condition

## 2024-02-01 NOTE — PROGRESS NOTES
[facility]  [address]  [place] [address]  Facility: [facility]  [facility]  32  Follow-up visit    NAME: Stepan Mulligan  AGE: 73 y.o. SEX: male    DATE OF ENCOUNTER: December 22, 2023.    Code status:  CPR    Assessment and Plan     1. Type 2 diabetes mellitus without complication, without long-term current use of insulin (HCC)  Assessment & Plan:  Review of his fingerstick blood sugar log looks well without medication  We will target a hemoglobin A1c less than 8% given frailty and long-term care residence  Will continue with clinical and periodic laboratory monitoring for change in his condition      2. Lower extremity edema  Assessment & Plan:  Will continue his care in collaboration with the vascular surgery service who recommended compression therapy  Will continue with current torsemide and wrapping  Will continue with monitoring for change in his condition      3. Drug-induced hypokalemia  Assessment & Plan:  Secondary to chronic diuretic use for his lower extremity edema  He is doing well with oral potassium replacement therapy  Will continue with this care plan  Will continue with periodic laboratory monitoring for change in his condition      4. Frailty syndrome in geriatric patient  Assessment & Plan:  Secondary to his chronic medical conditions  He continues to require 24/7 care/support of his ADLs  I recommend continued care/support of his ADLs as a long-term resident at the nursing facility  Will continue to monitor for change in his condition      5. 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)  Assessment & Plan:  He reports that his pain is under control with his current dosage of pregabalin  Will continue with this care plan  Will continue with monitoring for change in his condition      6. Hemiparesis affecting left side as late effect of cerebrovascular accident (HCC)  Assessment & Plan:  He is doing well with secondary  prevention of atorvastatin and management of his hypertension  Will continue with 24/7 care/support of his ADLs at the nursing facility  Will continue with monitoring for change in his condition      7. History of hemorrhagic cerebrovascular accident (CVA) with residual deficit  Assessment & Plan:  He is doing well with management of his hypertension  Will continue with 24/7 care/support of his ADLs at the nursing facility  Will continue to monitor for change in his condition      8. Hypertension associated with diabetes   Assessment & Plan:  He is doing well with his current medication regimen of labetalol, lisinopril, amlodipine, and hydralazine  Will continue with this care plan  Will continue with clinical and periodic laboratory monitoring for change in his condition      9. Full code status      See my note of August 17, 2023 for further information.    All medications and routine orders were reviewed and updated as needed.    Plan discussed with: Patient and nursing staff.    Chief Complaint     He is seen for a follow-up visit to update the care and treatment of his chronic medical conditions.    History of Present Illness     He is a 73-year-old gentleman who is seen for a follow-up visit to update the care and treatment of his chronic medical conditions, including type 2 diabetes mellitus, lower extremity edema, left-sided hemiparesis as late effect of hemorrhagic CVA, and frailty secondary to his chronic medical conditions.    He denies chest pain and shortness of breath.  He reports that the pain in his legs is controlled.    The following portions of the patient's history were reviewed and updated as appropriate: current medications, past family history, past medical history, past social history, past surgical history and problem list.    Allergies:  No Known Allergies    Review of Systems     Review of Systems   Respiratory:  Negative for shortness of breath.    Cardiovascular:  Negative for chest  "pain.   Neurological:         See HPI       Medications and orders     All medications reviewed and updated in [facility] EMR.      Objective     Vitals: Recent vitals: Weight 293 pounds, pulse 49, blood pressure 123/67, fasting fingerstick blood sugar 109.    Physical Exam  Vitals reviewed.   Constitutional:       General: He is awake. He is not in acute distress.     Appearance: He is well-developed. He is not toxic-appearing or diaphoretic.   Cardiovascular:      Rate and Rhythm: Normal rate and regular rhythm.      Heart sounds: Normal heart sounds. No murmur heard.     No friction rub. No gallop.      Comments: 4+ bilateral lower extremity edema  Neurological:      Mental Status: He is alert.   Psychiatric:         Mood and Affect: Mood normal.         Behavior: Behavior normal. Behavior is cooperative.       Pertinent Laboratory/Diagnostic Studies:     The following labs were reviewed please see chart or hospital paperwork for details.    December 14, 2023:    BMP: Sodium 148, potassium 3.8, BUN 20, creatinine 1.18, fasting blood sugar 104, EGFR 61.    - His order summary was reviewed and signed.      Portions of the record may have been created with voice recognition software.  Occasional wrong word or \"sound a like\" substitutions may have occurred due to the inherent limitations of voice recognition software.  Read the chart carefully and recognize, using context, where substitutions have occurred.    Mao Cross M.D.  2/1/2024 7:40 AM      "

## 2024-02-01 NOTE — ASSESSMENT & PLAN NOTE
Review of his fingerstick blood sugar log looks well without medication  We will target a hemoglobin A1c less than 8% given frailty and long-term care residence  Will continue with clinical and periodic laboratory monitoring for change in his condition

## 2024-02-01 NOTE — ASSESSMENT & PLAN NOTE
He is doing well with secondary prevention of atorvastatin and management of his hypertension  Will continue with 24/7 care/support of his ADLs at the nursing facility  Will continue with monitoring for change in his condition

## 2024-02-23 ENCOUNTER — NURSING HOME VISIT (OUTPATIENT)
Dept: GERIATRICS | Facility: OTHER | Age: 74
End: 2024-02-23
Payer: COMMERCIAL

## 2024-02-23 DIAGNOSIS — Z78.9 FULL CODE STATUS: ICD-10-CM

## 2024-02-23 DIAGNOSIS — E11.59 HYPERTENSION ASSOCIATED WITH DIABETES: Primary | Chronic | ICD-10-CM

## 2024-02-23 DIAGNOSIS — G47.33 OBSTRUCTIVE SLEEP APNEA: ICD-10-CM

## 2024-02-23 DIAGNOSIS — I69.30 HISTORY OF HEMORRHAGIC CEREBROVASCULAR ACCIDENT (CVA) WITH RESIDUAL DEFICIT: Chronic | ICD-10-CM

## 2024-02-23 DIAGNOSIS — I15.2 HYPERTENSION ASSOCIATED WITH DIABETES: Primary | Chronic | ICD-10-CM

## 2024-02-23 DIAGNOSIS — R60.0 LOWER EXTREMITY EDEMA: Chronic | ICD-10-CM

## 2024-02-23 PROCEDURE — 99309 SBSQ NF CARE MODERATE MDM 30: CPT | Performed by: PHYSICIAN ASSISTANT

## 2024-02-29 VITALS
RESPIRATION RATE: 17 BRPM | SYSTOLIC BLOOD PRESSURE: 128 MMHG | DIASTOLIC BLOOD PRESSURE: 78 MMHG | WEIGHT: 295.3 LBS | BODY MASS INDEX: 47.66 KG/M2 | TEMPERATURE: 98 F | HEART RATE: 64 BPM

## 2024-02-29 NOTE — ASSESSMENT & PLAN NOTE
Lab Results   Component Value Date    HGBA1C 5.7 (H) 11/10/2022     BPs have been running on low side with SBP at times <100. Also has issues with LE edema. Will discontinue amlodipine. Continue to monitor  Continue current medication regimen including hydralazine, labetalol, lisinopril, torsemide

## 2024-02-29 NOTE — PROGRESS NOTES
[facility]  [address] [place] [address]  [phone]  [facility]  Code 32      NAME: Stepan Mulligan  AGE: 73 y.o. SEX: male [phone]    DATE OF ENCOUNTER: 2/23/24    CODE STATUS: CPR    Assessment and Plan     Problem List Items Addressed This Visit          Endocrine    Hypertension associated with diabetes  - Primary (Chronic)       Lab Results   Component Value Date    HGBA1C 5.7 (H) 11/10/2022     BPs have been running on low side with SBP at times <100. Also has issues with LE edema. Will discontinue amlodipine. Continue to monitor  Continue current medication regimen including hydralazine, labetalol, lisinopril, torsemide            Respiratory    Obstructive sleep apnea     Continue CPAP            Other    History of hemorrhagic cerebrovascular accident (CVA) with residual deficit (Chronic)     Continue statin  Continue close monitoring of BP         Lower extremity edema (Chronic)     Previously seen by vascular surgery  Continue current dose torsemide         Full code status       No orders of the defined types were placed in this encounter.          Chief Complaint     Chief Complaint   Patient presents with    Geriatric Evaluation     Follow up       History of Present Illness   73 year old male resident of [facility] being seen today in collaboration with nursing for follow up for chronic conditions. Patient feels well. Per nursing he does not like getting OOB often. Did encourage resident to get OOB to participate in activities.          The following portions of the patient's history were reviewed and updated as appropriate: allergies, current medications, past family history, past medical history, past social history, past surgical history and problem list.    Review of Systems   Review of Systems   Constitutional: Negative.    HENT: Negative.     Eyes: Negative.    Respiratory: Negative.     Cardiovascular:  Positive for  leg swelling.   Gastrointestinal: Negative.    Endocrine: Negative.    Genitourinary: Negative.    Musculoskeletal:  Positive for arthralgias and gait problem.   Hematological: Negative.    Psychiatric/Behavioral: Negative.            Active Problem List     Patient Active Problem List   Diagnosis    Hypertension associated with diabetes     History of hemorrhagic cerebrovascular accident (CVA) with residual deficit    Lower extremity edema    Hemiparesis affecting left side as late effect of cerebrovascular accident (HCC)    Type 2 diabetes mellitus, without long-term current use of insulin ([facility])    Anemia of chronic disease    Obstructive sleep apnea    Hyperlipidemia    Drug-induced constipation    History of gastroesophageal reflux (GERD)    Neuropathy    Obesity    POLST (Physician Orders for Life-Sustaining Treatment)    Goals of care, counseling/discussion    Medication management    Full code status    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)    Frailty syndrome in geriatric patient    Drug-induced hypokalemia    Body mass index (BMI) of 40.0 to 44.9 in adult ([facility])    Resting tremor    Major depressive disorder, recurrent, mild ([facility])         Objective     /78   Pulse 64   Temp 98 °F (36.7 °C)   Resp 17   Wt 134 kg (295 lb 4.8 oz)   BMI 47.66 kg/m²     Physical Exam  Vitals reviewed.   Constitutional:       General: He is not in acute distress.     Appearance: He is obese. He is not diaphoretic.   HENT:      Head: Normocephalic and atraumatic.      Nose: Nose normal.      Mouth/Throat:      Pharynx: Oropharynx is clear.   Cardiovascular:      Rate and Rhythm: Normal rate.   Pulmonary:      Effort: Pulmonary effort is normal. No respiratory distress.      Breath sounds: Normal breath sounds.   Abdominal:      General: Bowel sounds are normal. There is no distension.      Tenderness: There is no abdominal tenderness.   Musculoskeletal:         General: No deformity or signs of injury.      Right  lower leg: Edema present.      Left lower leg: Edema present.   Skin:     General: Skin is warm and dry.      Findings: No bruising or erythema.   Neurological:      Mental Status: He is alert. Mental status is at baseline.         Pertinent Laboratory/Diagnostic Studies:    2/1/24  Creat 1.24    K 3.7    Current Medications   Medications reviewed and updated in facility chart.

## 2024-04-18 ENCOUNTER — NURSING HOME VISIT (OUTPATIENT)
Dept: GERIATRICS | Facility: OTHER | Age: 74
End: 2024-04-18
Payer: COMMERCIAL

## 2024-04-18 DIAGNOSIS — I89.0 LYMPHEDEMA ASSOCIATED WITH OBESITY: Primary | ICD-10-CM

## 2024-04-18 DIAGNOSIS — E66.9 LYMPHEDEMA ASSOCIATED WITH OBESITY: Primary | ICD-10-CM

## 2024-04-18 PROCEDURE — 99308 SBSQ NF CARE LOW MDM 20: CPT | Performed by: INTERNAL MEDICINE

## 2024-04-25 ENCOUNTER — NURSING HOME VISIT (OUTPATIENT)
Dept: GERIATRICS | Facility: OTHER | Age: 74
End: 2024-04-25
Payer: COMMERCIAL

## 2024-04-25 DIAGNOSIS — E11.9 TYPE 2 DIABETES MELLITUS WITHOUT COMPLICATION, WITHOUT LONG-TERM CURRENT USE OF INSULIN (HCC): Chronic | ICD-10-CM

## 2024-04-25 DIAGNOSIS — I89.0 LYMPHEDEMA ASSOCIATED WITH OBESITY: ICD-10-CM

## 2024-04-25 DIAGNOSIS — I15.2 HYPERTENSION ASSOCIATED WITH DIABETES  (HCC): Chronic | ICD-10-CM

## 2024-04-25 DIAGNOSIS — I69.30 HISTORY OF HEMORRHAGIC CEREBROVASCULAR ACCIDENT (CVA) WITH RESIDUAL DEFICIT: Chronic | ICD-10-CM

## 2024-04-25 DIAGNOSIS — I69.354 HEMIPARESIS AFFECTING LEFT SIDE AS LATE EFFECT OF CEREBROVASCULAR ACCIDENT (HCC): Primary | Chronic | ICD-10-CM

## 2024-04-25 DIAGNOSIS — G47.33 OBSTRUCTIVE SLEEP APNEA: ICD-10-CM

## 2024-04-25 DIAGNOSIS — E11.59 HYPERTENSION ASSOCIATED WITH DIABETES  (HCC): Chronic | ICD-10-CM

## 2024-04-25 DIAGNOSIS — Z78.9 FULL CODE STATUS: ICD-10-CM

## 2024-04-25 DIAGNOSIS — R54 FRAILTY SYNDROME IN GERIATRIC PATIENT: Chronic | ICD-10-CM

## 2024-04-25 DIAGNOSIS — E78.5 HYPERLIPIDEMIA, UNSPECIFIED HYPERLIPIDEMIA TYPE: ICD-10-CM

## 2024-04-25 DIAGNOSIS — E66.9 LYMPHEDEMA ASSOCIATED WITH OBESITY: ICD-10-CM

## 2024-04-25 PROCEDURE — 99309 SBSQ NF CARE MODERATE MDM 30: CPT | Performed by: INTERNAL MEDICINE

## 2024-05-27 PROBLEM — E66.9 LYMPHEDEMA ASSOCIATED WITH OBESITY: Status: ACTIVE | Noted: 2019-06-18

## 2024-05-27 PROBLEM — I89.0 LYMPHEDEMA ASSOCIATED WITH OBESITY: Status: ACTIVE | Noted: 2019-06-18

## 2024-05-27 NOTE — ASSESSMENT & PLAN NOTE
He reports being asymptomatic and that his legs are doing well with wrapping  Will not make any changes in his medications  Will change his target weight to be between 290 pounds and 300 pounds  Will continue with monitoring for change in his condition

## 2024-05-27 NOTE — PROGRESS NOTES
[facility]  [address]  [place] [address]  Facility: [facility]  [facility]  32  Follow-up visit    NAME: Stepan Mulligan  AGE: 73 y.o. SEX: male    DATE OF ENCOUNTER: April 18, 2024.    Code status:  CPR    Assessment and Plan     1. Lymphedema associated with obesity  Assessment & Plan:  He reports being asymptomatic and that his legs are doing well with wrapping  Will not make any changes in his medications  Will change his target weight to be between 290 pounds and 300 pounds  Will continue with monitoring for change in his condition      All medications and routine orders were reviewed and updated as needed.    Plan discussed with: Patient and nursing staff.    I have spent a total time of 25 minutes on April 18, 2024 in caring for this patient including Risks and benefits of tx options, Instructions for management, Impressions, Counseling / Coordination of care, Documenting in the medical record, Obtaining or reviewing history  , and Communicating with other healthcare professionals .      Chief Complaint     Notified by nursing that his weight is out of range.    History of Present Illness     He is a pleasant 73-year-old gentleman who is seen for a follow-up visit regarding his leg edema and his weight being out of range.  Recent weight is 294 pounds which is below his goal range of 295 pounds to 305 pounds.    He denies shortness of breath.  He reports that his leg swelling is doing well with wrapping.    The following portions of the patient's history were reviewed and updated as appropriate: current medications, past family history, past medical history, past social history, past surgical history and problem list.    Allergies:  No Known Allergies    Review of Systems     Review of Systems   Respiratory:  Negative for shortness of breath.    Cardiovascular:         See HPI       Medications and orders     All medications reviewed  "and updated in Nursing Home EMR.      Objective     Vitals: Weight 294 pounds    Physical Exam  Musculoskeletal:      Comments: There is brawny edema of posterior thigh       - New orders written and reviewed with nursing staff.      Portions of the record may have been created with voice recognition software.  Occasional wrong word or \"sound a like\" substitutions may have occurred due to the inherent limitations of voice recognition software.  Read the chart carefully and recognize, using context, where substitutions have occurred.    Mao Cross M.D.  5/27/2024 7:48 PM      "

## 2024-05-28 NOTE — ASSESSMENT & PLAN NOTE
He endorses using his CPAP nightly  Will continue his care in collaboration with the facility respiratory care service  Will continue with monitoring for change in his condition

## 2024-05-28 NOTE — ASSESSMENT & PLAN NOTE
He is doing well with atorvastatin  Will continue with periodic laboratory monitoring for change in his condition

## 2024-05-28 NOTE — ASSESSMENT & PLAN NOTE
Review of his fingerstick blood sugar log looks well without medication  Will continue to target a hemoglobin A1c less than 8% given frailty and long-term care residence  Will continue with clinical and periodic laboratory monitoring for change in his condition

## 2024-05-28 NOTE — PROGRESS NOTES
[facility]  [address]  [place] [address]  Facility: [facility]  [facility]  32  Follow-up visit    NAME: Stepan Mulligan  AGE: 73 y.o. SEX: male    DATE OF ENCOUNTER: April 25, 2024.    Code status:  CPR    Assessment and Plan     1. Hemiparesis affecting left side as late effect of cerebrovascular accident (HCC)  Assessment & Plan:  Will continue with 24/7 care/support of his ADLs at the nursing facility  Will continue with secondary stroke prevention  Will continue with monitoring for change in his condition  2. Frailty syndrome in geriatric patient  Assessment & Plan:  Secondary to his chronic medical conditions  He continues to require 24/7 care/support of his ADLs  I recommend continued care/support of his ADLs as a long term resident at the nursing facility  Will continue to monitor for change in his condition  3. History of hemorrhagic cerebrovascular accident (CVA) with residual deficit  Assessment & Plan:  Will continue with treatment of his high blood pressure  Will continue with 24/7 care/support of his ADLs at the nursing facility  Will continue with secondary stroke prevention  Will continue with monitoring for change in his condition  4. Hypertension associated with diabetes  (HCC)  Assessment & Plan:  He is doing well with labetalol, hydralazine, and lisinopril  Will continue with this care plan  Will continue with clinical and periodic laboratory monitoring for change in his condition  5. Lymphedema associated with obesity  Assessment & Plan:  He is doing well with his current diuretic therapy and daily leg wrapping  Will continue with this care plan  Will continue with monitoring for change in his condition  6. Obstructive sleep apnea  Assessment & Plan:  He endorses using his CPAP nightly  Will continue his care in collaboration with the facility respiratory care service  Will continue with monitoring for change in his  condition  7. Type 2 diabetes mellitus without complication, without long-term current use of insulin (HCC)  Assessment & Plan:  Review of his fingerstick blood sugar log looks well without medication  Will continue to target a hemoglobin A1c less than 8% given frailty and long-term care residence  Will continue with clinical and periodic laboratory monitoring for change in his condition  8. Hyperlipidemia, unspecified hyperlipidemia type  Assessment & Plan:  He is doing well with atorvastatin  Will continue with periodic laboratory monitoring for change in his condition  9. Full code status    See my note of December 22, 2023 information.    All medications and routine orders were reviewed and updated as needed.    Plan discussed with: Patient and nursing staff.    Chief Complaint     He is seen for a follow-up visit to update the care and treatment of his chronic medical conditions.    History of Present Illness     He is a pleasant 73-year-old gentleman who is seen for a follow-up visit to update the care and treatment of his chronic medical conditions, including hypertension, frailty secondary to his chronic medical conditions, a history of hemorrhagic CVA with left-sided hemiparesis, and lymphedema.    He denies chest pain and shortness of breath.    The following portions of the patient's history were reviewed and updated as appropriate: current medications, past family history, past medical history, past social history, past surgical history and problem list.    Allergies:  No Known Allergies    Review of Systems     Review of Systems   Respiratory:  Negative for shortness of breath.    Cardiovascular:  Negative for chest pain.       Medications and orders     All medications reviewed and updated in [facility] EMR.      Objective     Vitals: Recent vitals: Weight 294 pounds, pulse 53, blood pressure 124/66, fasting fingerstick blood sugar 117    Physical Exam  Vitals reviewed.   Constitutional:        "General: He is awake. He is not in acute distress.     Appearance: He is well-developed. He is not toxic-appearing or diaphoretic.      Comments: Appears comfortable lying in bed, stated age, and frail   Cardiovascular:      Rate and Rhythm: Normal rate and regular rhythm.      Heart sounds: Normal heart sounds. No murmur heard.     No friction rub. No gallop.   Pulmonary:      Effort: No respiratory distress.      Breath sounds: Normal breath sounds. No stridor. No wheezing, rhonchi or rales.   Musculoskeletal:      Comments: Ace wraps in place on bilateral lower extremities   Neurological:      Mental Status: He is alert.   Psychiatric:         Behavior: Behavior is cooperative.       Pertinent Laboratory/Diagnostic Studies:     The following labs were reviewed please see chart or hospital paperwork for details.    April 22, 2024:    BMP: Sodium 149, potassium 4, BUN 16, creatinine 1.2, fasting blood sugar 113, EGFR 63    - His order summary was reviewed and signed.      Portions of the record may have been created with voice recognition software.  Occasional wrong word or \"sound a like\" substitutions may have occurred due to the inherent limitations of voice recognition software.  Read the chart carefully and recognize, using context, where substitutions have occurred.    Mao Cross M.D.  5/27/2024 8:47 PM      "

## 2024-05-28 NOTE — ASSESSMENT & PLAN NOTE
Will continue with treatment of his high blood pressure  Will continue with 24/7 care/support of his ADLs at the nursing facility  Will continue with secondary stroke prevention  Will continue with monitoring for change in his condition

## 2024-05-28 NOTE — ASSESSMENT & PLAN NOTE
He is doing well with his current diuretic therapy and daily leg wrapping  Will continue with this care plan  Will continue with monitoring for change in his condition

## 2024-05-28 NOTE — ASSESSMENT & PLAN NOTE
He is doing well with labetalol, hydralazine, and lisinopril  Will continue with this care plan  Will continue with clinical and periodic laboratory monitoring for change in his condition

## 2024-05-28 NOTE — ASSESSMENT & PLAN NOTE
Will continue with 24/7 care/support of his ADLs at the nursing facility  Will continue with secondary stroke prevention  Will continue with monitoring for change in his condition

## 2024-05-28 NOTE — ASSESSMENT & PLAN NOTE
Secondary to his chronic medical conditions  He continues to require 24/7 care/support of his ADLs  I recommend continued care/support of his ADLs as a long term resident at the nursing facility  Will continue to monitor for change in his condition

## 2024-06-03 NOTE — ASSESSMENT & PLAN NOTE
· He is doing well with atorvastatin  · We will target an LDL less than 70 (but greater than 40) given history of CVA and diabetes  · We will continue with periodic laboratory monitoring for change in his condition normal balance

## 2024-06-12 ENCOUNTER — NURSING HOME VISIT (OUTPATIENT)
Dept: GERIATRICS | Facility: OTHER | Age: 74
End: 2024-06-12
Payer: COMMERCIAL

## 2024-06-12 DIAGNOSIS — I89.0 LYMPHEDEMA ASSOCIATED WITH OBESITY: Primary | ICD-10-CM

## 2024-06-12 DIAGNOSIS — E11.9 TYPE 2 DIABETES MELLITUS WITHOUT COMPLICATION, WITHOUT LONG-TERM CURRENT USE OF INSULIN (HCC): Chronic | ICD-10-CM

## 2024-06-12 DIAGNOSIS — D64.9 NORMOCYTIC ANEMIA: ICD-10-CM

## 2024-06-12 DIAGNOSIS — E87.6 DRUG-INDUCED HYPOKALEMIA: Chronic | ICD-10-CM

## 2024-06-12 DIAGNOSIS — E66.9 LYMPHEDEMA ASSOCIATED WITH OBESITY: Primary | ICD-10-CM

## 2024-06-12 DIAGNOSIS — R63.5 WEIGHT GAIN: ICD-10-CM

## 2024-06-12 DIAGNOSIS — Z78.9 FULL CODE STATUS: ICD-10-CM

## 2024-06-12 DIAGNOSIS — T50.905A DRUG-INDUCED HYPOKALEMIA: Chronic | ICD-10-CM

## 2024-06-12 PROCEDURE — 99309 SBSQ NF CARE MODERATE MDM 30: CPT | Performed by: INTERNAL MEDICINE

## 2024-06-13 PROBLEM — R63.5 WEIGHT GAIN: Status: ACTIVE | Noted: 2024-06-13

## 2024-06-13 PROBLEM — D64.9 NORMOCYTIC ANEMIA: Status: ACTIVE | Noted: 2024-06-12

## 2024-06-13 PROBLEM — R63.5 WEIGHT GAIN: Status: ACTIVE | Noted: 2024-06-12

## 2024-06-14 NOTE — ASSESSMENT & PLAN NOTE
June 12, 2024: Hemoglobin A1c: 6.3%,   He is at his goal hemoglobin A1c of less than 8% given frailty and long-term care residence  Will continue with TLC and clinical/laboratory monitoring

## 2024-06-14 NOTE — PROGRESS NOTES
[facility]  [address]  [place] [address]  Facility: [facility]  [facility]  32  Follow-up visit    NAME: Stepan Mulligan  AGE: 73 y.o. SEX: male    DATE OF ENCOUNTER: June 12, 2024.    Code status:  CPR    Assessment and Plan     1. Lymphedema associated with obesity  Assessment & Plan:  He appears clinically stable  Will reset his target weight to be between 300 pounds and 310 pounds  Will continue with current diuretic therapy and local care with daily Ace wrapping  Will continue with clinical and periodic laboratory monitoring for change in his condition  2. Weight gain  Assessment & Plan:  Appears to be caloric in nature  See lymphedema heading for further information  Will continue with monitoring for change in his condition  3. Drug-induced hypokalemia  Assessment & Plan:  Secondary to chronic diuretic use  He is doing well with oral potassium replacement therapy  Will continue with periodic laboratory monitoring for change in his condition  4. Type 2 diabetes mellitus without complication, without long-term current use of insulin (HCC)  Assessment & Plan:  June 12, 2024: Hemoglobin A1c: 6.3%,   He is at his goal hemoglobin A1c of less than 8% given frailty and long-term care residence  Will continue with TLC and clinical/laboratory monitoring  5. Normocytic anemia  Assessment & Plan:  June 12, 2024: H/H/MCV: 11.5/34.5/89  He is asymptomatic  Will check follow-up laboratory testing  Further recommendations, pending results  6. Full code status    See my note of April 25, 2024 for further information.    All medications and routine orders were reviewed and updated as needed.    Plan discussed with: Patient and nursing staff.    Chief Complaint     He is seen for a follow-up visit to update the care and treatment of his chronic medical conditions.    History of Present Illness     He is a pleasant 73-year-old gentleman who is seen  for a follow-up visit to update care and treatment of his chronic medical, including type 2 diabetes mellitus, lymphedema, and weight gain.  He had laboratory testing performed earlier today.  Nursing staff endorses wrapping his legs on a daily basis.  He denies shortness of breath.  He does not care for the food at the facility.  He had blood work performed earlier today.  Nursing notified me that his weight was above his goal range.    The following portions of the patient's history were reviewed and updated as appropriate: current medications, past family history, past medical history, past social history, past surgical history and problem list.    Allergies:  No Known Allergies    Review of Systems     Review of Systems   Respiratory:  Negative for shortness of breath.        Medications and orders     All medications reviewed and updated in alf EMR.      Objective     Vitals: Recent vitals: Weight 301.5 pounds, pulse 59, blood pressure 116/59, fasting fingerstick blood sugar 126.    Physical Exam  Vitals reviewed. Exam conducted with a chaperone present.   Constitutional:       General: He is awake. He is not in acute distress.     Appearance: He is well-developed. He is not toxic-appearing or diaphoretic.      Comments: He appears comfortable lying in bed, stated age, and frail.   Cardiovascular:      Rate and Rhythm: Normal rate and regular rhythm.      Heart sounds: Normal heart sounds. No murmur heard.     No friction rub. No gallop.      Comments: Ace wraps are present on his legs.  There is brawny dependent lymphedema of both thighs.  Pulmonary:      Effort: Pulmonary effort is normal. No respiratory distress.      Breath sounds: Normal breath sounds. No stridor. No wheezing, rhonchi or rales.   Neurological:      Mental Status: He is alert.   Psychiatric:         Behavior: Behavior is cooperative.       Pertinent Laboratory/Diagnostic Studies:     The following labs were reviewed please see  "chart or hospital paperwork for details.    June 12, 2024:    CBC with differential: WBC count 5.8, hemoglobin 11.5, hematocrit 34.5, platelet count 149,000, MCV 89    Hemoglobin A1c: 6.3%,     TSH: 2.89    CMP: Sodium 146, potassium 3.8, bicarbonate 32, BUN 16, creatinine 1.23, fasting blood sugar 107, LFTs within normal limits except albumin 3.2 and total protein 5.6, EGFR 61    - New orders written and reviewed with nursing staff.      Portions of the record may have been created with voice recognition software.  Occasional wrong word or \"sound a like\" substitutions may have occurred due to the inherent limitations of voice recognition software.  Read the chart carefully and recognize, using context, where substitutions have occurred.    Mao Cross M.D.  6/13/2024 10:11 PM      "

## 2024-06-14 NOTE — ASSESSMENT & PLAN NOTE
June 12, 2024: H/H/MCV: 11.5/34.5/89  He is asymptomatic  Will check follow-up laboratory testing  Further recommendations, pending results

## 2024-06-14 NOTE — ASSESSMENT & PLAN NOTE
He appears clinically stable  Will reset his target weight to be between 300 pounds and 310 pounds  Will continue with current diuretic therapy and local care with daily Ace wrapping  Will continue with clinical and periodic laboratory monitoring for change in his condition

## 2024-06-14 NOTE — ASSESSMENT & PLAN NOTE
Appears to be caloric in nature  See lymphedema heading for further information  Will continue with monitoring for change in his condition

## 2024-06-14 NOTE — ASSESSMENT & PLAN NOTE
Secondary to chronic diuretic use  He is doing well with oral potassium replacement therapy  Will continue with periodic laboratory monitoring for change in his condition

## 2024-06-27 ENCOUNTER — NURSING HOME VISIT (OUTPATIENT)
Dept: GERIATRICS | Facility: OTHER | Age: 74
End: 2024-06-27
Payer: COMMERCIAL

## 2024-06-27 DIAGNOSIS — I69.354 HEMIPARESIS AFFECTING LEFT SIDE AS LATE EFFECT OF CEREBROVASCULAR ACCIDENT (HCC): Chronic | ICD-10-CM

## 2024-06-27 DIAGNOSIS — I15.2 HYPERTENSION ASSOCIATED WITH DIABETES  (HCC): Primary | Chronic | ICD-10-CM

## 2024-06-27 DIAGNOSIS — E11.9 TYPE 2 DIABETES MELLITUS WITHOUT COMPLICATION, WITHOUT LONG-TERM CURRENT USE OF INSULIN (HCC): Chronic | ICD-10-CM

## 2024-06-27 DIAGNOSIS — Z78.9 FULL CODE STATUS: ICD-10-CM

## 2024-06-27 DIAGNOSIS — G47.33 OBSTRUCTIVE SLEEP APNEA: ICD-10-CM

## 2024-06-27 DIAGNOSIS — G62.9 NEUROPATHY: ICD-10-CM

## 2024-06-27 DIAGNOSIS — E11.59 HYPERTENSION ASSOCIATED WITH DIABETES  (HCC): Primary | Chronic | ICD-10-CM

## 2024-06-27 PROCEDURE — 99309 SBSQ NF CARE MODERATE MDM 30: CPT | Performed by: PHYSICIAN ASSISTANT

## 2024-06-30 VITALS
HEART RATE: 56 BPM | TEMPERATURE: 97 F | SYSTOLIC BLOOD PRESSURE: 151 MMHG | RESPIRATION RATE: 20 BRPM | WEIGHT: 305 LBS | DIASTOLIC BLOOD PRESSURE: 76 MMHG | BODY MASS INDEX: 49.23 KG/M2

## 2024-06-30 NOTE — ASSESSMENT & PLAN NOTE
Lab Results   Component Value Date    HGBA1C 6.3 (H) 06/12/2024     Continue hydralazine, labetalol, lisinopril, torsemide

## 2024-06-30 NOTE — ASSESSMENT & PLAN NOTE
Lab Results   Component Value Date    HGBA1C 6.3 (H) 06/12/2024     Doing well without diabetic medications  Continue to monitor

## 2024-06-30 NOTE — PROGRESS NOTES
[facility]  [address] [address]  [phone]  [facility]  Code 32      NAME: Stepan Mulligan  AGE: 73 y.o. SEX: male [id number]    DATE OF ENCOUNTER: 6/27/24    CODE STATUS: CPR    Assessment and Plan     Problem List Items Addressed This Visit          Cardiovascular and Mediastinum    Hypertension associated with diabetes  (HCC) - Primary (Chronic)       Lab Results   Component Value Date    HGBA1C 6.3 (H) 06/12/2024     Continue hydralazine, labetalol, lisinopril, torsemide            Respiratory    Obstructive sleep apnea     Staff reports compliance with CPAP            Endocrine    Type 2 diabetes mellitus, without long-term current use of insulin (HCC) (Chronic)       Lab Results   Component Value Date    HGBA1C 6.3 (H) 06/12/2024     Doing well without diabetic medications  Continue to monitor            Nervous and Auditory    Hemiparesis affecting left side as late effect of cerebrovascular accident (HCC) (Chronic)     Requires 24/7 care in long term care facility           Neuropathy     Continue lyrica for pain            Other    Full code status       No orders of the defined types were placed in this encounter.          Chief Complaint     Chief Complaint   Patient presents with    Geriatric Evaluation     Follow up       History of Present Illness   73 year old male resident of [facility] being seen today in collaboration with nursing for follow up for chronic conditions. Patient feels well and has no new complaints. Nursing has no concerns         The following portions of the patient's history were reviewed and updated as appropriate: allergies, current medications, past family history, past medical history, past social history, past surgical history and problem list.    Review of Systems   Review of Systems   Constitutional: Negative.    HENT: Negative.     Eyes: Negative.    Respiratory: Negative.      Cardiovascular:  Positive for leg swelling.   Gastrointestinal: Negative.    Endocrine: Negative.    Genitourinary: Negative.    Musculoskeletal:  Positive for arthralgias and gait problem.   Hematological: Negative.    Psychiatric/Behavioral: Negative.            Active Problem List     Patient Active Problem List   Diagnosis    Hypertension associated with diabetes  ([facility])    History of hemorrhagic cerebrovascular accident (CVA) with residual deficit    Lymphedema associated with obesity    Hemiparesis affecting left side as late effect of cerebrovascular accident ([facility])    Type 2 diabetes mellitus, without long-term current use of insulin ([facility])    Anemia of chronic disease    Obstructive sleep apnea    Hyperlipidemia    Drug-induced constipation    History of gastroesophageal reflux (GERD)    Neuropathy    Obesity    POLST (Physician Orders for Life-Sustaining Treatment)    Goals of care, counseling/discussion    Medication management    Full code status    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)    Frailty syndrome in geriatric patient    Drug-induced hypokalemia    Body mass index (BMI) of 40.0 to 44.9 in adult ([facility])    Resting tremor    Major depressive disorder, recurrent, mild ([facility])    Normocytic anemia    Weight gain         Objective     /76   Pulse 56   Temp (!) 97 °F (36.1 °C)   Resp 20   Wt (!) 138 kg (305 lb)   BMI 49.23 kg/m²     Physical Exam  Vitals reviewed.   Constitutional:       General: He is not in acute distress.     Appearance: He is obese. He is not ill-appearing or diaphoretic.   HENT:      Head: Normocephalic and atraumatic.      Nose: Nose normal.      Mouth/Throat:      Pharynx: Oropharynx is clear.   Cardiovascular:      Rate and Rhythm: Normal rate.   Pulmonary:      Effort: Pulmonary effort is normal. No respiratory distress.      Breath sounds: Normal breath sounds.   Abdominal:      General: Bowel sounds are normal. There is no distension.      Palpations: Abdomen is  soft.      Tenderness: There is no abdominal tenderness.   Musculoskeletal:         General: No deformity or signs of injury.   Skin:     General: Skin is warm and dry.      Findings: No bruising or erythema.   Neurological:      Mental Status: He is alert and oriented to person, place, and time. Mental status is at baseline.         Pertinent Laboratory/Diagnostic Studies:    6/12/24  Hgb 11.5  Wbc 5.8  A1c 6.3  TSH 2.89  Creat 1.23    Current Medications   Medications reviewed and updated in facility chart.

## 2024-08-27 ENCOUNTER — NURSING HOME VISIT (OUTPATIENT)
Dept: GERIATRICS | Facility: OTHER | Age: 74
End: 2024-08-27
Payer: COMMERCIAL

## 2024-08-27 DIAGNOSIS — E66.9 LYMPHEDEMA ASSOCIATED WITH OBESITY: ICD-10-CM

## 2024-08-27 DIAGNOSIS — E11.9 TYPE 2 DIABETES MELLITUS WITHOUT COMPLICATION, WITHOUT LONG-TERM CURRENT USE OF INSULIN (HCC): Chronic | ICD-10-CM

## 2024-08-27 DIAGNOSIS — E87.6 DRUG-INDUCED HYPOKALEMIA: Chronic | ICD-10-CM

## 2024-08-27 DIAGNOSIS — R54 FRAILTY SYNDROME IN GERIATRIC PATIENT: Chronic | ICD-10-CM

## 2024-08-27 DIAGNOSIS — I89.0 LYMPHEDEMA ASSOCIATED WITH OBESITY: ICD-10-CM

## 2024-08-27 DIAGNOSIS — T50.905A DRUG-INDUCED HYPOKALEMIA: Chronic | ICD-10-CM

## 2024-08-27 DIAGNOSIS — G47.33 OBSTRUCTIVE SLEEP APNEA: ICD-10-CM

## 2024-08-27 DIAGNOSIS — E11.59 HYPERTENSION ASSOCIATED WITH DIABETES  (HCC): Chronic | ICD-10-CM

## 2024-08-27 DIAGNOSIS — I15.2 HYPERTENSION ASSOCIATED WITH DIABETES  (HCC): Chronic | ICD-10-CM

## 2024-08-27 DIAGNOSIS — Z78.9 FULL CODE STATUS: ICD-10-CM

## 2024-08-27 DIAGNOSIS — I69.354 HEMIPARESIS AFFECTING LEFT SIDE AS LATE EFFECT OF CEREBROVASCULAR ACCIDENT (HCC): Primary | Chronic | ICD-10-CM

## 2024-08-27 PROCEDURE — 99309 SBSQ NF CARE MODERATE MDM 30: CPT | Performed by: INTERNAL MEDICINE

## 2024-09-24 PROBLEM — E53.8 VITAMIN B12 DEFICIENCY: Status: ACTIVE | Noted: 2024-09-24

## 2024-09-24 RX ORDER — UREA 10 %
1000 LOTION (ML) TOPICAL DAILY
COMMUNITY
Start: 2024-06-27

## 2024-09-24 NOTE — ASSESSMENT & PLAN NOTE
Will continue with supportive measures of Ace wrapping of his legs and elevation, as able  Will continue with current torsemide dosage  Will continue with monitoring for change in his condition

## 2024-09-24 NOTE — PROGRESS NOTES
[facility]  [address]  [place] [address]  Facility: [facility]  [facility]  32  Follow-up visit    NAME: Stepan Mulligan  AGE: 73 y.o. SEX: male    DATE OF ENCOUNTER: August 27, 2024.    Code status:  CPR    Assessment and Plan     1. Hemiparesis affecting left side as late effect of cerebrovascular accident (HCC)  Assessment & Plan:  Secondary to her prior hemorrhagic CVA  Will continue with secondary prevention  Will continue with 24/7 care/support of his ADLs at the nursing facility  Will continue to monitor for change in his condition  2. Hypertension associated with diabetes  (HCC)  Assessment & Plan:  His blood pressure has been doing well with labetalol and hydralazine  Will continue with this care plan  Will continue with monitoring for change in his condition  3. Type 2 diabetes mellitus without complication, without long-term current use of insulin (HCC)  Assessment & Plan:  Review of his fingerstick blood sugar log looks well with TLC  Will continue with clinical and periodic laboratory monitoring for changes condition with target hemoglobin A1c less than 8% given frailty and long-term care residence  4. Frailty syndrome in geriatric patient  Assessment & Plan:  Secondary to his chronic medical conditions  He continues to require 24/7 care/support of his ADLs  I recommend continued care/support of his ADLs as a long-term resident at the nursing facility  Will continue to monitor for change in his condition  5. Drug-induced hypokalemia  Assessment & Plan:  Secondary to chronic torsemide usage  He is doing well with oral potassium supplement  Will continue with periodic laboratory monitoring for change in his condition  6. Lymphedema associated with obesity  Assessment & Plan:  Will continue with supportive measures of Ace wrapping of his legs and elevation, as able  Will continue with current torsemide dosage  Will continue with  monitoring for change in his condition  7. Obstructive sleep apnea  Assessment & Plan:  He continues on nightly CPAP usage  Will continue with monitoring for changes condition  8. Full code status    See my note of April 25, 2024 for further information.    All medications and routine orders were reviewed and updated as needed.    Plan discussed with: Patient and nursing staff.    Chief Complaint     He is seen for a follow-up visit to update the care and treatment of his chronic medical conditions.    History of Present Illness     He is a pleasant 73-year-old gentleman who is seen for a follow-up visit to update the care and treatment of his chronic medical conditions, including left-sided hemiparesis secondary to prior hemorrhagic CVA, hypertension, type 2 diabetes mellitus, and frailty secondary to his chronic medical conditions.    Nursing staff reports that his overall clinical/functional status is stable, that his appetite is stable, that he is sleeping well, and that he is having no difficulty with his bowel movements.  Nursing reports he is not exhibiting any behaviors are distressing to him or disruptive to the nursing unit.    He denies shortness of breath and paroxysmal nocturnal dyspnea.    The following portions of the patient's history were reviewed and updated as appropriate: current medications, past family history, past medical history, past social history, past surgical history and problem list.    Allergies:  No Known Allergies    Review of Systems     Review of Systems   Respiratory:  Negative for shortness of breath.        Medications and orders     All medications reviewed and updated in [facility] EMR.      Objective     Vitals: Recent vitals: Weight 316.5 pounds, pulse 58, blood pressure 130/59, fasting fingerstick blood sugar 125.    Physical Exam  Vitals reviewed.   Constitutional:       General: He is awake. He is not in acute distress.     Appearance: He is well-developed. He is not  "toxic-appearing or diaphoretic.      Comments: He appears comfortable lying in bed, stated age, and frail.   Cardiovascular:      Rate and Rhythm: Normal rate and regular rhythm.      Heart sounds: Normal heart sounds. No murmur heard.     No friction rub. No gallop.      Comments: Calves are supple.  There is some mild lymphedema of bilateral posterior thighs.  Pulmonary:      Effort: No respiratory distress.      Breath sounds: Normal breath sounds. No stridor. No wheezing, rhonchi or rales.   Neurological:      Mental Status: He is alert.   Psychiatric:         Mood and Affect: Mood normal.         Behavior: Behavior normal. Behavior is cooperative.       Pertinent Laboratory/Diagnostic Studies:     The following labs were reviewed please see chart or hospital paperwork for details.    August 7, 2024:    BMP: Sodium 147, potassium 3.8, BUN 17, creatinine 1.27, fasting blood sugar 112, EGFR 59    - His order summary was reviewed and signed.      Portions of the record may have been created with voice recognition software.  Occasional wrong word or \"sound a like\" substitutions may have occurred due to the inherent limitations of voice recognition software.  Read the chart carefully and recognize, using context, where substitutions have occurred.    Mao Cross M.D.  9/24/2024 8:34 AM      "

## 2024-09-24 NOTE — ASSESSMENT & PLAN NOTE
Secondary to her prior hemorrhagic CVA  Will continue with secondary prevention  Will continue with 24/7 care/support of his ADLs at the nursing facility  Will continue to monitor for change in his condition

## 2024-09-24 NOTE — ASSESSMENT & PLAN NOTE
Review of his fingerstick blood sugar log looks well with TLC  Will continue with clinical and periodic laboratory monitoring for changes condition with target hemoglobin A1c less than 8% given frailty and long-term care residence

## 2024-09-24 NOTE — ASSESSMENT & PLAN NOTE
Secondary to chronic torsemide usage  He is doing well with oral potassium supplement  Will continue with periodic laboratory monitoring for change in his condition

## 2024-09-24 NOTE — ASSESSMENT & PLAN NOTE
His blood pressure has been doing well with labetalol and hydralazine  Will continue with this care plan  Will continue with monitoring for change in his condition

## 2024-10-24 ENCOUNTER — NURSING HOME VISIT (OUTPATIENT)
Dept: GERIATRICS | Facility: OTHER | Age: 74
End: 2024-10-24
Payer: COMMERCIAL

## 2024-10-24 DIAGNOSIS — E66.9 OBESITY, UNSPECIFIED CLASS, UNSPECIFIED OBESITY TYPE, UNSPECIFIED WHETHER SERIOUS COMORBIDITY PRESENT: ICD-10-CM

## 2024-10-24 DIAGNOSIS — I15.2 HYPERTENSION ASSOCIATED WITH DIABETES  (HCC): Chronic | ICD-10-CM

## 2024-10-24 DIAGNOSIS — I69.354 HEMIPARESIS AFFECTING LEFT SIDE AS LATE EFFECT OF CEREBROVASCULAR ACCIDENT (HCC): Chronic | ICD-10-CM

## 2024-10-24 DIAGNOSIS — E11.9 TYPE 2 DIABETES MELLITUS WITHOUT COMPLICATION, WITHOUT LONG-TERM CURRENT USE OF INSULIN (HCC): Chronic | ICD-10-CM

## 2024-10-24 DIAGNOSIS — G62.9 NEUROPATHY: ICD-10-CM

## 2024-10-24 DIAGNOSIS — G47.33 OBSTRUCTIVE SLEEP APNEA: ICD-10-CM

## 2024-10-24 DIAGNOSIS — T50.905A DRUG-INDUCED HYPOKALEMIA: Chronic | ICD-10-CM

## 2024-10-24 DIAGNOSIS — F33.0 MAJOR DEPRESSIVE DISORDER, RECURRENT, MILD (HCC): ICD-10-CM

## 2024-10-24 DIAGNOSIS — I89.0 LYMPHEDEMA ASSOCIATED WITH OBESITY: ICD-10-CM

## 2024-10-24 DIAGNOSIS — E11.59 HYPERTENSION ASSOCIATED WITH DIABETES  (HCC): Chronic | ICD-10-CM

## 2024-10-24 DIAGNOSIS — E78.5 HYPERLIPIDEMIA, UNSPECIFIED HYPERLIPIDEMIA TYPE: Primary | ICD-10-CM

## 2024-10-24 DIAGNOSIS — E66.9 LYMPHEDEMA ASSOCIATED WITH OBESITY: ICD-10-CM

## 2024-10-24 DIAGNOSIS — E53.8 VITAMIN B12 DEFICIENCY: ICD-10-CM

## 2024-10-24 DIAGNOSIS — E87.6 DRUG-INDUCED HYPOKALEMIA: Chronic | ICD-10-CM

## 2024-10-24 PROCEDURE — 99309 SBSQ NF CARE MODERATE MDM 30: CPT

## 2024-10-24 RX ORDER — HYDRALAZINE HYDROCHLORIDE 10 MG/1
10 TABLET, FILM COATED ORAL 4 TIMES DAILY
COMMUNITY

## 2024-10-24 RX ORDER — HYDROCORTISONE 1 %
1 SOLUTION, NON-ORAL TOPICAL
COMMUNITY

## 2024-10-24 RX ORDER — AMLODIPINE BESYLATE 5 MG/1
5 TABLET ORAL DAILY
COMMUNITY

## 2024-10-24 NOTE — ASSESSMENT & PLAN NOTE
No edema on exam  Continue Ace wraps to bilateral lower extremities  Continue torsemide at current dose  Continue to monitor for acute changes in patient condition

## 2024-10-24 NOTE — ASSESSMENT & PLAN NOTE
Left-sided hemiparesis secondary to prior hemorrhagic CVA  Continue with 24/7 care/support of ADLs at the nursing facility  Continue to monitor for acute changes in patient condition

## 2024-10-24 NOTE — ASSESSMENT & PLAN NOTE
Continue current regimen with amlodipine, hydralazine, labetalol, and lisinopril  Blood pressures remain stable, continue checking daily at this time  Continue to monitor for acute changes in patient condition

## 2024-10-24 NOTE — ASSESSMENT & PLAN NOTE
Continue to monitor intermittent labs to assess electrolytes  Potassium level 4.1 on 10/2/24  Continue current potassium supplement along with torsemide

## 2024-10-24 NOTE — PROGRESS NOTES
[facility]  [address]  [place] [address]  Facility: [facility]  [facility]  Long Term Care: POS 32    NAME: Stepan Mulligan  AGE: 74 y.o. SEX: male    DATE OF ENCOUNTER: 10/24/24    Code status:  CPR    Assessment and Plan     1. Hyperlipidemia, unspecified hyperlipidemia type  Assessment & Plan:  Lipid panel stable on 10/2/24  Continue atorvastatin  Intermittent laboratory monitoring  2. Hypertension associated with diabetes  (HCC)  Assessment & Plan:  Continue current regimen with amlodipine, hydralazine, labetalol, and lisinopril  Blood pressures remain stable, continue checking daily at this time  Continue to monitor for acute changes in patient condition  3. Obstructive sleep apnea  Assessment & Plan:  Uses CPAP daily HS  4. Type 2 diabetes mellitus without complication, without long-term current use of insulin (HCC)  Assessment & Plan:  Fasting blood sugars appear stable on monthly checks  Most recent HA1C 6.5%  Periodic laboratory monitoring to monitor for acute changes  Target hemoglobin A1C less than 8% given frailty and long term care residence  5. Hemiparesis affecting left side as late effect of cerebrovascular accident (HCC)  Assessment & Plan:  Left-sided hemiparesis secondary to prior hemorrhagic CVA  Continue with 24/7 care/support of ADLs at the nursing facility  Continue to monitor for acute changes in patient condition  6. Neuropathy  Assessment & Plan:  Continue lyrica for pain, stable  7. Major depressive disorder, recurrent, mild (HCC)  Assessment & Plan:  Care in collaboration with psychiatry service  Continue Lyrica for depression and pain    8. Drug-induced hypokalemia  Assessment & Plan:  Continue to monitor intermittent labs to assess electrolytes  Potassium level 4.1 on 10/2/24  Continue current potassium supplement along with torsemide  9. Lymphedema associated with obesity  Assessment & Plan:  No edema on  exam  Continue Ace wraps to bilateral lower extremities  Continue torsemide at current dose  Continue to monitor for acute changes in patient condition  10. Vitamin B12 deficiency  Assessment & Plan:  Continue vitamin B12 po supplements  Intermittent laboratory monitoring  11. Obesity, unspecified class, unspecified obesity type, unspecified whether serious comorbidity present  Assessment & Plan:  Weights have trended down recently  Target weight goal adjusted to notify provider if outside range of 100-110 lbs  No edema on exam, continue to elevate legs  Patient often snacking on cookies that his wife brings him  Encourage healthy eating and portion control      All medications and routine orders were reviewed and updated as needed.    Plan discussed with: Patient    Chief Complaint     Follow-up of chronic conditions    History of Present Illness     Patient was seen today to follow-up on chronic medical conditions and to address any concerns.  Patient appears to be doing well with no pain.  His weight has remained stable. He is sleeping well at night and has no problems voiding or having Bms. Patient's wife often visits and brings him cookies. He wishes he could lose weight, but has no other concerns.    The following portions of the patient's history were reviewed and updated as appropriate: current medications, past family history, past medical history, past social history, past surgical history and problem list.    Allergies:  No Known Allergies    Review of Systems     Review of Systems   Cardiovascular:  Positive for leg swelling.   Musculoskeletal:  Positive for gait problem.   Neurological:  Positive for weakness.   All other systems reviewed and are negative.      Medications and orders     All medications reviewed and updated in snf EMR.      Objective     Vitals: /54, temp 96.7 °F, HR 58, RR 18, O2 95%, weight 305 pounds    Physical Exam  Vitals and nursing note reviewed.   Constitutional:  "      General: He is awake. He is not in acute distress.     Appearance: Normal appearance. He is obese. He is not ill-appearing or diaphoretic.   HENT:      Head: Normocephalic and atraumatic.   Cardiovascular:      Rate and Rhythm: Normal rate and regular rhythm.      Heart sounds: Normal heart sounds, S1 normal and S2 normal. No murmur heard.  Pulmonary:      Effort: Pulmonary effort is normal. No respiratory distress.      Breath sounds: Normal breath sounds.   Abdominal:      General: Bowel sounds are normal. There is no distension.      Palpations: Abdomen is soft.      Tenderness: There is no abdominal tenderness.   Musculoskeletal:      Right lower leg: No edema.      Left lower leg: No edema.   Skin:     General: Skin is warm and dry.   Neurological:      Mental Status: He is alert and oriented to person, place, and time. Mental status is at baseline.      Motor: Weakness present.      Gait: Gait abnormal.   Psychiatric:         Attention and Perception: Attention and perception normal.         Mood and Affect: Mood and affect normal.         Speech: Speech normal.         Behavior: Behavior normal. Behavior is cooperative.       Pertinent Laboratory/Diagnostic Studies:     The following labs were reviewed please see chart or hospital paperwork for details.    10/2/24:  Hbg 13.0  Hct 39.2  Wbc 6.4  Plt 171  Bun 14  Crt 1.16  Na 146  K 4.1  Gfr 66  Glucose 114  CO2 34  Vit B12 905  HA1C 6.5%    Portions of the record may have been created with voice recognition software.  Occasional wrong word or \"sound a like\" substitutions may have occurred due to the inherent limitations of voice recognition software.  Read the chart carefully and recognize, using context, where substitutions have occurred.    KESNIA Benton  Geriatric Medicine  10/24/2024 2:09 PM      "

## 2024-10-24 NOTE — ASSESSMENT & PLAN NOTE
Weights have trended down recently  Target weight goal adjusted to notify provider if outside range of 100-110 lbs  No edema on exam, continue to elevate legs  Patient often snacking on cookies that his wife brings him  Encourage healthy eating and portion control

## 2024-10-24 NOTE — ASSESSMENT & PLAN NOTE
Fasting blood sugars appear stable on monthly checks  Most recent HA1C 6.5%  Periodic laboratory monitoring to monitor for acute changes  Target hemoglobin A1C less than 8% given frailty and long term care residence

## 2024-12-19 ENCOUNTER — NURSING HOME VISIT (OUTPATIENT)
Dept: GERIATRICS | Facility: OTHER | Age: 74
End: 2024-12-19
Payer: COMMERCIAL

## 2024-12-19 DIAGNOSIS — I15.2 HYPERTENSION ASSOCIATED WITH DIABETES  (HCC): Chronic | ICD-10-CM

## 2024-12-19 DIAGNOSIS — E66.9 LYMPHEDEMA ASSOCIATED WITH OBESITY: ICD-10-CM

## 2024-12-19 DIAGNOSIS — R54 FRAILTY SYNDROME IN GERIATRIC PATIENT: Primary | Chronic | ICD-10-CM

## 2024-12-19 DIAGNOSIS — Z78.9 FULL CODE STATUS: ICD-10-CM

## 2024-12-19 DIAGNOSIS — E11.59 HYPERTENSION ASSOCIATED WITH DIABETES  (HCC): Chronic | ICD-10-CM

## 2024-12-19 DIAGNOSIS — I69.30 HISTORY OF HEMORRHAGIC CEREBROVASCULAR ACCIDENT (CVA) WITH RESIDUAL DEFICIT: Chronic | ICD-10-CM

## 2024-12-19 DIAGNOSIS — I69.354 HEMIPARESIS AFFECTING LEFT SIDE AS LATE EFFECT OF CEREBROVASCULAR ACCIDENT (HCC): Chronic | ICD-10-CM

## 2024-12-19 DIAGNOSIS — I89.0 LYMPHEDEMA ASSOCIATED WITH OBESITY: ICD-10-CM

## 2024-12-19 DIAGNOSIS — E66.813 CLASS 3 SEVERE OBESITY DUE TO EXCESS CALORIES WITH SERIOUS COMORBIDITY AND BODY MASS INDEX (BMI) OF 50.0 TO 59.9 IN ADULT (HCC): ICD-10-CM

## 2024-12-19 DIAGNOSIS — G47.33 OBSTRUCTIVE SLEEP APNEA: ICD-10-CM

## 2024-12-19 DIAGNOSIS — T50.905A DRUG-INDUCED HYPOKALEMIA: Chronic | ICD-10-CM

## 2024-12-19 DIAGNOSIS — E66.01 CLASS 3 SEVERE OBESITY DUE TO EXCESS CALORIES WITH SERIOUS COMORBIDITY AND BODY MASS INDEX (BMI) OF 50.0 TO 59.9 IN ADULT (HCC): ICD-10-CM

## 2024-12-19 DIAGNOSIS — E78.5 HYPERLIPIDEMIA, UNSPECIFIED HYPERLIPIDEMIA TYPE: ICD-10-CM

## 2024-12-19 DIAGNOSIS — E11.9 TYPE 2 DIABETES MELLITUS WITHOUT COMPLICATION, WITHOUT LONG-TERM CURRENT USE OF INSULIN (HCC): Chronic | ICD-10-CM

## 2024-12-19 DIAGNOSIS — E87.6 DRUG-INDUCED HYPOKALEMIA: Chronic | ICD-10-CM

## 2024-12-19 PROCEDURE — 99310 SBSQ NF CARE HIGH MDM 45: CPT | Performed by: INTERNAL MEDICINE

## 2024-12-22 PROBLEM — E66.01 CLASS 3 SEVERE OBESITY DUE TO EXCESS CALORIES WITH SERIOUS COMORBIDITY AND BODY MASS INDEX (BMI) OF 50.0 TO 59.9 IN ADULT (HCC): Status: ACTIVE | Noted: 2019-12-19

## 2024-12-22 PROBLEM — E66.813 CLASS 3 SEVERE OBESITY DUE TO EXCESS CALORIES WITH SERIOUS COMORBIDITY AND BODY MASS INDEX (BMI) OF 50.0 TO 59.9 IN ADULT (HCC): Status: ACTIVE | Noted: 2019-12-19

## 2024-12-22 RX ORDER — CHLORTHALIDONE 25 MG/1
25 TABLET ORAL DAILY
COMMUNITY
Start: 2024-12-20

## 2024-12-23 NOTE — ASSESSMENT & PLAN NOTE
Will continue with 24/7 care/support of his ADLs at the nursing facility  He is doing well with his current antihypertensive therapy  Will attempt to simplify his antihypertensive medication regimen: Will start chlorthalidone 25 mg daily, discontinue routine hydralazine 10 mg 4 times daily, and start hydralazine 10 mg 4 times daily as needed for a systolic blood pressure greater than 140  Will continue with clinical and periodic laboratory monitoring for change in his condition

## 2024-12-23 NOTE — ASSESSMENT & PLAN NOTE
October 2, 2024: Hemoglobin A1c: 6.5%,   IN shared decision making with he and his wife, will start and uptitrate Ozempic every 4 weeks  Will continue with monitoring for changes condition

## 2024-12-23 NOTE — ASSESSMENT & PLAN NOTE
October 2, 2024: Fasting lipid profile: Total cholesterol 98, triglycerides 131, HDL 28, LDL 44  He is doing well with his current dosage of atorvastatin 10 mg daily  Will continue with this care plan  Will continue with periodic laboratory monitoring for changes condition

## 2024-12-23 NOTE — ASSESSMENT & PLAN NOTE
Will continue with TLC in collaboration with the facility dietary service  In shared decision making, we will start Ozempic 0 25 mg weekly x 4 weeks, then 0.5 mg weekly for 4 weeks, and then 1 mg weekly  Will continue with monitoring for changes condition

## 2024-12-23 NOTE — PROGRESS NOTES
[facility]  [address]  [place] [address]  Facility: [facility]  [facility]  32  Follow-up visit    NAME: Stepan Mulligan  AGE: 74 y.o. SEX: male    DATE OF ENCOUNTER: December 19, 2024.    Code status:  CPR    Assessment and Plan     1. Frailty syndrome in geriatric patient  Assessment & Plan:  Secondary to his chronic medical conditions  He continues to require 24/7 care/support of his ADLs  I recommend continued care/support of his ADLs as a long-term resident at the nursing facility  Will continue to monitor for change in his condition  2. Type 2 diabetes mellitus without complication, without long-term current use of insulin ([facility])  Assessment & Plan:  October 2, 2024: Hemoglobin A1c: 6.5%,   IN shared decision making with he and his wife, will start and uptitrate Ozempic every 4 weeks  Will continue with monitoring for changes condition  3. Hypertension associated with diabetes  (HCC)  Assessment & Plan:  He is doing well with his current antihypertensive therapy  Will attempt to simplify his antihypertensive medication regimen: Will start chlorthalidone 25 mg daily, discontinue routine hydralazine 10 mg 4 times daily, and start hydralazine 10 mg 4 times daily as needed for a systolic blood pressure greater than 140  Will continue with clinical and periodic laboratory monitoring for change in his condition  Lab Results   Component Value Date    HGBA1C 6.3 (H) 06/12/2024     4. Class 3 severe obesity due to excess calories with serious comorbidity and body mass index (BMI) of 50.0 to 59.9 in adult (HCC)  Assessment & Plan:  Will continue with TLC in collaboration with the facility dietary service  In shared decision making, we will start Ozempic 0 25 mg weekly x 4 weeks, then 0.5 mg weekly for 4 weeks, and then 1 mg weekly  Will continue with monitoring for changes condition  5. Hemiparesis affecting left side as late effect of  cerebrovascular accident (HCC)  Assessment & Plan:  Will continue to provide 24/7 care/support of his ADLs at the nursing facility  Will continue with secondary stroke prevention of atorvastatin and antihypertensive therapy  Will continue his care in collaboration with his neurology service with his last office visit being December 28, 2022 with recommendation of follow-up, as needed  Will continue with monitoring for changes condition  6. Drug-induced hypokalemia  Assessment & Plan:  Secondary to chronic diuretic use for his lower extremity edema/lymphedema  He is doing well with oral potassium replacement therapy  Will continue with periodic laboratory monitoring for changes condition  7. Hyperlipidemia, unspecified hyperlipidemia type  Assessment & Plan:  October 2, 2024: Fasting lipid profile: Total cholesterol 98, triglycerides 131, HDL 28, LDL 44  He is doing well with his current dosage of atorvastatin 10 mg daily  Will continue with this care plan  Will continue with periodic laboratory monitoring for changes condition  8. Lymphedema associated with obesity  Assessment & Plan:  He appears clinically  Weight gain likely secondary to caloric intake  We will reset his target weight to be between 323 pounds in 333 pounds  Hopefully, with starting and uptitrating Ozempic he will be able to lose weight  Will continue with monitoring for changes condition  9. Obstructive sleep apnea  Assessment & Plan:  He continues with use of his CPAP machine nightly  10. History of hemorrhagic cerebrovascular accident (CVA) with residual deficit  Assessment & Plan:  Will continue with 24/7 care/support of his ADLs at the nursing facility  He is doing well with his current antihypertensive therapy  Will attempt to simplify his antihypertensive medication regimen: Will start chlorthalidone 25 mg daily, discontinue routine hydralazine 10 mg 4 times daily, and start hydralazine 10 mg 4 times daily as needed for a systolic blood  pressure greater than 140  Will continue with clinical and periodic laboratory monitoring for change in his condition  11. Full code status      All medications and routine orders were reviewed and updated as needed.    Plan discussed with: Patient, nursing staff, and wife.    I have spent a total time of 55 minutes in caring for this patient on the day of the visit/encounter including Diagnostic results, Prognosis, Risks and benefits of tx options, Instructions for management, Patient and family education, Risk factor reductions, Impressions, Counseling / Coordination of care, Documenting in the medical record, Reviewing / ordering tests, medicine, procedures  , Obtaining or reviewing history  , and Communicating with other healthcare professionals .     Chief Complaint     He is seen for a follow-up visit to update the care treatment of his chronic medical conditions.    History of Present Illness     He is a 74-year-old gentleman who is seen for a follow-up visit to update the care treatment of his chronic medical conditions, including type 2 diabetes mellitus, hypertension, obesity, and frailty secondary to his chronic medical conditions.    The nursing staff reports that his overall clinical/functional status is stable and that he is not exhibiting any behaviors that are distressing to him or disruptive to the nursing unit.    His only concern is about his increased weight.     The following portions of the patient's history were reviewed and updated as appropriate: current medications, past family history, past medical history, past social history, past surgical history and problem list.    Allergies:  No Known Allergies    Review of Systems     Review of Systems   Constitutional:  Positive for appetite change (Does not like the facility food).   Respiratory:  Negative for shortness of breath.    Cardiovascular:  Negative for leg swelling.       Medications and orders     All medications reviewed and updated  "in Nursing Home EMR.      Objective     Vitals: Recent vitals: Weight 328 pounds, pulse 51, blood pressure 110/54, fasting fingerstick blood sugar 142, height 66 inches, BMI 53    Physical Exam  Vitals reviewed.   Constitutional:       General: He is awake. He is not in acute distress.     Appearance: He is well-developed. He is not toxic-appearing or diaphoretic.      Comments: He appears comfortable lying in bed, stated age, and frail.   Cardiovascular:      Rate and Rhythm: Normal rate and regular rhythm.      Heart sounds: Normal heart sounds. No murmur heard.     No friction rub.      Comments: Mild bilateral dependent thigh lymphedema.  No pretibial edema, bilaterally.  Pulmonary:      Effort: No respiratory distress.      Breath sounds: Normal breath sounds. No stridor. No wheezing, rhonchi or rales.   Neurological:      Mental Status: He is alert.   Psychiatric:         Behavior: Behavior is cooperative.       Pertinent Laboratory/Diagnostic Studies:     The following labs were reviewed please see chart or hospital paperwork for details.    October 2, 2024:    CBC with differential: WBC count 6.4, hemoglobin 13, hematocrit 39.2, platelet count 171,000, MCV 91    CMP: Sodium 146, potassium 4.1, BUN 14, creatinine 1.16, fasting blood sugar 114, EGFR 66, LFTs within normal limits.    Fasting lipid profile: Total cholesterol 98, triglycerides 131, HDL 28, LDL 44    Vit B12 level: 905    Hemoglobin A1c: 6.5%,     Urine microalbumin/creatinine ratio: Unable to calculate secondary to low albumin    - His order summary was      Portions of the record may have been created with voice recognition software.  Occasional wrong word or \"sound a like\" substitutions may have occurred due to the inherent limitations of voice recognition software.  Read the chart carefully and recognize, using context, where substitutions have occurred.    Mao Cross M.D.        "

## 2024-12-23 NOTE — ASSESSMENT & PLAN NOTE
He appears clinically  Weight gain likely secondary to caloric intake  We will reset his target weight to be between 323 pounds in 333 pounds  Hopefully, with starting and uptitrating Ozempic he will be able to lose weight  Will continue with monitoring for changes condition

## 2024-12-23 NOTE — ASSESSMENT & PLAN NOTE
Will continue to provide 24/7 care/support of his ADLs at the nursing facility  Will continue with secondary stroke prevention of atorvastatin and antihypertensive therapy  Will continue his care in collaboration with his neurology service with his last office visit being December 28, 2022 with recommendation of follow-up, as needed  Will continue with monitoring for changes condition

## 2024-12-23 NOTE — ASSESSMENT & PLAN NOTE
He is doing well with his current antihypertensive therapy  Will attempt to simplify his antihypertensive medication regimen: Will start chlorthalidone 25 mg daily, discontinue routine hydralazine 10 mg 4 times daily, and start hydralazine 10 mg 4 times daily as needed for a systolic blood pressure greater than 140  Will continue with clinical and periodic laboratory monitoring for change in his condition  Lab Results   Component Value Date    HGBA1C 6.3 (H) 06/12/2024

## 2024-12-23 NOTE — ASSESSMENT & PLAN NOTE
Secondary to chronic diuretic use for his lower extremity edema/lymphedema  He is doing well with oral potassium replacement therapy  Will continue with periodic laboratory monitoring for changes condition

## 2025-02-19 ENCOUNTER — DOCUMENTATION (OUTPATIENT)
Dept: GERIATRICS | Facility: OTHER | Age: 75
End: 2025-02-19

## 2025-02-19 ENCOUNTER — NURSING HOME VISIT (OUTPATIENT)
Dept: GERIATRICS | Facility: OTHER | Age: 75
End: 2025-02-19
Payer: COMMERCIAL

## 2025-02-19 DIAGNOSIS — E11.9 TYPE 2 DIABETES MELLITUS WITHOUT COMPLICATION, WITHOUT LONG-TERM CURRENT USE OF INSULIN (HCC): Chronic | ICD-10-CM

## 2025-02-19 DIAGNOSIS — G47.33 OBSTRUCTIVE SLEEP APNEA: ICD-10-CM

## 2025-02-19 DIAGNOSIS — E87.6 DRUG-INDUCED HYPOKALEMIA: Chronic | ICD-10-CM

## 2025-02-19 DIAGNOSIS — R54 FRAILTY SYNDROME IN GERIATRIC PATIENT: Chronic | ICD-10-CM

## 2025-02-19 DIAGNOSIS — I69.354 HEMIPARESIS AFFECTING LEFT SIDE AS LATE EFFECT OF CEREBROVASCULAR ACCIDENT (HCC): Primary | Chronic | ICD-10-CM

## 2025-02-19 DIAGNOSIS — I15.2 HYPERTENSION ASSOCIATED WITH DIABETES  (HCC): Primary | Chronic | ICD-10-CM

## 2025-02-19 DIAGNOSIS — E66.813 CLASS 3 SEVERE OBESITY DUE TO EXCESS CALORIES WITH SERIOUS COMORBIDITY AND BODY MASS INDEX (BMI) OF 45.0 TO 49.9 IN ADULT (HCC): ICD-10-CM

## 2025-02-19 DIAGNOSIS — T50.905A DRUG-INDUCED HYPOKALEMIA: Chronic | ICD-10-CM

## 2025-02-19 DIAGNOSIS — I15.2 HYPERTENSION ASSOCIATED WITH DIABETES  (HCC): Chronic | ICD-10-CM

## 2025-02-19 DIAGNOSIS — Z78.9 FULL CODE STATUS: ICD-10-CM

## 2025-02-19 DIAGNOSIS — E11.59 HYPERTENSION ASSOCIATED WITH DIABETES  (HCC): Primary | Chronic | ICD-10-CM

## 2025-02-19 DIAGNOSIS — E66.01 CLASS 3 SEVERE OBESITY DUE TO EXCESS CALORIES WITH SERIOUS COMORBIDITY AND BODY MASS INDEX (BMI) OF 45.0 TO 49.9 IN ADULT (HCC): ICD-10-CM

## 2025-02-19 DIAGNOSIS — E11.59 HYPERTENSION ASSOCIATED WITH DIABETES  (HCC): Chronic | ICD-10-CM

## 2025-02-19 PROCEDURE — 99309 SBSQ NF CARE MODERATE MDM 30: CPT | Performed by: INTERNAL MEDICINE

## 2025-02-27 ENCOUNTER — NURSING HOME VISIT (OUTPATIENT)
Dept: GERIATRICS | Facility: OTHER | Age: 75
End: 2025-02-27
Payer: COMMERCIAL

## 2025-02-27 DIAGNOSIS — E11.9 TYPE 2 DIABETES MELLITUS WITHOUT COMPLICATION, WITHOUT LONG-TERM CURRENT USE OF INSULIN (HCC): Chronic | ICD-10-CM

## 2025-02-27 DIAGNOSIS — N17.9 AKI (ACUTE KIDNEY INJURY) (HCC): ICD-10-CM

## 2025-02-27 DIAGNOSIS — E66.813 CLASS 3 SEVERE OBESITY DUE TO EXCESS CALORIES WITH SERIOUS COMORBIDITY AND BODY MASS INDEX (BMI) OF 50.0 TO 59.9 IN ADULT (HCC): ICD-10-CM

## 2025-02-27 DIAGNOSIS — R63.8 DECREASED ORAL INTAKE: Primary | ICD-10-CM

## 2025-02-27 DIAGNOSIS — A08.11 NOROVIRUS: ICD-10-CM

## 2025-02-27 DIAGNOSIS — E66.01 CLASS 3 SEVERE OBESITY DUE TO EXCESS CALORIES WITH SERIOUS COMORBIDITY AND BODY MASS INDEX (BMI) OF 50.0 TO 59.9 IN ADULT (HCC): ICD-10-CM

## 2025-02-27 PROCEDURE — 99309 SBSQ NF CARE MODERATE MDM 30: CPT

## 2025-02-27 NOTE — PROGRESS NOTES
[facility]  [address]  [place] [address]  Facility: [facility]  [facility]  Long Term Care: POS 32    NAME: Stepan Mulligan  AGE: 74 y.o. SEX: male    DATE OF ENCOUNTER: 2/27/25    Code status:  CPR    Assessment and Plan     1. Decreased oral intake  Assessment & Plan:  Encourage oral hydration  Consult dietary service to recommend supplements  [employer] service ordered normal saline via clysis for increased hydration, especially with elevated creatinine  Continue to monitor and document p.o. intake  PRN Zofran for nausea  If p.o. intake continues to decline, may need to decrease diuretic dose  Intermittent laboratory monitoring and assessment for fluid overload  Patient appears euvolemic on exam with no edema  2. Norovirus  Assessment & Plan:  Emesis and loose stools  As needed Zofran ordered by [employer] service  Poor p.o. intake, see decreased oral intake for plan  3. BETO (acute kidney injury) (HCC)  Assessment & Plan:  Elevated creatinine on 2/25/2025: Creatinine 1.88, EGFR 37  Most likely due to decreased p.o. intake, vomiting, and diarrhea  Encourage increased p.o. intake of fluids  Care in collaboration with [employer] service, they ordered normal saline via clysis for increased hydration  Plan to reassess labs  Continue to monitor for acute changes in patient condition  4. Type 2 diabetes mellitus without complication, without long-term current use of insulin ([facility])  Assessment & Plan:  Patient currently taking Ozempic for diabetes management and weight loss  With new onset of nausea vomiting and diarrhea, 1 dose of Ozempic skipped  No abdominal pain on exam  Abdominal x-ray with no acute findings  Labs with no acute findings, plan to restart Ozempic next week  Patient with a poor appetite since taking Ozempic  Encourage staff to monitor oral intake of foods and fluids  5. Class 3 severe obesity due to excess calories with serious comorbidity and  body mass index (BMI) of 50.0 to 59.9 in adult ([facility])  Assessment & Plan:  Patient taking Ozempic weekly  With new onset nausea, vomiting, diarrhea, and decreased oral appetite, abdominal x-ray taken and labs  No acute findings and labs unremarkable  1 dose of Ozempic held and to be restarted next week  No abdominal pain on exam  Continue to monitor for acute changes in patient condition      All medications and routine orders were reviewed and updated as needed.    Plan discussed with: Patient    Chief Complaint     Acute visit    History of Present Illness     Patient with emesis, loose stools, and poor appetite.  Care in collaboration with [employer] service.  Patient also desaturated to the high 80s and was placed on supplemental oxygen.  Abdominal x-ray showed nonobstructive gas patterns and chest x-ray showed no pneumonia.  Labs showed an elevated creatinine, therefore patient was encouraged to drink fluids.  Although with poor p.o. intake, patient received fluids via clysis.  One dose of Ozempic held and as needed Zofran ordered.  On exam today, patient appears fatigued, but states that he feels better.     The following portions of the patient's history were reviewed and updated as appropriate: current medications, past family history, past medical history, past social history, past surgical history and problem list.    Allergies:  No Known Allergies    Review of Systems     Review of Systems   Constitutional:  Positive for activity change and fatigue.        Poor appetite       Medications and orders     All medications reviewed and updated in jail EMR.      Objective     Vitals: /62, temp 99.5 °F, HR 68, RR 19, O2 92%, weight 283.4 pounds    Physical Exam  Vitals and nursing note reviewed.   Constitutional:       General: He is sleeping. He is not in acute distress.     Appearance: Normal appearance. He is well-groomed. He is obese. He is not ill-appearing or diaphoretic.   HENT:      Head:  "Normocephalic and atraumatic.   Cardiovascular:      Rate and Rhythm: Normal rate and regular rhythm.      Heart sounds: Normal heart sounds, S1 normal and S2 normal. No murmur heard.  Pulmonary:      Effort: Pulmonary effort is normal. No respiratory distress.      Breath sounds: Normal breath sounds.   Abdominal:      General: Bowel sounds are normal. There is no distension.      Palpations: Abdomen is soft.      Tenderness: There is no abdominal tenderness.   Musculoskeletal:      Right lower leg: No edema.      Left lower leg: No edema.   Skin:     General: Skin is warm and dry.   Neurological:      Mental Status: He is oriented to person, place, and time and easily aroused. He is lethargic.      Motor: Weakness present.      Gait: Gait abnormal.   Psychiatric:         Attention and Perception: He is inattentive.         Mood and Affect: Affect is flat.         Speech: Speech normal.         Behavior: Behavior normal. Behavior is cooperative.      Comments: Intermittently falling asleep during conversation       Pertinent Laboratory/Diagnostic Studies:     The following labs and studies were reviewed please see chart or hospital paperwork for details.    2/25/25:  Hbg 13.9  Hct 41.6  Wbc 9.7  Plt 143  Bun 32  Crt 1.88  Na 148  K 5.0  Gfr 37  Glucose 112  Amylase 29  Alk Phos 86  Bilirubin, total 1.4  Lipase 29    Portions of the record may have been created with voice recognition software.  Occasional wrong word or \"sound a like\" substitutions may have occurred due to the inherent limitations of voice recognition software.  Read the chart carefully and recognize, using context, where substitutions have occurred.    KSENIA Benton  Geriatric Medicine  2/27/25  "

## 2025-03-10 PROBLEM — N17.9 AKI (ACUTE KIDNEY INJURY) (HCC): Status: ACTIVE | Noted: 2025-03-10

## 2025-03-10 PROBLEM — A08.11 NOROVIRUS: Status: ACTIVE | Noted: 2025-03-10

## 2025-03-10 PROBLEM — R63.8 DECREASED ORAL INTAKE: Status: ACTIVE | Noted: 2025-03-10

## 2025-03-10 NOTE — ASSESSMENT & PLAN NOTE
Patient taking Ozempic weekly  With new onset nausea, vomiting, diarrhea, and decreased oral appetite, abdominal x-ray taken and labs  No acute findings and labs unremarkable  1 dose of Ozempic held and to be restarted next week  No abdominal pain on exam  Continue to monitor for acute changes in patient condition

## 2025-03-10 NOTE — ASSESSMENT & PLAN NOTE
Emesis and loose stools  As needed Zofran ordered by [employer] service  Poor p.o. intake, see decreased oral intake for plan

## 2025-03-10 NOTE — ASSESSMENT & PLAN NOTE
Elevated creatinine on 2/25/2025: Creatinine 1.88, EGFR 37  Increase p.o. intake of fluids  Care in collaboration with [employer] service, they ordered normal saline via clysis for increased hydration  Plan to reassess labs  1 dose of Ozempic held and then to be restarted  Continue to monitor for acute changes in patient condition

## 2025-03-10 NOTE — ASSESSMENT & PLAN NOTE
Elevated creatinine on 2/25/2025: Creatinine 1.88, EGFR 37  Most likely due to decreased p.o. intake, vomiting, and diarrhea  Encourage increased p.o. intake of fluids  Care in collaboration with [employer] service, they ordered normal saline via clysis for increased hydration  Plan to reassess labs  Continue to monitor for acute changes in patient condition

## 2025-03-10 NOTE — ASSESSMENT & PLAN NOTE
Encourage oral hydration  Consult dietary service to recommend supplements  [employer] service ordered normal saline via clysis for increased hydration, especially with elevated creatinine  Continue to monitor and document p.o. intake  PRN Zofran for nausea  If p.o. intake continues to decline, may need to decrease diuretic dose  Intermittent laboratory monitoring and assessment for fluid overload  Patient appears euvolemic on exam with no edema

## 2025-03-19 ENCOUNTER — NURSING HOME VISIT (OUTPATIENT)
Dept: GERIATRICS | Facility: OTHER | Age: 75
End: 2025-03-19
Payer: COMMERCIAL

## 2025-03-19 DIAGNOSIS — E11.59 HYPERTENSION ASSOCIATED WITH DIABETES  (HCC): Primary | Chronic | ICD-10-CM

## 2025-03-19 DIAGNOSIS — E11.9 TYPE 2 DIABETES MELLITUS WITHOUT COMPLICATION, WITHOUT LONG-TERM CURRENT USE OF INSULIN (HCC): Chronic | ICD-10-CM

## 2025-03-19 DIAGNOSIS — R63.8 DECREASED ORAL INTAKE: ICD-10-CM

## 2025-03-19 DIAGNOSIS — I15.2 HYPERTENSION ASSOCIATED WITH DIABETES  (HCC): Primary | Chronic | ICD-10-CM

## 2025-03-19 PROCEDURE — 99309 SBSQ NF CARE MODERATE MDM 30: CPT

## 2025-03-20 NOTE — ASSESSMENT & PLAN NOTE
Current BMI 47 based on reported height of 66 inches and weight of 293.5 pounds  He is doing well with semaglutide and TLC in collaboration with the facility dietary service  Will continue with monitoring for change in his condition

## 2025-03-20 NOTE — ASSESSMENT & PLAN NOTE
Secondary to chronic diuretic use for leg edema  He is doing well with his oral potassium supplement  Will continue with periodic laboratory monitoring for changes condition

## 2025-03-20 NOTE — PROGRESS NOTES
[facility]  [address]  [place] [address]  Facility: [facility]  [facility]  32  Follow-up visit    NAME: Stepan Mulligan  AGE: 74 y.o. SEX: male    DATE OF ENCOUNTER: February 19, 2025.    Code status:  CPR    Assessment and Plan     1. Hemiparesis affecting left side as late effect of cerebrovascular accident ([facility])  Assessment & Plan:  Will continue with secondary stroke prevention of atorvastatin and blood pressure control  As per [facility] neurology service note of December 28, 2022, we will continue with optimization of his BP, diabetes, and lipid care  Will continue with 24/7 care/support of his ADLs at the nursing facility  Will continue with monitoring for changes condition  2. Hypertension associated with diabetes  ([facility])  Assessment & Plan:  He continues to do well with discontinuation and simplification of his medication regimen along with TLC and weight loss  Will continue with monitoring and adjusting of medications, as able  3. Type 2 diabetes mellitus without complication, without long-term current use of insulin ([facility])  Assessment & Plan:  His fingerstick blood sugar log looks well with semaglutide  Will continue with this care plan  Will continue with clinical and periodic laboratory monitoring for changes condition with target hemoglobin A1c less than 8% given his frailty and long-term care residence  4. Frailty syndrome in geriatric patient  Assessment & Plan:  Secondary to his chronic medical conditions  He continues to require 24/7 care/support of his ADLs  I recommend continued care/support of his ADLs as a long-term resident at the nursing facility  Will continue to monitor for change in his condition  5. Class 3 severe obesity due to excess calories with serious comorbidity and body mass index (BMI) of 45.0 to 49.9 in adult ([facility])  Assessment & Plan:  Current BMI 47 based on reported height of 66 inches and weight of 293.5  pounds  He is doing well with semaglutide and TLC in collaboration with the facility dietary service  Will continue with monitoring for change in his condition  6. Drug-induced hypokalemia  Assessment & Plan:  Secondary to chronic diuretic use for leg edema  He is doing well with his oral potassium supplement  Will continue with periodic laboratory monitoring for changes condition  7. Obstructive sleep apnea  Assessment & Plan:  He continues with CPAP usage  8. Full code status      All medications and routine orders were reviewed and updated as needed.    Plan discussed with: Patient and nursing staff.    Chief Complaint     He is seen for a follow-up visit to update the care and treatment of his chronic medical conditions.    History of Present Illness     He is a 74-year-old gentleman who is seen with female nursing staff for a follow-up visit to update the care and treatment of his chronic medical conditions, including left-sided hemiparesis as a result of prior CVA, hypertension, type 2 diabetes mellitus, and frailty secondary to his chronic medical conditions.    When asked, he reports that his appetite has decreased.  However, he is happy with his weight loss.    The following portions of the patient's history were reviewed and updated as appropriate: current medications, past family history, past medical history, past social history, past surgical history and problem list.    Allergies:  No Known Allergies    Review of Systems     Review of Systems   Constitutional:  Positive for appetite change (See HPI).       Medications and orders     All medications reviewed and updated in retirement EMR.      Objective     Vitals: Recent vitals: Weight 293.5 pounds, pulse 55, blood pressure 117/65, fasting fingerstick blood sugar 159, BMI 47 (based on a height of 66 inches)    Physical Exam  Vitals reviewed. Exam conducted with a chaperone present.   Constitutional:       General: He is awake. He is not in acute  "distress.     Appearance: He is well-developed. He is not toxic-appearing or diaphoretic.   Neurological:      Mental Status: He is alert.   Psychiatric:         Mood and Affect: Mood normal.         Behavior: Behavior normal. Behavior is cooperative.       Pertinent Laboratory/Diagnostic Studies:     The following labs were reviewed please see chart or hospital paperwork for details.    February 18, 2025:    BMP: 146, potassium 4.5, BUN 25, creatinine 1.98, fasting blood sugar 108, EGFR 34    - His order summary was reviewed and signed.      Portions of the record may have been created with voice recognition software.  Occasional wrong word or \"sound a like\" substitutions may have occurred due to the inherent limitations of voice recognition software.  Read the chart carefully and recognize, using context, where substitutions have occurred.    Mao Cross M.D.        "

## 2025-03-20 NOTE — ASSESSMENT & PLAN NOTE
He continues to do well with discontinuation and simplification of his medication regimen along with TLC and weight loss  Will continue with monitoring and adjusting of medications, as able

## 2025-03-20 NOTE — ASSESSMENT & PLAN NOTE
His fingerstick blood sugar log looks well with semaglutide  Will continue with this care plan  Will continue with clinical and periodic laboratory monitoring for changes condition with target hemoglobin A1c less than 8% given his frailty and long-term care residence

## 2025-03-30 RX ORDER — ONDANSETRON 4 MG/1
4 TABLET, FILM COATED ORAL EVERY 8 HOURS PRN
COMMUNITY

## 2025-03-30 NOTE — ASSESSMENT & PLAN NOTE
Patient hypotensive this morning with dizziness  Poor p.o. intake  Hypotension due to hypovolemia  Encourage increased hydration  Currently taking lisinopril 40 mg daily and chlorthalidone 25 mg daily  Continue to monitor and trend blood pressures, may be able to discontinue current medications

## 2025-03-30 NOTE — ASSESSMENT & PLAN NOTE
Patient continues to have decreased oral intake  0 to 50% meal completion  Care in collaboration with [employer] service, has received fluids via clysis a few times recently  Patient noted to be hypotensive this morning, BP 84/51  Morning blood pressure medications held, continue with hold parameters  Patient also felt dizzy at the time of hypotension, stated that he felt better once he drank some juice  Fasting blood sugars remain stable around 150  Mucous membranes appear dry, no edema on exam  Repeat blood pressure at time of exam 117/66  Encourage increased hydration to prevent further episodes of hypotension and dizziness  No evidence of infection  Continue to monitor and trend blood pressures

## 2025-03-30 NOTE — PROGRESS NOTES
[facility]  [address]  [place] [address]  Facility: [facility]  Long Term Care: POS 32    NAME: Stepan Mulligan  AGE: 74 y.o. SEX: male    DATE OF ENCOUNTER: 3/19/25    Code status:  CPR    Assessment and Plan     1. Hypertension associated with diabetes  (HCC)  Assessment & Plan:  Patient hypotensive this morning with dizziness  Poor p.o. intake  Hypotension due to hypovolemia  Encourage increased hydration  Currently taking lisinopril 40 mg daily and chlorthalidone 25 mg daily  Continue to monitor and trend blood pressures, may be able to discontinue current medications  2. Decreased oral intake  Assessment & Plan:  Patient continues to have decreased oral intake  0 to 50% meal completion  Care in collaboration with [employer] service, has received fluids via clysis a few times recently  Patient noted to be hypotensive this morning, BP 84/51  Morning blood pressure medications held, continue with hold parameters  Patient also felt dizzy at the time of hypotension, stated that he felt better once he drank some juice  Fasting blood sugars remain stable around 150  Mucous membranes appear dry, no edema on exam  Repeat blood pressure at time of exam 117/66  Encourage increased hydration to prevent further episodes of hypotension and dizziness  No evidence of infection  Continue to monitor and trend blood pressures  3. Type 2 diabetes mellitus without complication, without long-term current use of insulin (HCC)  Assessment & Plan:  Decreased oral intake while taking semaglutide  Fasting blood sugars stable\laboratory monitoring  Continue to monitor for acute changes in patient condition      All medications and routine orders were reviewed and updated as needed.    Plan discussed with: Patient and Nurse    Chief Complaint     Acute visit    History of Present Illness     Patient was hypotensive and dizzy this morning.    The following  portions of the patient's history were reviewed and updated as appropriate: current medications, past family history, past medical history, past social history, past surgical history and problem list.    Allergies:  No Known Allergies    Review of Systems     Review of Systems   Constitutional:  Positive for appetite change and unexpected weight change.        Poor appetite and weight loss   Neurological:  Positive for dizziness.       Medications and orders     All medications reviewed and updated in [facility] EMR.      Objective     Vitals: BP 84/51, temp 97 °F, HR 67, RR 16, O2 97%, weight 280.6 pounds    Physical Exam  Vitals and nursing note reviewed.   Constitutional:       General: He is sleeping. He is not in acute distress.     Appearance: Normal appearance. He is well-groomed. He is obese. He is not ill-appearing or diaphoretic.   HENT:      Head: Normocephalic and atraumatic.   Cardiovascular:      Rate and Rhythm: Normal rate and regular rhythm.      Heart sounds: Normal heart sounds, S1 normal and S2 normal. No murmur heard.  Pulmonary:      Effort: Pulmonary effort is normal. No respiratory distress.      Breath sounds: Normal breath sounds.   Abdominal:      General: Bowel sounds are normal. There is no distension.      Palpations: Abdomen is soft.      Tenderness: There is no abdominal tenderness.   Musculoskeletal:      Right lower leg: No edema.      Left lower leg: No edema.   Skin:     General: Skin is warm and dry.   Neurological:      Mental Status: He is easily aroused. He is lethargic.      Motor: Weakness present.      Gait: Gait abnormal.   Psychiatric:         Mood and Affect: Affect is flat.         Speech: Speech normal.         Behavior: Behavior is withdrawn. Behavior is cooperative.       Pertinent Laboratory/Diagnostic Studies:     The following labs were reviewed please see chart or hospital paperwork for details.    3/10/25:  Bun 20  Crt 1.33  Na 145  K 3.8  Gfr 56  Glucose  "103    Portions of the record may have been created with voice recognition software.  Occasional wrong word or \"sound a like\" substitutions may have occurred due to the inherent limitations of voice recognition software.  Read the chart carefully and recognize, using context, where substitutions have occurred.    KSENIA Benton  Geriatric Medicine  3/19/25  "

## 2025-03-30 NOTE — ASSESSMENT & PLAN NOTE
Decreased oral intake while taking semaglutide  Fasting blood sugars stable\  Intermittent laboratory monitoring  Continue to monitor for acute changes in patient condition

## 2025-04-09 PROBLEM — A08.11 NOROVIRUS: Status: RESOLVED | Noted: 2025-03-10 | Resolved: 2025-04-09

## 2025-04-21 ENCOUNTER — NURSING HOME VISIT (OUTPATIENT)
Dept: GERIATRICS | Facility: OTHER | Age: 75
End: 2025-04-21
Payer: COMMERCIAL

## 2025-04-21 DIAGNOSIS — R52 PAIN AFTER CEREBROVASCULAR ACCIDENT (CVA): ICD-10-CM

## 2025-04-21 DIAGNOSIS — E11.9 TYPE 2 DIABETES MELLITUS WITHOUT COMPLICATION, WITHOUT LONG-TERM CURRENT USE OF INSULIN (HCC): Chronic | ICD-10-CM

## 2025-04-21 DIAGNOSIS — F33.0 MAJOR DEPRESSIVE DISORDER, RECURRENT, MILD (HCC): ICD-10-CM

## 2025-04-21 DIAGNOSIS — E66.813 CLASS 3 SEVERE OBESITY DUE TO EXCESS CALORIES WITH SERIOUS COMORBIDITY AND BODY MASS INDEX (BMI) OF 45.0 TO 49.9 IN ADULT: ICD-10-CM

## 2025-04-21 DIAGNOSIS — E78.5 HYPERLIPIDEMIA, UNSPECIFIED HYPERLIPIDEMIA TYPE: ICD-10-CM

## 2025-04-21 DIAGNOSIS — I69.354 HEMIPARESIS AFFECTING LEFT SIDE AS LATE EFFECT OF CEREBROVASCULAR ACCIDENT (HCC): Chronic | ICD-10-CM

## 2025-04-21 DIAGNOSIS — I15.2 HYPERTENSION ASSOCIATED WITH DIABETES  (HCC): Primary | Chronic | ICD-10-CM

## 2025-04-21 DIAGNOSIS — T50.905A DRUG-INDUCED HYPOKALEMIA: Chronic | ICD-10-CM

## 2025-04-21 DIAGNOSIS — G62.9 NEUROPATHY: ICD-10-CM

## 2025-04-21 DIAGNOSIS — E53.8 VITAMIN B12 DEFICIENCY: ICD-10-CM

## 2025-04-21 DIAGNOSIS — G47.33 OBSTRUCTIVE SLEEP APNEA: ICD-10-CM

## 2025-04-21 DIAGNOSIS — E87.6 DRUG-INDUCED HYPOKALEMIA: Chronic | ICD-10-CM

## 2025-04-21 DIAGNOSIS — I69.398 PAIN AFTER CEREBROVASCULAR ACCIDENT (CVA): ICD-10-CM

## 2025-04-21 DIAGNOSIS — E11.59 HYPERTENSION ASSOCIATED WITH DIABETES  (HCC): Primary | Chronic | ICD-10-CM

## 2025-04-21 PROCEDURE — 99309 SBSQ NF CARE MODERATE MDM 30: CPT

## 2025-04-21 NOTE — PROGRESS NOTES
[facility]  [address]  [place] [address]  Facility: [facility]  [facility]  Long Term Care: POS 32    NAME: Stepan Mulligan  AGE: 74 y.o. SEX: male    DATE OF ENCOUNTER: 4/21/25    Code status:  CPR    Assessment and Plan     1. Hypertension associated with diabetes  (HCC)  Assessment & Plan:  Patient's blood pressures continue to be on the low side, SBP 90s to low 100s  Order to discontinue lisinopril to prevent hypotension  Encourage adequate p.o. hydration  Continue to monitor and trend blood pressures  2. Obstructive sleep apnea  Assessment & Plan:  CPAP at bedtime daily  3. 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)  Assessment & Plan:  Continue Lyrica  Pain controlled  Continue to monitor for acute changes in patient condition  4. Type 2 diabetes mellitus without complication, without long-term current use of insulin (HCC)  Assessment & Plan:  Fasting blood sugars remain stable, continue to monitor  Hemoglobin A1c goal less than 8% due to frailty and comorbidities  Intermittent laboratory monitoring for acute changes in patient condition  5. Neuropathy  Assessment & Plan:  Continue Lyrica twice daily  Pain controlled, continue to monitor  6. Hemiparesis affecting left side as late effect of cerebrovascular accident (HCC)  Assessment & Plan:  Continue atorvastatin for secondary prevention  Continue with 24/7 assistance and support with ADLs at nursing facility  Continue to monitor for acute changes in patient condition  7. Major depressive disorder, recurrent, mild (HCC)  Assessment & Plan:  Care in collaboration with psychiatry service at facility  Continue Lyrica for depression and pain  8. Vitamin B12 deficiency  Assessment & Plan:  Continue vitamin B12 po supplements  Intermittent laboratory monitoring  9. Hyperlipidemia, unspecified hyperlipidemia type  Assessment & Plan:  Continue atorvastatin  Intermittent laboratory  monitoring  10. Drug-induced hypokalemia  Assessment & Plan:  Secondary to chronic diuretic use  Continue p.o. potassium chloride supplements while taking torsemide  Intermittent laboratory monitoring with acute changes in patient condition  11. Class 3 severe obesity due to excess calories with serious comorbidity and body mass index (BMI) of 45.0 to 49.9 in adult  Assessment & Plan:  Continue with healthy diet in collaboration with dietary service at facility  Increase mobility and get out of bed at least daily  Slowly gaining weight since discontinuation of Ozempic on 3/25/2025  Continue to monitor and trend weights      All medications and routine orders were reviewed and updated as needed.    Plan discussed with: Patient and Nurse    Chief Complaint     Follow-up of chronic conditions    History of Present Illness     Patient is being seen today to follow-up on chronic medical conditions in collaboration with nursing staff.  Overall patient is doing well and remains stable.  Patient appears well today laying in bed.  He is more talkative than usual.  Since discontinuing the Ozempic, patient is eating more.  Documentation shows that patient is eating anywhere from 25 to 100% for most meals and is having regular bowel movements.  Patient and staff have no complaints at this time.    The following portions of the patient's history were reviewed and updated as appropriate: current medications, past family history, past medical history, past social history, past surgical history and problem list.    Allergies:  No Known Allergies    Review of Systems     Review of Systems   All other systems reviewed and are negative.      Medications and orders     All medications reviewed and updated in [facility] EMR.      Objective     Vitals: BP 97/55, temp 97.7 °F, HR 78, RR 16, O2 95%, weight 287 pounds    Physical Exam  Vitals and nursing note reviewed.   Constitutional:       General: He is not in acute distress.      "Appearance: Normal appearance. He is well-groomed. He is obese. He is not ill-appearing or diaphoretic.   HENT:      Head: Normocephalic and atraumatic.   Cardiovascular:      Rate and Rhythm: Normal rate and regular rhythm.      Heart sounds: Normal heart sounds, S1 normal and S2 normal. No murmur heard.  Pulmonary:      Effort: Pulmonary effort is normal. No respiratory distress.      Breath sounds: Normal breath sounds.   Abdominal:      General: Bowel sounds are normal. There is no distension.      Palpations: Abdomen is soft.      Tenderness: There is no abdominal tenderness.   Musculoskeletal:      Right lower leg: No edema.      Left lower leg: No edema.   Skin:     General: Skin is warm and dry.   Neurological:      Mental Status: He is alert and easily aroused. Mental status is at baseline.      Motor: Weakness present.      Gait: Gait abnormal.   Psychiatric:         Attention and Perception: Attention and perception normal.         Mood and Affect: Mood and affect normal.         Speech: Speech normal.         Behavior: Behavior normal. Behavior is cooperative.       Pertinent Laboratory/Diagnostic Studies:     The following labs were reviewed please see chart or hospital paperwork for details.    4/18/25:  Bun 20  Crt 1.59  Na 142  K 4.5  Gfr 45  Glucose 102    Portions of the record may have been created with voice recognition software.  Occasional wrong word or \"sound a like\" substitutions may have occurred due to the inherent limitations of voice recognition software.  Read the chart carefully and recognize, using context, where substitutions have occurred.    KSENIA Benton  Geriatric Medicine  4/21/25    "

## 2025-04-23 RX ORDER — ERGOCALCIFEROL 1.25 MG/1
50000 CAPSULE ORAL WEEKLY
COMMUNITY
End: 2025-05-29

## 2025-04-23 RX ORDER — PREGABALIN 75 MG/1
75 CAPSULE ORAL 2 TIMES DAILY
COMMUNITY

## 2025-04-23 NOTE — ASSESSMENT & PLAN NOTE
Continue with healthy diet in collaboration with dietary service at facility  Increase mobility and get out of bed at least daily  Slowly gaining weight since discontinuation of Ozempic on 3/25/2025  Continue to monitor and trend weights

## 2025-04-23 NOTE — ASSESSMENT & PLAN NOTE
Care in collaboration with psychiatry service at facility  Continue Lyrica for depression and pain

## 2025-04-23 NOTE — ASSESSMENT & PLAN NOTE
Continue atorvastatin for secondary prevention  Continue with 24/7 assistance and support with ADLs at nursing facility  Continue to monitor for acute changes in patient condition

## 2025-04-23 NOTE — ASSESSMENT & PLAN NOTE
Fasting blood sugars remain stable, continue to monitor  Hemoglobin A1c goal less than 8% due to frailty and comorbidities  Intermittent laboratory monitoring for acute changes in patient condition

## 2025-04-23 NOTE — ASSESSMENT & PLAN NOTE
Secondary to chronic diuretic use  Continue p.o. potassium chloride supplements while taking torsemide  Intermittent laboratory monitoring with acute changes in patient condition

## 2025-04-23 NOTE — ASSESSMENT & PLAN NOTE
Patient's blood pressures continue to be on the low side, SBP 90s to low 100s  Order to discontinue lisinopril to prevent hypotension  Encourage adequate p.o. hydration  Continue to monitor and trend blood pressures

## 2025-06-16 ENCOUNTER — NURSING HOME VISIT (OUTPATIENT)
Dept: GERIATRICS | Facility: OTHER | Age: 75
End: 2025-06-16
Payer: COMMERCIAL

## 2025-06-16 DIAGNOSIS — E11.59 HYPERTENSION ASSOCIATED WITH DIABETES  (HCC): Chronic | ICD-10-CM

## 2025-06-16 DIAGNOSIS — I89.0 LYMPHEDEMA ASSOCIATED WITH OBESITY: ICD-10-CM

## 2025-06-16 DIAGNOSIS — E66.9 LYMPHEDEMA ASSOCIATED WITH OBESITY: ICD-10-CM

## 2025-06-16 DIAGNOSIS — R63.5 WEIGHT GAIN: Primary | ICD-10-CM

## 2025-06-16 DIAGNOSIS — I15.2 HYPERTENSION ASSOCIATED WITH DIABETES  (HCC): Chronic | ICD-10-CM

## 2025-06-16 PROCEDURE — 99309 SBSQ NF CARE MODERATE MDM 30: CPT

## 2025-06-16 NOTE — ASSESSMENT & PLAN NOTE
6/16/2025: 317 pounds  5/16/2025: 289 pounds  Patient gained 28 pounds in 1 month  Continue to monitor and trend weights  Appears to be caloric intake due to good appetite, meal completion 75 to 100% for most meals   Patient with edema to bilateral lower extremities and slight edema to upper extremities due to dependent gravity  Plan for additional diuretics for 3 days and follow-up labs  Dietary service following at facility  Chronic lymphedema, ace wraps in place to both legs  Continue to monitor for acute changes in patient condition

## 2025-06-16 NOTE — PROGRESS NOTES
[facility]  [address]  [place] [address]  Facility: [facility]  [facility]  Long Term Care: POS 32    NAME: Stepan Mulligan  AGE: 74 y.o. SEX: male    DATE OF ENCOUNTER: 6/16/25    Code status:  CPR    Assessment and Plan     1. Weight gain  Assessment & Plan:  6/16/2025: 317 pounds  5/16/2025: 289 pounds  Patient gained 28 pounds in 1 month  Continue to monitor and trend weights  Appears to be caloric intake due to good appetite, meal completion 75 to 100% for most meals   Patient with edema to bilateral lower extremities and slight edema to upper extremities due to dependent gravity  Plan for additional diuretics for 3 days and follow-up labs  Dietary service following at facility  Chronic lymphedema, ace wraps in place to both legs  Continue to monitor for acute changes in patient condition  2. Lymphedema associated with obesity  Assessment & Plan:  Recent weight gain of 28 pounds  Bilateral lower extremity edema on exam  Slight edema to right upper elbow and left hand due to dependent gravity  Currently taking torsemide 20 mg daily with 20 mill equivalents of potassium chloride  Plan to give an additional 3 days of torsemide 20 mg and potassium chloride 20 mill equivalents  Blood pressures have remained stable, so patient should be able to tolerate extra doses  Continue to monitor and trend weights, will adjust target parameters once stable  Elevate all extremities as tolerated  Continue to monitor for acute changes in patient condition  3. Hypertension associated with diabetes  (HCC)  Assessment & Plan:  Hold parameters in place for torsemide  Plan to give an additional 20 mg of torsemide daily for 3 days  Blood pressure stable, patient should tolerate additional diuretics  Continue to monitor and trend      All medications and routine orders were reviewed and updated as needed.    Plan discussed with: Patient and nurse    Chief  Complaint     Acute visit    History of Present Illness     Patient's gained 28 pounds in the last month.  Patient was recently taken off Ozempic and has an increased appetite.  On exam, patient also appears slightly fluid overloaded.    The following portions of the patient's history were reviewed and updated as appropriate: current medications, past family history, past medical history, past social history, past surgical history and problem list.    Allergies:  Allergies[1]    Review of Systems     Review of Systems   Cardiovascular:  Positive for leg swelling.       Medications and orders     All medications reviewed and updated in intermediate EMR.      Objective     Vitals: /71, temp 97.7 °F, HR 78, RR 16, O2 95%, weight 317 pounds    Physical Exam  Vitals and nursing note reviewed.   Constitutional:       General: He is not in acute distress.     Appearance: Normal appearance. He is well-groomed. He is obese. He is not ill-appearing or diaphoretic.   HENT:      Head: Normocephalic and atraumatic.     Cardiovascular:      Rate and Rhythm: Normal rate and regular rhythm.      Heart sounds: Normal heart sounds, S1 normal and S2 normal. No murmur heard.     Comments: Mostly bilateral ankles and feet  Pulmonary:      Effort: Pulmonary effort is normal. No respiratory distress.      Breath sounds: Normal breath sounds.   Abdominal:      General: Bowel sounds are normal. There is no distension.      Palpations: Abdomen is soft.      Tenderness: There is no abdominal tenderness.     Musculoskeletal:      Right elbow: Swelling present.      Left hand: Swelling present.      Right lower le+ Pitting Edema present.      Left lower le+ Pitting Edema present.      Comments: Dependent gravity     Skin:     General: Skin is warm and dry.      Findings: Petechiae present.      Comments: Petechiae bilateral feet     Neurological:      Mental Status: He is alert and easily aroused. Mental status is at baseline.      " Motor: Weakness present.      Gait: Gait abnormal.     Psychiatric:         Attention and Perception: Attention and perception normal.         Mood and Affect: Mood and affect normal.         Speech: Speech normal.         Behavior: Behavior normal. Behavior is cooperative.         Thought Content: Thought content normal.       Portions of the record may have been created with voice recognition software.  Occasional wrong word or \"sound a like\" substitutions may have occurred due to the inherent limitations of voice recognition software.  Read the chart carefully and recognize, using context, where substitutions have occurred.    KSENIA Benton  Geriatric Medicine  6/16/2025 4:19 PM         [1]  No Known Allergies"

## 2025-06-16 NOTE — ASSESSMENT & PLAN NOTE
Recent weight gain of 28 pounds  Bilateral lower extremity edema on exam  Slight edema to right upper elbow and left hand due to dependent gravity  Currently taking torsemide 20 mg daily with 20 mill equivalents of potassium chloride  Plan to give an additional 3 days of torsemide 20 mg and potassium chloride 20 mill equivalents  Blood pressures have remained stable, so patient should be able to tolerate extra doses  Continue to monitor and trend weights, will adjust target parameters once stable  Elevate all extremities as tolerated  Continue to monitor for acute changes in patient condition

## 2025-06-16 NOTE — ASSESSMENT & PLAN NOTE
Hold parameters in place for torsemide  Plan to give an additional 20 mg of torsemide daily for 3 days  Blood pressure stable, patient should tolerate additional diuretics  Continue to monitor and trend

## 2025-06-19 ENCOUNTER — NURSING HOME VISIT (OUTPATIENT)
Dept: GERIATRICS | Facility: OTHER | Age: 75
End: 2025-06-19
Payer: COMMERCIAL

## 2025-06-19 DIAGNOSIS — G47.33 OBSTRUCTIVE SLEEP APNEA: ICD-10-CM

## 2025-06-19 DIAGNOSIS — E11.59 HYPERTENSION ASSOCIATED WITH DIABETES  (HCC): Chronic | ICD-10-CM

## 2025-06-19 DIAGNOSIS — I89.0 LYMPHEDEMA ASSOCIATED WITH OBESITY: ICD-10-CM

## 2025-06-19 DIAGNOSIS — I69.354 HEMIPARESIS AFFECTING LEFT SIDE AS LATE EFFECT OF CEREBROVASCULAR ACCIDENT (HCC): Primary | Chronic | ICD-10-CM

## 2025-06-19 DIAGNOSIS — E66.9 LYMPHEDEMA ASSOCIATED WITH OBESITY: ICD-10-CM

## 2025-06-19 DIAGNOSIS — E87.6 DRUG-INDUCED HYPOKALEMIA: Chronic | ICD-10-CM

## 2025-06-19 DIAGNOSIS — E11.9 TYPE 2 DIABETES MELLITUS WITHOUT COMPLICATION, WITHOUT LONG-TERM CURRENT USE OF INSULIN (HCC): Chronic | ICD-10-CM

## 2025-06-19 DIAGNOSIS — I15.2 HYPERTENSION ASSOCIATED WITH DIABETES  (HCC): Chronic | ICD-10-CM

## 2025-06-19 DIAGNOSIS — I69.30 HISTORY OF HEMORRHAGIC CEREBROVASCULAR ACCIDENT (CVA) WITH RESIDUAL DEFICIT: Chronic | ICD-10-CM

## 2025-06-19 DIAGNOSIS — T50.905A DRUG-INDUCED HYPOKALEMIA: Chronic | ICD-10-CM

## 2025-06-19 DIAGNOSIS — R54 FRAILTY SYNDROME IN GERIATRIC PATIENT: Chronic | ICD-10-CM

## 2025-06-19 DIAGNOSIS — Z78.9 FULL CODE STATUS: ICD-10-CM

## 2025-06-19 PROCEDURE — 99309 SBSQ NF CARE MODERATE MDM 30: CPT | Performed by: INTERNAL MEDICINE

## 2025-06-20 ENCOUNTER — NURSING HOME VISIT (OUTPATIENT)
Dept: GERIATRICS | Facility: OTHER | Age: 75
End: 2025-06-20
Payer: COMMERCIAL

## 2025-06-20 DIAGNOSIS — R60.0 EDEMA OF RIGHT UPPER ARM: Primary | ICD-10-CM

## 2025-06-20 PROCEDURE — 99308 SBSQ NF CARE LOW MDM 20: CPT | Performed by: INTERNAL MEDICINE

## 2025-06-23 PROBLEM — R60.0 EDEMA OF RIGHT UPPER ARM: Status: ACTIVE | Noted: 2025-06-23

## 2025-06-23 NOTE — PROGRESS NOTES
[facility]  [address]  [place] [address]  Facility: [facility]  [facility]  32  Acute visit    NAME: Stepan Mulligan  AGE: 74 y.o. SEX: male    DATE OF ENCOUNTER: June 20, 2025.    Code status:  CPR    Assessment and Plan     1. Edema of right upper arm  Assessment & Plan:  Likely secondary to positioning with dependence  For completeness, will order a venous Doppler of his right arm to rule out a DVT  Will have nursing staff elevate his right arm above heart level for 1 week  Further recommendations, pending results      All medications and routine orders were reviewed and updated as needed.    Plan discussed with: Patient and nursing staff.    Chief Complaint     Nursing reports swollen right arm.    History of Present Illness     He is a pleasant 74-year-old gentleman who is seen with nursing staff for an acute visit with request of nursing to check his right arm for swelling.  He denies discomfort in his right arm.  He denies weakness or loss of dexterity in his right arm.  Nursing just became aware of the issue, earlier today.    The following portions of the patient's history were reviewed and updated as appropriate: current medications, past family history, past medical history, past social history, past surgical history and problem list.    Allergies:  Allergies[1]    Review of Systems     Review of Systems   Musculoskeletal:         Right arm swelling.       Medications and orders     All medications reviewed and updated in half-way EMR.      Objective     Vitals: None taken for the visit.    Physical Exam  Exam conducted with a chaperone present.   Constitutional:       General: He is awake. He is not in acute distress.     Appearance: He is well-developed. He is not toxic-appearing or diaphoretic.      Comments: He appears comfortable lying in bed, stated age, and frail.     Musculoskeletal:      Comments: Examination of his  "right arm reveals brawny edema affecting the entire arm.  There is no tenderness, erythema, or warmth.     Neurological:      Mental Status: He is alert.     Psychiatric:         Behavior: Behavior is cooperative.       - New orders written and reviewed with nursing staff.      Portions of the record may have been created with voice recognition software.  Occasional wrong word or \"sound a like\" substitutions may have occurred due to the inherent limitations of voice recognition software.  Read the chart carefully and recognize, using context, where substitutions have occurred.    Mao Cross M.D.             [1] No Known Allergies    "

## 2025-06-23 NOTE — ASSESSMENT & PLAN NOTE
Likely secondary to positioning with dependence  For completeness, will order a venous Doppler of his right arm to rule out a DVT  Will have nursing staff elevate his right arm above heart level for 1 week  Further recommendations, pending results

## 2025-06-24 ENCOUNTER — NURSING HOME VISIT (OUTPATIENT)
Dept: GERIATRICS | Facility: OTHER | Age: 75
End: 2025-06-24
Payer: COMMERCIAL

## 2025-06-24 DIAGNOSIS — E66.9 LYMPHEDEMA ASSOCIATED WITH OBESITY: Primary | ICD-10-CM

## 2025-06-24 DIAGNOSIS — R63.5 WEIGHT GAIN: ICD-10-CM

## 2025-06-24 DIAGNOSIS — R60.0 EDEMA OF RIGHT UPPER ARM: ICD-10-CM

## 2025-06-24 DIAGNOSIS — I89.0 LYMPHEDEMA ASSOCIATED WITH OBESITY: Primary | ICD-10-CM

## 2025-06-24 PROCEDURE — 99309 SBSQ NF CARE MODERATE MDM 30: CPT

## 2025-06-24 NOTE — ASSESSMENT & PLAN NOTE
Generalized nonpitting edema to right arm  Continue to elevate, most likely dependent gravity, bedbound  No pain on exam  Venous Doppler negative for DVT  Continue to monitor for acute changes in patient condition

## 2025-06-24 NOTE — ASSESSMENT & PLAN NOTE
Continue to monitor and trend weights  Patient with good appetite and intermittent snacks  Edema to bilateral lower extremities and right upper extremity  Dietary service following at facility  Chronic lymphedema, ace wraps in place to both legs  Echocardiogram to rule out heart failure related to edema and weight gain  Continue to monitor for acute changes in patient condition

## 2025-06-24 NOTE — ASSESSMENT & PLAN NOTE
Bilateral lower extremity edema on exam  New left inner thigh +2 pitting edema  Generalized nonpitting edema to right upper extremity, most likely due to dependent gravity, negative for DVT  Currently taking torsemide 20 mg daily with 20 mill equivalents of potassium chloride  Plan to increase daily torsemide to 40 mg daily with 40 mill equivalents of potassium chloride daily  Hold parameters in place for torsemide if SBP less than 100  Continue to monitor and trend weights  Elevate all extremities as tolerated  With uncontrolled swelling and new edema, will order echocardiogram to rule out heart failure  Order for 1800 mL fluid restriction daily  Continue to monitor for acute changes in patient condition

## 2025-06-24 NOTE — PROGRESS NOTES
[facility]  [address]  [place] [address]  Facility: [facility]  [facility]  Long Term Care: POS 32    NAME: Stepan Mulligan  AGE: 74 y.o. SEX: male    DATE OF ENCOUNTER: 6/24/25    Code status:  CPR    Assessment and Plan     1. Lymphedema associated with obesity  Assessment & Plan:  Bilateral lower extremity edema on exam  New left inner thigh +2 pitting edema  Generalized nonpitting edema to right upper extremity, most likely due to dependent gravity, negative for DVT  Currently taking torsemide 20 mg daily with 20 mill equivalents of potassium chloride  Plan to increase daily torsemide to 40 mg daily with 40 mill equivalents of potassium chloride daily  Hold parameters in place for torsemide if SBP less than 100  Continue to monitor and trend weights  Elevate all extremities as tolerated  With uncontrolled swelling and new edema, will order echocardiogram to rule out heart failure  Order for 1800 mL fluid restriction daily  Continue to monitor for acute changes in patient condition    2. Weight gain  Assessment & Plan:  Continue to monitor and trend weights  Patient with good appetite and intermittent snacks  Edema to bilateral lower extremities and right upper extremity  Dietary service following at facility  Chronic lymphedema, ace wraps in place to both legs  Echocardiogram to rule out heart failure related to edema and weight gain  Continue to monitor for acute changes in patient condition  3. Edema of right upper arm  Assessment & Plan:  Generalized nonpitting edema to right arm  Continue to elevate, most likely dependent gravity, bedbound  No pain on exam  Venous Doppler negative for DVT  Continue to monitor for acute changes in patient condition      All medications and routine orders were reviewed and updated as needed.    Plan discussed with: Patient and nurse    Chief Complaint     Acute visit    History of Present Illness      Staff reports that patient has increased edema to left leg and right upper extremity.    The following portions of the patient's history were reviewed and updated as appropriate: current medications, past family history, past medical history, past social history, past surgical history and problem list.    Allergies:  Allergies[1]    Review of Systems     Review of Systems   All other systems reviewed and are negative.      Medications and orders     All medications reviewed and updated in alf EMR.      Objective     Vitals: /65, temp 97.7 °F, HR 78, RR 16, O2 95%, weight 318 pounds    Physical Exam  Vitals and nursing note reviewed.   Constitutional:       General: He is awake. He is not in acute distress.     Appearance: Normal appearance. He is well-groomed. He is obese. He is not ill-appearing or diaphoretic.   HENT:      Head: Normocephalic and atraumatic.     Cardiovascular:      Comments: Left upper inner thigh pitting edema  Right arm generalized nonpitting edema  Pulmonary:      Effort: Pulmonary effort is normal. No respiratory distress.     Musculoskeletal:      Right upper arm: Edema present.      Right elbow: Swelling present.      Right forearm: Edema present.      Left hand: Swelling present.      Left upper leg: Edema present.      Right lower le+ Pitting Edema present.      Left lower le+ Pitting Edema present.      Comments: Dependent gravity     Skin:     General: Skin is warm and dry.     Neurological:      Mental Status: He is alert. Mental status is at baseline.      Motor: Weakness present.      Gait: Gait abnormal.     Psychiatric:         Attention and Perception: Attention and perception normal.         Mood and Affect: Mood and affect normal.         Speech: Speech normal.         Behavior: Behavior normal. Behavior is cooperative.         Thought Content: Thought content normal.       Pertinent Laboratory/Diagnostic Studies:     The following labs were reviewed  "please see chart or hospital paperwork for details.    6/23/2025:  Glucose 159 creatinine 1.39 sodium 148 potassium 3.5 EGFR 53    Portions of the record may have been created with voice recognition software.  Occasional wrong word or \"sound a like\" substitutions may have occurred due to the inherent limitations of voice recognition software.  Read the chart carefully and recognize, using context, where substitutions have occurred.    KSENIA Benton  Geriatric Medicine  6/24/2025 5:05 PM       [1] No Known Allergies    "

## 2025-07-02 ENCOUNTER — NURSING HOME VISIT (OUTPATIENT)
Dept: GERIATRICS | Facility: OTHER | Age: 75
End: 2025-07-02
Payer: COMMERCIAL

## 2025-07-02 DIAGNOSIS — E66.9 LYMPHEDEMA ASSOCIATED WITH OBESITY: Primary | ICD-10-CM

## 2025-07-02 DIAGNOSIS — R60.0 EDEMA OF RIGHT UPPER ARM: ICD-10-CM

## 2025-07-02 DIAGNOSIS — I89.0 LYMPHEDEMA ASSOCIATED WITH OBESITY: Primary | ICD-10-CM

## 2025-07-02 PROCEDURE — 99309 SBSQ NF CARE MODERATE MDM 30: CPT

## 2025-07-03 NOTE — PROGRESS NOTES
[facility]  [address]  [place] [address]  Facility: [facility]  [facility]  Long Term Care: POS 32    NAME: Stepan Mulligan  AGE: 74 y.o. SEX: male    DATE OF ENCOUNTER: 7/2/25    Code status:  CPR    Assessment and Plan     1. Lymphedema associated with obesity  Assessment & Plan:  Dose of torsemide recently increased to 40 g daily  Patient appears euvolemic with only trace edema to left foot from dependent gravity  Continue to monitor and trend weights  Elevate lower extremities to prevent edema  Continue Ace wraps to lower extremities  Continue 1800 mL fluid restriction  Labs appear stable after dose adjustment of torsemide  Echocardiogram completed to rule out heart failure, see results below:    6/30/2025 Echocardiogram:  Conclusion:   Normal LV size systolic and diastolic function with LVEF of 55 to 60%  Normal RV size and function  Trace mitral regurgitation  No pericardial effusion  2. Edema of right upper arm  Assessment & Plan:  Right upper extremity no longer with edema  Recent venous Doppler negative for DVT  Improvement since increased dose of torsemide  Continue to monitor for acute changes in patient condition      All medications and routine orders were reviewed and updated as needed.    Plan discussed with: Patient and Nurse    Chief Complaint     Follow-up of chronic conditions    History of Present Illness     Patient was being seen today to follow-up on status post echocardiogram and increase in diuretic dose.  On exam, edema has improved and patient feels good with no complaints.    The following portions of the patient's history were reviewed and updated as appropriate: current medications, past family history, past medical history, past social history, past surgical history and problem list.    Allergies:  Allergies[1]    Review of Systems     Review of Systems   All other systems reviewed and are  negative.      Medications and orders     All medications reviewed and updated in alf EMR.      Objective     Vitals: /70, temp 97.7 °F, HR 78, RR 16, O2 95%, weight 323 pounds    Physical Exam  Vitals and nursing note reviewed.   Constitutional:       General: He is not in acute distress.     Appearance: Normal appearance. He is well-groomed. He is obese. He is not ill-appearing or diaphoretic.   HENT:      Head: Normocephalic and atraumatic.     Cardiovascular:      Rate and Rhythm: Normal rate and regular rhythm.      Heart sounds: Normal heart sounds, S1 normal and S2 normal. No murmur heard.     Comments: Trace edema left foot, dependent gravity, encourage elevation  Pulmonary:      Effort: Pulmonary effort is normal. No respiratory distress.      Breath sounds: Normal breath sounds.   Abdominal:      General: Bowel sounds are normal. There is no distension.      Palpations: Abdomen is soft.      Tenderness: There is no abdominal tenderness.     Musculoskeletal:      Right lower leg: No edema.      Left lower leg: Edema present.     Skin:     General: Skin is warm and dry.     Neurological:      Mental Status: He is alert and easily aroused. Mental status is at baseline.      Motor: Weakness present.      Gait: Gait abnormal.     Psychiatric:         Attention and Perception: Attention and perception normal.         Mood and Affect: Mood and affect normal.         Speech: Speech normal.         Behavior: Behavior normal. Behavior is cooperative.         Thought Content: Thought content normal.       Pertinent Laboratory/Diagnostic Studies:     The following labs and studies were reviewed please see chart or hospital paperwork for details.    6/30/2025:  Glucose 111 BUN 15 creatinine 1.32 sodium 145 potassium 4.3 EGFR 56    6/30/2025 Echocardiogram:  Conclusion:   Normal LV size systolic and diastolic function with LVEF of 55 to 60%  Normal RV size and function  Trace mitral regurgitation  No  "pericardial effusion    Portions of the record may have been created with voice recognition software.  Occasional wrong word or \"sound a like\" substitutions may have occurred due to the inherent limitations of voice recognition software.  Read the chart carefully and recognize, using context, where substitutions have occurred.    KSENIA Benton  Geriatric Medicine  7/2/25         [1]  No Known Allergies"

## 2025-07-09 NOTE — ASSESSMENT & PLAN NOTE
Dose of torsemide recently increased to 40 g daily  Patient appears euvolemic with only trace edema to left foot from dependent gravity  Continue to monitor and trend weights  Elevate lower extremities to prevent edema  Continue Ace wraps to lower extremities  Continue 1800 mL fluid restriction  Labs appear stable after dose adjustment of torsemide  Echocardiogram completed to rule out heart failure, see results below:    6/30/2025 Echocardiogram:  Conclusion:   Normal LV size systolic and diastolic function with LVEF of 55 to 60%  Normal RV size and function  Trace mitral regurgitation  No pericardial effusion

## 2025-07-09 NOTE — ASSESSMENT & PLAN NOTE
Right upper extremity no longer with edema  Recent venous Doppler negative for DVT  Improvement since increased dose of torsemide  Continue to monitor for acute changes in patient condition

## 2025-07-20 PROBLEM — N17.9 AKI (ACUTE KIDNEY INJURY) (HCC): Status: RESOLVED | Noted: 2025-03-10 | Resolved: 2025-07-20

## 2025-07-20 NOTE — ASSESSMENT & PLAN NOTE
Will continue with a multimodal clear plan, including diuretic therapy, compression, and elevation

## 2025-07-20 NOTE — ASSESSMENT & PLAN NOTE
Review of his fasting fingerstick blood sugar log looks well without medication  Will continue with clinical and periodic laboratory monitoring for change in his condition with target hemoglobin A1c <8% given his frailty and long-term care residence

## 2025-07-20 NOTE — ASSESSMENT & PLAN NOTE
Will continue with optimization of his blood pressure  Will continue with monitoring for change in his condition

## 2025-07-20 NOTE — PROGRESS NOTES
[facility]  [address]  [place] [address]  Facility: [facility]  [facility]  Follow-up visit    NAME: Stepan Mulligan  AGE: 74 y.o. SEX: male    DATE OF ENCOUNTER: June 19, 2025.    Code status:  CPR    Assessment and Plan     1. Hemiparesis affecting left side as late effect of cerebrovascular accident (HCC)  Assessment & Plan:  Will continue with secondary stroke prevention, specifically atorvastatin with optimization of his blood pressure and diabetes  Will continue with 24/7 care/support of his ADLs at the nursing facility  Last seen by his neurology service on December 28, 2022 with recommendation to follow-up, as needed  Will continue with monitoring for change in his condition  2. Type 2 diabetes mellitus without complication, without long-term current use of insulin (HCC)  Assessment & Plan:  Review of his fasting fingerstick blood sugar log looks well without medication  Will continue with clinical and periodic laboratory monitoring for change in his condition with target hemoglobin A1c <8% given his frailty and long-term care residence  3. Drug-induced hypokalemia  Assessment & Plan:  Secondary to chronic diuretic use for his lymphedema  He continues on an oral potassium supplement  Will continue with periodic laboratory monitoring for change in his condition  4. Frailty syndrome in geriatric patient  Assessment & Plan:  Secondary to his chronic medical conditions  He continues to require 24/7 care/support of his ADLs  I recommend continued care/support of his ADLs as a long-term resident at the nursing facility  Will continue to monitor for change in his condition  5. Lymphedema associated with obesity  Assessment & Plan:  Will continue with a multimodal clear plan, including diuretic therapy, compression, and elevation  6. History of hemorrhagic cerebrovascular accident (CVA) with residual deficit  Assessment & Plan:  Will  continue with optimization of his blood pressure  Will continue with monitoring for change in his condition  7. Hypertension associated with diabetes  (HCC)  Assessment & Plan:  He has been doing well without medication  Will continue with monitoring for change in his condition  8. Obstructive sleep apnea  Assessment & Plan:  He continues with routine use of his CPAP machine  9. Full code status    See my note of February 19, 2025 for further information.    All medications and routine orders were reviewed and updated as needed.    Plan discussed with: Patient and nursing staff.    Chief Complaint     He is seen for a follow-up visit to update the care and treatment of his chronic medical conditions.    History of Present Illness     He is a pleasant 74-year-old gentleman who is seen for a follow-up visit to update the care and treatment of his chronic medical conditions, including left-sided hemiparesis from prior CVA, type 2 diabetes mellitus, and frailty secondary to his chronic medical conditions.    When asked, he has no complaints.  He reports that his appetite is improved off of semaglutide.    The following portions of the patient's history were reviewed and updated as appropriate: current medications, past family history, past medical history, past social history, past surgical history and problem list.    Allergies:  Allergies[1]    Review of Systems     Review of Systems   Constitutional:  Positive for appetite change (Improved off of semaglutide).        Offers no complaints       Medications and orders     All medications reviewed and updated in halfway EMR.      Objective     Vitals: Recent vitals: Weight 318 pounds, blood pressure 114/63, fasting fingerstick blood sugar 143.    Physical Exam  Vitals reviewed.   Constitutional:       General: He is awake. He is not in acute distress.     Appearance: He is well-developed. He is not toxic-appearing or diaphoretic.      Comments: He appears  "comfortable lying in bed, stated age, and frail.     Cardiovascular:      Rate and Rhythm: Normal rate and regular rhythm.      Heart sounds: Normal heart sounds. No murmur heard.     No friction rub. No gallop.   Pulmonary:      Effort: No respiratory distress.      Breath sounds: Normal breath sounds. No stridor. No wheezing, rhonchi or rales.     Musculoskeletal:      Comments: Ace wraps on right and left leg.  Lymphedema of both right and left leg.     Skin:     Findings: Rash (Facial rash consistent with acne rosacea.) present.     Neurological:      Mental Status: He is alert.     Psychiatric:         Mood and Affect: Mood normal.         Behavior: Behavior normal. Behavior is cooperative.       - His order summary was reviewed and signed.      Portions of the record may have been created with voice recognition software.  Occasional wrong word or \"sound a like\" substitutions may have occurred due to the inherent limitations of voice recognition software.  Read the chart carefully and recognize, using context, where substitutions have occurred.    Mao Cross M.D.             [1] No Known Allergies    "

## 2025-07-20 NOTE — ASSESSMENT & PLAN NOTE
Will continue with secondary stroke prevention, specifically atorvastatin with optimization of his blood pressure and diabetes  Will continue with 24/7 care/support of his ADLs at the nursing facility  Last seen by his neurology service on December 28, 2022 with recommendation to follow-up, as needed  Will continue with monitoring for change in his condition

## 2025-07-20 NOTE — ASSESSMENT & PLAN NOTE
He has been doing well without medication  Will continue with monitoring for change in his condition

## 2025-07-20 NOTE — ASSESSMENT & PLAN NOTE
Secondary to chronic diuretic use for his lymphedema  He continues on an oral potassium supplement  Will continue with periodic laboratory monitoring for change in his condition